# Patient Record
Sex: FEMALE | Race: WHITE | Employment: OTHER | URBAN - METROPOLITAN AREA
[De-identification: names, ages, dates, MRNs, and addresses within clinical notes are randomized per-mention and may not be internally consistent; named-entity substitution may affect disease eponyms.]

---

## 2017-01-23 ENCOUNTER — TELEPHONE (OUTPATIENT)
Dept: FAMILY MEDICINE | Facility: CLINIC | Age: 82
End: 2017-01-23

## 2017-01-23 NOTE — TELEPHONE ENCOUNTER
The travel clinic will make recommendations and administer the appropriate immunizations/meds.    Tameka Romo PA-C

## 2017-01-23 NOTE — TELEPHONE ENCOUNTER
Called pt and advised of provider notes below. [facility] travel clinic phone # given.    Nelson Palacio RN

## 2017-01-23 NOTE — TELEPHONE ENCOUNTER
Patient will be going to [place] on March 8th and will be staying there for 5 days.  Patient checked the Internet and was informed that she should receive vaccination for Hepatitis A and Typhoid.  Patient spoke to travel clinic, but didn't schedule an appointment due to not covered by insurance.    Routing to provider. Please advise.  Sharon Doty RN

## 2017-01-23 NOTE — TELEPHONE ENCOUNTER
Pt is leaving the country to   [place]  They recommend her to get some shots  She has questions for you on what to get and where to get them    She looked online and is confused by it all  Call to advise and ok to leave message    Diann John (Self) [phone] (H)

## 2017-01-25 ENCOUNTER — TELEPHONE (OUTPATIENT)
Dept: FAMILY MEDICINE | Facility: CLINIC | Age: 82
End: 2017-01-25

## 2017-01-25 NOTE — TELEPHONE ENCOUNTER
Reason for Call:  Other call back    Detailed comments: Pt would like to talk further about a Hepataitis A shot for she has  questions she would like to address.    Phone Number Patient can be reached at: Home number on file [phone] (home)    Best Time: Anytime    Can we leave a detailed message on this number? YES    Call taken on 1/25/2017 at 1:59 PM by Barrera Contreras

## 2017-02-16 ENCOUNTER — OFFICE VISIT (OUTPATIENT)
Dept: URGENT CARE | Facility: URGENT CARE | Age: 82
End: 2017-02-16
Payer: COMMERCIAL

## 2017-02-16 ENCOUNTER — TELEPHONE (OUTPATIENT)
Dept: FAMILY MEDICINE | Facility: CLINIC | Age: 82
End: 2017-02-16

## 2017-02-16 VITALS
TEMPERATURE: 98.4 F | SYSTOLIC BLOOD PRESSURE: 144 MMHG | WEIGHT: 115 LBS | DIASTOLIC BLOOD PRESSURE: 72 MMHG | OXYGEN SATURATION: 96 % | BODY MASS INDEX: 21.38 KG/M2 | HEART RATE: 58 BPM

## 2017-02-16 DIAGNOSIS — N30.90 BLADDER INFECTION: ICD-10-CM

## 2017-02-16 DIAGNOSIS — R30.0 DYSURIA: Primary | ICD-10-CM

## 2017-02-16 DIAGNOSIS — R82.90 NONSPECIFIC FINDING ON EXAMINATION OF URINE: ICD-10-CM

## 2017-02-16 LAB
ALBUMIN UR-MCNC: ABNORMAL MG/DL
APPEARANCE UR: ABNORMAL
BACTERIA #/AREA URNS HPF: ABNORMAL /HPF
BILIRUB UR QL STRIP: NEGATIVE
COLOR UR AUTO: YELLOW
GLUCOSE UR STRIP-MCNC: NEGATIVE MG/DL
HGB UR QL STRIP: ABNORMAL
KETONES UR STRIP-MCNC: NEGATIVE MG/DL
LEUKOCYTE ESTERASE UR QL STRIP: ABNORMAL
NITRATE UR QL: NEGATIVE
PH UR STRIP: 5.5 PH (ref 5–7)
RBC #/AREA URNS AUTO: >100 /HPF (ref 0–2)
SP GR UR STRIP: 1.01 (ref 1–1.03)
URN SPEC COLLECT METH UR: ABNORMAL
UROBILINOGEN UR STRIP-ACNC: 0.2 EU/DL (ref 0.2–1)
WBC #/AREA URNS AUTO: ABNORMAL /HPF (ref 0–2)

## 2017-02-16 PROCEDURE — 81001 URINALYSIS AUTO W/SCOPE: CPT | Performed by: FAMILY MEDICINE

## 2017-02-16 PROCEDURE — 99213 OFFICE O/P EST LOW 20 MIN: CPT | Performed by: FAMILY MEDICINE

## 2017-02-16 RX ORDER — HEPATITIS A VACCINE, INACTIVATED 50 [IU]/ML
50 INJECTION, SUSPENSION INTRAMUSCULAR ONCE
COMMUNITY
Start: 2017-01-25 | End: 2019-07-31

## 2017-02-16 RX ORDER — CIPROFLOXACIN 250 MG/1
250 TABLET, FILM COATED ORAL 2 TIMES DAILY
Qty: 6 TABLET | Refills: 0 | Status: SHIPPED | OUTPATIENT
Start: 2017-02-16 | End: 2017-02-19

## 2017-02-16 NOTE — PROGRESS NOTES
Some of this note was populated by a medical assistant.      SUBJECTIVE:                                                    Diann John is a 85 year old female who presents to clinic today for the following health issues:      URINARY TRACT SYMPTOMS      Duration: 3 days     Description  Frequency, hesitancy, denies dysuria     Intensity:  moderate    Accompanying signs and symptoms:  Fever/chills: YES- chills. No fever.   Flank pain no   Nausea and vomiting: no   Vaginal symptoms: none  Abdominal/Pelvic Pain: YES- pressure in bladder area    History  History of frequent UTI's: no   History of kidney stones: no   Sexually Active: no   Possibility of pregnancy: No    Precipitating or alleviating factors: Pt states that ever since she started her new BP medication that her urine output has changed.     Therapies tried and outcome: increase fluid intake   Outcome: no relief.      Rare UTI hx over her lifetime.     Problem list and histories reviewed & adjusted, as indicated.  Additional history: as documented    Patient Active Problem List   Diagnosis     Macular pucker, right eye     Pseudophakia OU     Blepharitis     Hypertension goal BP (blood pressure) < 140/90     CARDIOVASCULAR SCREENING; LDL GOAL LESS THAN 70     Seasonal allergies     Acne     Steroid responders     Carotid artery stenosis, asymptomatic     PVD (posterior vitreous detachment) OU     Advanced directives, counseling/discussion     Past Surgical History   Procedure Laterality Date     Appendectomy       C rad resec tonsil/pillars       Adeniods     Hand surgery       Nose surgery       Cataract iol, rt/lt Right        Social History   Substance Use Topics     Smoking status: Former Smoker     Quit date: 1/1/1970     Smokeless tobacco: Never Used     Alcohol use No     Family History   Problem Relation Age of Onset     CANCER Father      Glaucoma Father      CANCER Mother      Hypertension Mother      Hypertension Sister      CEREBROVASCULAR  DISEASE Sister      Thyroid Disease No family hx of      Macular Degeneration No family hx of          Current Outpatient Prescriptions   Medication Sig Dispense Refill     fluorometholone (FML LIQUIFILM) 0.1 % ophthalmic suspension Place 1 drop into both eyes 3 times daily 1 Bottle 3     ascorbic acid (VITAMIN C) 1000 MG TABS Take 1,000 mg by mouth daily       amLODIPine (NORVASC) 5 MG tablet Take 1 tablet (5 mg) by mouth daily 90 tablet 1     atorvastatin (LIPITOR) 20 MG tablet Take 1 tablet (20 mg) by mouth daily 90 tablet 1     fluticasone (FLONASE) 50 MCG/ACT nasal spray Spray 1-2 sprays into both nostrils daily 16 g 11     erythromycin (ROMYCIN) ophthalmic ointment Place 1 Application into both eyes At Bedtime Apply small (1/4 inch) strip to affected eye(s) 1 Tube 6     loteprednol (ALREX) 0.2 % SUSP Place 1 drop into both eyes 2 times daily as needed 1 Bottle 1     aspirin 81 MG tablet Take 1 tablet (81 mg) by mouth daily 30 tablet      ipratropium (ATROVENT) 0.06 % nasal spray Spray 2 sprays into both nostrils 4 times daily as needed for rhinitis 1 Box 11     omega-3 fatty acids (FISH OIL) 1200 MG capsule Take 1 capsule by mouth daily       Multiple Vitamin (MULTI-VITAMIN PO) Take 1 tablet by mouth daily.       Calcium-Vitamin D (CALCIUM + D PO) Take 1 tablet by mouth daily.       VAQTA 50 UNIT/ML injection        Allergies   Allergen Reactions     Codeine Other (See Comments)     Heaaches     Lisinopril Cough       ROS:  Constitutional, HEENT, cardiovascular, pulmonary, gi and gu systems are negative, except as otherwise noted.    OBJECTIVE:                                                    /72 (BP Location: Left arm, Patient Position: Chair, Cuff Size: Adult Regular)  Pulse 58  Temp 98.4  F (36.9  C) (Oral)  Wt 115 lb (52.2 kg)  SpO2 96%  Breastfeeding? No  BMI 21.38 kg/m2  Body mass index is 21.38 kg/(m^2).  GENERAL: healthy, alert and no distress  NECK: no adenopathy, no asymmetry, masses, or  scars and thyroid normal to palpation  RESP: lungs clear to auscultation - no rales, rhonchi or wheezes  CV: regular rate and rhythm, normal S1 S2, no S3 or S4, no murmur, click or rub, no peripheral edema and peripheral pulses strong  ABDOMEN: soft, nontender, no hepatosplenomegaly, no masses and bowel sounds normal  MS: no gross musculoskeletal defects noted, no edema    Results for orders placed or performed in visit on 02/16/17   UA with Microscopic reflex to Culture   Result Value Ref Range    Color Urine Yellow     Appearance Urine Slightly Cloudy     Glucose Urine Negative NEG mg/dL    Bilirubin Urine Negative NEG    Ketones Urine Negative NEG mg/dL    Specific Gravity Urine 1.015 1.003 - 1.035    pH Urine 5.5 5.0 - 7.0 pH    Protein Albumin Urine Trace (A) NEG mg/dL    Urobilinogen Urine 0.2 0.2 - 1.0 EU/dL    Nitrite Urine Negative NEG    Blood Urine Large (A) NEG    Leukocyte Esterase Urine Moderate (A) NEG    Source Midstream Urine     WBC Urine >100  Clumps of WBC's seen   (A) 0 - 2 /HPF    RBC Urine >100 (A) 0 - 2 /HPF    Bacteria Urine Moderate (A) NEG /HPF         ASSESSMENT/PLAN:                                                        ICD-10-CM    1. Dysuria R30.0 UA with Microscopic reflex to Culture   2. Nonspecific finding on examination of urine R82.90 Urine Culture Aerobic Bacterial   3. Bladder infection N30.90 ciprofloxacin (CIPRO) 250 MG tablet      PLAN  Patient educational/instructional material provided including reasons for follow-up   The patient indicates understanding of these issues and agrees with the plan.  Víctor Butler MD

## 2017-02-16 NOTE — TELEPHONE ENCOUNTER
Patient has had for past few days a pressure or fullness in lower abdomen. When she urinates it feels better for a little while but she is not sure if she is completely emptying. Patient denies n/v, fever, back / abd pain, burning, or enlarging abdomen. However, patient would like appointment as she feels this is progressing. I offered patient urgent care. Patient is agreeable to coming in to be seen at [facility], today.       Layne Esquivel RN

## 2017-02-16 NOTE — MR AVS SNAPSHOT
After Visit Summary   2/16/2017    Diann John    MRN: [id number]           Patient Information     Date Of Birth          7/31/1931        Visit Information        Provider Department      2/16/2017 4:40 PM Víctor Butler MD [facility]        Today's Diagnoses     Dysuria    -  1    Nonspecific finding on examination of urine        Bladder infection          Care Instructions      Understanding Urinary Tract Infections (UTIs)  Most UTIs are caused by bacteria, although they may also be caused by viruses or fungi. Bacteria from the bowel are the most common source of infection. The infection may begin because of any of the following:    Sexual activity. During sex, germs can travel from the penis, vagina, or rectum into the urethra.     Germs on the skin outside the rectum may travel into the urethra. This is more common in women since the rectum and urethra are closer to each other than in men. Wiping from front to back after using the toilet and keeping the area clean can help prevent germs from getting to the urethra.    Blockage of urine flow through the urinary tract. If urine sits too long, germs may begin to grow out of control.      Parts of the urinary tract  The infection can occur in any part of the urinary tract.    The kidneys collect and store urine.    The ureters carry urine from the kidneys to the bladder.    The bladder holds urine until you are ready to let it out.    The urethra carries urine from the bladder out of the body. It is shorter in women, so bacteria can move through it more easily. The urethra is longer in men, so a UTI is less likely to reach the bladder or kidneys in men.    7156-9361 [employer]. [address] [place] [address], [place] [address]. All rights reserved. This information is not intended as a substitute for professional medical care. Always follow your healthcare professional's instructions.              Follow-ups  "after your visit        Who to contact     If you have questions or need follow up information about today's clinic visit or your schedule please contact [facility] directly at [phone].  Normal or non-critical lab and imaging results will be communicated to you by MyChart, letter or phone within 4 business days after the clinic has received the results. If you do not hear from us within 7 days, please contact the clinic through MyChart or phone. If you have a critical or abnormal lab result, we will notify you by phone as soon as possible.  Submit refill requests through [employer] or call your pharmacy and they will forward the refill request to us. Please allow 3 business days for your refill to be completed.          Additional Information About Your Visit        [facility] Information     [employer] lets you send messages to your doctor, view your test results, renew your prescriptions, schedule appointments and more. To sign up, go to [email or web address] . Click on \"Log in\" on the left side of the screen, which will take you to the Welcome page. Then click on \"Sign up Now\" on the right side of the page.     You will be asked to enter the access code listed below, as well as some personal information. Please follow the directions to create your username and password.     Your access code is: [email or web address]  Expires: 2017  6:12 PM     Your access code will  in 90 days. If you need help or a new code, please call your [place] clinic or [phone].        Care EveryWhere ID     This is your Care EveryWhere ID. This could be used by other organizations to access your [place] medical records  [id number]        Your Vitals Were     Pulse Temperature Pulse Oximetry Breastfeeding? BMI (Body Mass Index)       58 98.4  F (36.9  C) (Oral) 96% No 21.38 kg/m2        Blood Pressure from Last 3 Encounters:   17 144/72   16 115/72   16 126/60    Weight from Last 3 Encounters: "   02/16/17 115 lb (52.2 kg)   09/26/16 113 lb (51.3 kg)   06/06/16 115 lb 4.8 oz (52.3 kg)              We Performed the Following     UA with Microscopic reflex to Culture     Urine Culture Aerobic Bacterial          Today's Medication Changes          These changes are accurate as of: 2/16/17  6:12 PM.  If you have any questions, ask your nurse or doctor.               Start taking these medicines.        Dose/Directions    ciprofloxacin 250 MG tablet   Commonly known as:  CIPRO   Used for:  Bladder infection   Started by:  Víctor Butler MD        Dose:  250 mg   Take 1 tablet (250 mg) by mouth 2 times daily for 3 days   Quantity:  6 tablet   Refills:  0            Where to get your medicines      These medications were sent to [facility] - [place], [place] - [address]  [address] [address]     Phone:  [phone]     ciprofloxacin 250 MG tablet                Primary Care Provider Office Phone # Fax #    Tameka Romo PA-C [phone]       [facility] [address]  [facility] [address]        Thank you!     Thank you for choosing [facility]  for your care. Our goal is always to provide you with excellent care. Hearing back from our patients is one way we can continue to improve our services. Please take a few minutes to complete the written survey that you may receive in the mail after your visit with us. Thank you!             Your Updated Medication List - Protect others around you: Learn how to safely use, store and throw away your medicines at www.disposemymeds.org.          This list is accurate as of: 2/16/17  6:12 PM.  Always use your most recent med list.                   Brand Name Dispense Instructions for use    amLODIPine 5 MG tablet    NORVASC    90 tablet    Take 1 tablet (5 mg) by mouth daily       ascorbic acid 1000 MG Tabs    vitamin C     Take 1,000 mg by mouth daily        aspirin 81 MG tablet     30 tablet    Take 1 tablet (81 mg) by mouth daily       atorvastatin 20 MG tablet    LIPITOR    90 tablet    Take 1 tablet (20 mg) by mouth daily       CALCIUM + D PO      Take 1 tablet by mouth daily.       ciprofloxacin 250 MG tablet    CIPRO    6 tablet    Take 1 tablet (250 mg) by mouth 2 times daily for 3 days       erythromycin ophthalmic ointment    ROMYCIN    1 Tube    Place 1 Application into both eyes At Bedtime Apply small (1/4 inch) strip to affected eye(s)       fluorometholone 0.1 % ophthalmic susp    FML LIQUIFILM    1 Bottle    Place 1 drop into both eyes 3 times daily       fluticasone 50 MCG/ACT spray    FLONASE    16 g    Spray 1-2 sprays into both nostrils daily       ipratropium 0.06 % spray    ATROVENT    1 Box    Spray 2 sprays into both nostrils 4 times daily as needed for rhinitis       loteprednol 0.2 % Susp ophthalmic susp    ALREX    1 Bottle    Place 1 drop into both eyes 2 times daily as needed       MULTI-VITAMIN PO      Take 1 tablet by mouth daily.       omega-3 fatty acids 1200 MG capsule      Take 1 capsule by mouth daily       VAQTA 50 UNIT/ML injection   Generic drug:  hepatitis A vaccine

## 2017-02-16 NOTE — TELEPHONE ENCOUNTER
Reason for call: Other   Patient called regarding (reason for call): questions    Additional comments: call to discuss issues with feelings of urgency   With urination but not being able to go- no other symptoms  Thinking it may be med related    Phone Number Pt can be reached at: Home number on file [phone] (home)  Best Time: any  Can we leave a detailed message on this number? YES

## 2017-02-16 NOTE — NURSING NOTE
"Chief Complaint   Patient presents with     Urinary Problem     Pt c/o urinary problem for the past three days.        Initial /72 (BP Location: Left arm, Patient Position: Chair, Cuff Size: Adult Regular)  Pulse 58  Temp 98.4  F (36.9  C) (Oral)  Wt 115 lb (52.2 kg)  SpO2 96%  Breastfeeding? No  BMI 21.38 kg/m2 Estimated body mass index is 21.38 kg/(m^2) as calculated from the following:    Height as of 9/26/16: 5' 1.5\" (1.562 m).    Weight as of this encounter: 115 lb (52.2 kg).  Medication Reconciliation: complete     Julianne Oconnor CMA (AAMA)      "

## 2017-02-17 NOTE — PATIENT INSTRUCTIONS
Understanding Urinary Tract Infections (UTIs)  Most UTIs are caused by bacteria, although they may also be caused by viruses or fungi. Bacteria from the bowel are the most common source of infection. The infection may begin because of any of the following:    Sexual activity. During sex, germs can travel from the penis, vagina, or rectum into the urethra.     Germs on the skin outside the rectum may travel into the urethra. This is more common in women since the rectum and urethra are closer to each other than in men. Wiping from front to back after using the toilet and keeping the area clean can help prevent germs from getting to the urethra.    Blockage of urine flow through the urinary tract. If urine sits too long, germs may begin to grow out of control.      Parts of the urinary tract  The infection can occur in any part of the urinary tract.    The kidneys collect and store urine.    The ureters carry urine from the kidneys to the bladder.    The bladder holds urine until you are ready to let it out.    The urethra carries urine from the bladder out of the body. It is shorter in women, so bacteria can move through it more easily. The urethra is longer in men, so a UTI is less likely to reach the bladder or kidneys in men.    2796-0005 [employer]. [address] [place] [address], [place] [address]. All rights reserved. This information is not intended as a substitute for professional medical care. Always follow your healthcare professional's instructions.

## 2017-02-24 LAB
BACTERIA SPEC CULT: NORMAL
MICRO REPORT STATUS: NORMAL
SPECIMEN SOURCE: NORMAL

## 2017-02-27 ENCOUNTER — TELEPHONE (OUTPATIENT)
Dept: URGENT CARE | Facility: URGENT CARE | Age: 82
End: 2017-02-27

## 2017-02-27 NOTE — TELEPHONE ENCOUNTER
Patient notified of the message below.  Patient completed medication and symptoms have resolved.  Sharon Doty RN

## 2017-02-27 NOTE — TELEPHONE ENCOUNTER
Notes Recorded by Betsy Lindsay PA-C on 2/25/2017 at 9:15 AM  Your urine culture was lost so it could not be completed.  Finish the Cipro antibiotic and follow up with your Primary Care Provider if no resolution of symptoms.    Please notify patient with above.    Betsy Lindsay PA-C    This writer attempted to contact Diann on 02/27/17.    Was call answered?  No.  Left message on voicemail with information to call me back.    If patient calls back, please Contact Clinic RN team. If no one available, send encounter message    Sharon Doty

## 2017-03-07 ENCOUNTER — TELEPHONE (OUTPATIENT)
Dept: FAMILY MEDICINE | Facility: CLINIC | Age: 82
End: 2017-03-07

## 2017-03-07 NOTE — TELEPHONE ENCOUNTER
Reason for call:  Patient reporting a symptom    Symptom or request: feet - ankle swelling, on HTN medications ever since taking things have not been right, problems with urination also     Duration (how long have symptoms been present): 2 days    Have you been treated for this before? No    Additional comments: please call asap leaving on a trip early pm, Demetrius is out just need advise    Phone Number patient can be reached at:  Home number on file [phone] (home)    Best Time:  any    Can we leave a detailed message on this number:  YES    Call taken on 3/7/2017 at 9:40 AM by Pascale Lee

## 2017-03-07 NOTE — TELEPHONE ENCOUNTER
Patient has been taking Amlodipine for the past 2 years.      Urinary problem is that sometimes she feels like she has to go and in unable.  Patient states that she is currently not having any problems urinating.    Patient has noticed the past couple of days she has had swelling in her left leg.  Swelling seems to worsen during the day and improve at night.  Patient denies any weight gain, shortness of breath, or pain.  Patient is unable to tell if her leg is red due to the fact she puts on tanning cream.    Patient is leaving for trip at 2 pm today an will not return till March 13th.    Routing to provider. Please advise.  Sharon Doty RN

## 2017-03-07 NOTE — TELEPHONE ENCOUNTER
Baby aspirin if tolerated before driving or flying, compression stocking or knee high, move legs during travel and elevate when able. Get up and walk frequently when able.  Esmer Finley MD

## 2017-03-15 ENCOUNTER — OFFICE VISIT (OUTPATIENT)
Dept: FAMILY MEDICINE | Facility: CLINIC | Age: 82
End: 2017-03-15
Payer: COMMERCIAL

## 2017-03-15 VITALS
TEMPERATURE: 96.6 F | DIASTOLIC BLOOD PRESSURE: 68 MMHG | HEART RATE: 71 BPM | WEIGHT: 116 LBS | SYSTOLIC BLOOD PRESSURE: 135 MMHG | BODY MASS INDEX: 21.9 KG/M2 | HEIGHT: 61 IN

## 2017-03-15 DIAGNOSIS — I10 HYPERTENSION GOAL BP (BLOOD PRESSURE) < 140/90: ICD-10-CM

## 2017-03-15 DIAGNOSIS — R30.0 DYSURIA: Primary | ICD-10-CM

## 2017-03-15 LAB
ALBUMIN UR-MCNC: NEGATIVE MG/DL
APPEARANCE UR: CLEAR
BILIRUB UR QL STRIP: NEGATIVE
COLOR UR AUTO: YELLOW
GLUCOSE UR STRIP-MCNC: NEGATIVE MG/DL
HGB UR QL STRIP: NEGATIVE
KETONES UR STRIP-MCNC: NEGATIVE MG/DL
LEUKOCYTE ESTERASE UR QL STRIP: NEGATIVE
NITRATE UR QL: NEGATIVE
PH UR STRIP: 5.5 PH (ref 5–7)
SP GR UR STRIP: 1.01 (ref 1–1.03)
URN SPEC COLLECT METH UR: NORMAL
UROBILINOGEN UR STRIP-ACNC: 0.2 EU/DL (ref 0.2–1)

## 2017-03-15 PROCEDURE — 99213 OFFICE O/P EST LOW 20 MIN: CPT | Performed by: NURSE PRACTITIONER

## 2017-03-15 PROCEDURE — 81003 URINALYSIS AUTO W/O SCOPE: CPT | Performed by: NURSE PRACTITIONER

## 2017-03-15 NOTE — MR AVS SNAPSHOT
After Visit Summary   3/15/2017    Diann John    MRN: [id number]           Patient Information     Date Of Birth          7/31/1931        Visit Information        Provider Department      3/15/2017 9:20 AM Betsy Donohue APRN CNP [facility]        Today's Diagnoses     Dysuria    -  1    Hypertension goal BP (blood pressure) < 140/90          Care Instructions    Based on your medical history and these are the current health maintenance or preventive care services that you are due for (some may have been done at this visit)  Health Maintenance Due   Topic Date Due     BMP Q1 YR (NO INBASKET)  03/07/2017     MICROALBUMIN Q1 YEAR( NO INBASKET)  03/07/2017         At [facility], we strive to deliver an exceptional experience to you, every time we see you.    If you receive a survey in the mail, please send us back your thoughts. We really do value your feedback.    Your care team's suggested websites for health information:  Www.[employer] : Up to date and easily searchable information on multiple topics.  Www.medlineplus.gov : medication info, interactive tutorials, watch real surgeries online  Www.familydoctor.org : good info from the Academy of Family Physicians  Www.cdc.gov : public health info, travel advisories, epidemics (H1N1)  Www.aap.org : children's health info, normal development, vaccinations  Www.health.[facility].[place].us : [place] dept of health, public health issues in [place], N1N1    How to contact your care team:   Team Bev/Spirit [phone]         Pharmacy [phone]    Dr. Rolon, Marilin Pride PA-C, Dr. Jenkins, Lily HAYWOOD CNP, Pretty Molina PA-C, Dr. Layne, and YOBANY Adnerson CNP    Team RNs: Nisreen & Monika      Clinic hours  M-Th 7 am-7 pm   Fri 7 am-5 pm.   Urgent care M-F 11 am-9 pm,   Sat/Sun 9 am-5 pm.  Pharmacy M-Th 8 am-8 pm Fri 8 am-6 pm  Sat/Sun 9 am-5 pm.     All password changes, disabled accounts,  "or ID changes in [employer]/MyHealth will be done by our Access Services Department.    If you need help with your account or password, call: [phone]. Clinic staff no longer has the ability to change passwords.           Follow-ups after your visit        Follow-up notes from your care team     Return in about 6 months (around 9/15/2017), or if symptoms worsen or fail to improve.      Who to contact     If you have questions or need follow up information about today's clinic visit or your schedule please contact [facility] STACIA PATEL directly at [phone].  Normal or non-critical lab and imaging results will be communicated to you by Buyapowahart, letter or phone within 4 business days after the clinic has received the results. If you do not hear from us within 7 days, please contact the clinic through EvalYout or phone. If you have a critical or abnormal lab result, we will notify you by phone as soon as possible.  Submit refill requests through [employer] or call your pharmacy and they will forward the refill request to us. Please allow 3 business days for your refill to be completed.          Additional Information About Your Visit        [employer] Information     [employer] lets you send messages to your doctor, view your test results, renew your prescriptions, schedule appointments and more. To sign up, go to [email or web address] . Click on \"Log in\" on the left side of the screen, which will take you to the Welcome page. Then click on \"Sign up Now\" on the right side of the page.     You will be asked to enter the access code listed below, as well as some personal information. Please follow the directions to create your username and password.     Your access code is: [email or web address]  Expires: 2017  7:12 PM     Your access code will  in 90 days. If you need help or a new code, please call your [facility] or [phone].        Care EveryWhere ID     This is your Care EveryWhere ID. This could " "be used by other organizations to access your [place] medical records  [id number]        Your Vitals Were     Pulse Temperature Height Breastfeeding? BMI (Body Mass Index)       71 96.6  F (35.9  C) (Oral) 5' 1.42\" (1.56 m) No 21.62 kg/m2        Blood Pressure from Last 3 Encounters:   03/15/17 135/68   02/16/17 144/72   09/26/16 115/72    Weight from Last 3 Encounters:   03/15/17 116 lb (52.6 kg)   02/16/17 115 lb (52.2 kg)   09/26/16 113 lb (51.3 kg)              We Performed the Following     UA reflex to Microscopic and Culture        Primary Care Provider Office Phone # Fax #    Tameka Romo PA-C [phone]       [facility] [address] [place]  [facility] [address]        Thank you!     Thank you for choosing [facility]  for your care. Our goal is always to provide you with excellent care. Hearing back from our patients is one way we can continue to improve our services. Please take a few minutes to complete the written survey that you may receive in the mail after your visit with us. Thank you!             Your Updated Medication List - Protect others around you: Learn how to safely use, store and throw away your medicines at www.disposemymeds.org.          This list is accurate as of: 3/15/17 11:59 PM.  Always use your most recent med list.                   Brand Name Dispense Instructions for use    ascorbic acid 1000 MG Tabs    vitamin C     Take 1,000 mg by mouth daily       aspirin 81 MG tablet     30 tablet    Take 1 tablet (81 mg) by mouth daily       atorvastatin 20 MG tablet    LIPITOR    90 tablet    Take 1 tablet (20 mg) by mouth daily       CALCIUM + D PO      Take 1 tablet by mouth daily.       erythromycin ophthalmic ointment    ROMYCIN    1 Tube    Place 1 Application into both eyes At Bedtime Apply small (1/4 inch) strip to affected eye(s)       loteprednol 0.2 % Susp ophthalmic susp    ALREX    1 Bottle    Place 1 drop into both eyes " 2 times daily as needed       MULTI-VITAMIN PO      Take 1 tablet by mouth daily.       omega-3 fatty acids 1200 MG capsule      Take 1 capsule by mouth daily       VAQTA 50 UNIT/ML injection   Generic drug:  hepatitis A vaccine

## 2017-03-15 NOTE — NURSING NOTE
"Chief Complaint   Patient presents with     Musculoskeletal Problem     Both ankles     Hypertension     Follow up     Urinary Problem       Initial /68 (BP Location: Right arm, Patient Position: Chair, Cuff Size: Adult Regular)  Pulse 71  Temp 96.6  F (35.9  C) (Oral)  Ht 5' 1.42\" (1.56 m)  Wt 116 lb (52.6 kg)  Breastfeeding? No  BMI 21.62 kg/m2 Estimated body mass index is 21.62 kg/(m^2) as calculated from the following:    Height as of this encounter: 5' 1.42\" (1.56 m).    Weight as of this encounter: 116 lb (52.6 kg).  Medication Reconciliation: complete   An,CMA      "

## 2017-03-15 NOTE — PROGRESS NOTES
SUBJECTIVE:                                                    Diann John is a 85 year old female who presents to clinic today for the following health issues:      Wonder if the BP is causing retention on the bladder.    Hypertension Follow-up      Outpatient blood pressures are being checked at home.  Results are 134/68.    Low Salt Diet: no added salt       Amount of exercise or physical activity: None    Problems taking medications regularly: No    Medication side effects: wonder if the BP is giving her urinary retention.  Diet: low salt    Joint Pain     Onset: a little more than week ago    Description:   Location: Both Ankle  Character: Sharp and Dull ache    Intensity: moderate    Progression of Symptoms: worse    Accompanying Signs & Symptoms:  Other symptoms: swelling   History:   Previous similar pain: no       Precipitating factors:   Trauma or overuse: no     Alleviating factors:  Improved by: nothing       Therapies Tried and outcome: na    URINARY TRACT SYMPTOMS     Onset: almost a week ago    Description:   Painful urination (Dysuria): no   Blood in urine (Hematuria): no   Delay in urine (Hesitency): YES    Intensity: moderate    Progression of Symptoms:  same and constant    Accompanying Signs & Symptoms:  Fever/chills: no   Flank pain Yes   Nausea and vomiting: no   Any vaginal symptoms: none  Abdominal/Pelvic Pain: no    History:   History of frequent UTI's: YES  History of kidney stones: no   Sexually Active: no   Possibility of pregnancy: No    Precipitating factors:   na         Therapies Tried and outcome: na          Problem list and histories reviewed & adjusted, as indicated.  Additional history: as documented    BP Readings from Last 3 Encounters:   03/15/17 135/68   02/16/17 144/72   09/26/16 115/72    Wt Readings from Last 3 Encounters:   03/15/17 116 lb (52.6 kg)   02/16/17 115 lb (52.2 kg)   09/26/16 113 lb (51.3 kg)                  Labs reviewed in EPIC    Reviewed and  "updated as needed this visit by clinical staff  Tobacco  Meds  Med Hx  Surg Hx  Fam Hx  Soc Hx      Reviewed and updated as needed this visit by Provider         ROS:  Constitutional, HEENT, cardiovascular, pulmonary, gi and gu systems are negative, except as otherwise noted.    OBJECTIVE:                                                    /68 (BP Location: Right arm, Patient Position: Chair, Cuff Size: Adult Regular)  Pulse 71  Temp 96.6  F (35.9  C) (Oral)  Ht 5' 1.42\" (1.56 m)  Wt 116 lb (52.6 kg)  Breastfeeding? No  BMI 21.62 kg/m2  Body mass index is 21.62 kg/(m^2).  GENERAL: healthy, alert and no distress  EYES: Eyes grossly normal to inspection, PERRL and conjunctivae and sclerae normal  HENT: ear canals and TM's normal, nose and mouth without ulcers or lesions  NECK: no adenopathy, no asymmetry, masses, or scars and thyroid normal to palpation  RESP: lungs clear to auscultation - no rales, rhonchi or wheezes  CV: regular rate and rhythm, normal S1 S2, no S3 or S4, no murmur, click or rub, no peripheral edema and peripheral pulses strong  ABDOMEN: soft, nontender, no hepatosplenomegaly, no masses and bowel sounds normal   (female): deferred  MS: no gross musculoskeletal defects noted, no edema  BACK: no CVA tenderness, no paralumbar tenderness  PSYCH: mentation appears normal, affect normal/bright    Diagnostic Test Results:     Component      Latest Ref Rng & Units 3/15/2017 3/16/2017   Color Urine       Yellow    Appearance Urine       Clear    Glucose Urine      NEG mg/dL Negative    Bilirubin Urine      NEG Negative    Ketones Urine      NEG mg/dL Negative    Specific Gravity Urine      1.003 - 1.035 1.015    Blood Urine      NEG Negative    pH Urine      5.0 - 7.0 pH 5.5    Protein Albumin Urine      NEG mg/dL Negative    Urobilinogen Urine      0.2 - 1.0 EU/dL 0.2    Nitrite Urine      NEG Negative    Leukocyte Esterase Urine      NEG Negative    Source       Midstream Urine    Sodium      " "133 - 144 mmol/L  141   Potassium      3.4 - 5.3 mmol/L  4.0   Chloride      94 - 109 mmol/L  105   Carbon Dioxide      20 - 32 mmol/L  26   Anion Gap      3 - 14 mmol/L  10   Glucose      70 - 99 mg/dL  81   Urea Nitrogen      7 - 30 mg/dL  15   Creatinine      0.52 - 1.04 mg/dL  0.80   GFR Estimate      >60 mL/min/1.7m2  68   GFR Estimate If Black      >60 mL/min/1.7m2  82   Calcium      8.5 - 10.1 mg/dL  9.2        ASSESSMENT/PLAN:                                                          BMI:   Estimated body mass index is 21.62 kg/(m^2) as calculated from the following:    Height as of this encounter: 5' 1.42\" (1.56 m).    Weight as of this encounter: 116 lb (52.6 kg).         1. Dysuria  UA is negative, reviewed genital hygiene, frequent fluids, return to clinic if not improved, new, or worsening symptoms.   - UA reflex to Microscopic and Culture    2. Hypertension goal BP (blood pressure) < 140/90  BP well controlled, continue present management.  - Basic metabolic panel  (Ca, Cl, CO2, Creat, Gluc, K, Na, BUN); Future  - Albumin Random Urine Quantitative; Future    See Patient Instructions    YOBANY Harrison [facility]    "

## 2017-03-15 NOTE — PATIENT INSTRUCTIONS
Based on your medical history and these are the current health maintenance or preventive care services that you are due for (some may have been done at this visit)  Health Maintenance Due   Topic Date Due     BMP Q1 YR (NO INBASKET)  03/07/2017     MICROALBUMIN Q1 YEAR( NO INBASKET)  03/07/2017         At [facility], we strive to deliver an exceptional experience to you, every time we see you.    If you receive a survey in the mail, please send us back your thoughts. We really do value your feedback.    Your care team's suggested websites for health information:  Www.[employer].org : Up to date and easily searchable information on multiple topics.  Www.medlineplus.gov : medication info, interactive tutorials, watch real surgeries online  Www.familydoctor.org : good info from the Academy of Family Physicians  Www.cdc.gov : public health info, travel advisories, epidemics (H1N1)  Www.aap.org : children's health info, normal development, vaccinations  Www.health.[facility].mn.us : [place] dept of health, public health issues in [place], N1N1    How to contact your care team:   Team Bev/Spirit [phone]         Pharmacy [phone]    Dr. Rolon, Marilin Pride PA-C, Dr. Jenkins, Lily HAYWOOD CNP, Pretty Molina PA-C, Dr. Layne, and YOBANY Anderson CNP    Team RNs: Nisreen & Monika      Clinic hours  M-Th 7 am-7 pm   Fri 7 am-5 pm.   Urgent care M-F 11 am-9 pm,   Sat/Sun 9 am-5 pm.  Pharmacy M-Th 8 am-8 pm Fri 8 am-6 pm  Sat/Sun 9 am-5 pm.     All password changes, disabled accounts, or ID changes in [employer]/MyHealth will be done by our Access Services Department.    If you need help with your account or password, call: [phone]. Clinic staff no longer has the ability to change passwords.

## 2017-03-16 DIAGNOSIS — I10 HYPERTENSION GOAL BP (BLOOD PRESSURE) < 140/90: ICD-10-CM

## 2017-03-16 LAB
ANION GAP SERPL CALCULATED.3IONS-SCNC: 10 MMOL/L (ref 3–14)
BUN SERPL-MCNC: 15 MG/DL (ref 7–30)
CALCIUM SERPL-MCNC: 9.2 MG/DL (ref 8.5–10.1)
CHLORIDE SERPL-SCNC: 105 MMOL/L (ref 94–109)
CO2 SERPL-SCNC: 26 MMOL/L (ref 20–32)
CREAT SERPL-MCNC: 0.8 MG/DL (ref 0.52–1.04)
GFR SERPL CREATININE-BSD FRML MDRD: 68 ML/MIN/1.7M2
GLUCOSE SERPL-MCNC: 81 MG/DL (ref 70–99)
POTASSIUM SERPL-SCNC: 4 MMOL/L (ref 3.4–5.3)
SODIUM SERPL-SCNC: 141 MMOL/L (ref 133–144)

## 2017-03-16 PROCEDURE — 36415 COLL VENOUS BLD VENIPUNCTURE: CPT | Performed by: NURSE PRACTITIONER

## 2017-03-16 PROCEDURE — 80048 BASIC METABOLIC PNL TOTAL CA: CPT | Performed by: NURSE PRACTITIONER

## 2017-03-16 NOTE — LETTER
[address] [address]  [phone]    March 16, 2017    Diann John  [address] [facility] [address] [facility] [address]    Dear Diann,     Your metabolic panel was normal for you.  Feel free to contact me with any questions or concerns.  Thank you for allowing me to participate in your care.     Betsy Donohue APRN, CNP/smj    Results for orders placed or performed in visit on 03/16/17   Basic metabolic panel  (Ca, Cl, CO2, Creat, Gluc, K, Na, BUN)   Result Value Ref Range    Sodium 141 133 - 144 mmol/L    Potassium 4.0 3.4 - 5.3 mmol/L    Chloride 105 94 - 109 mmol/L    Carbon Dioxide 26 20 - 32 mmol/L    Anion Gap 10 3 - 14 mmol/L    Glucose 81 70 - 99 mg/dL    Urea Nitrogen 15 7 - 30 mg/dL    Creatinine 0.80 0.52 - 1.04 mg/dL    GFR Estimate 68 >60 mL/min/1.7m2    GFR Estimate If Black 82 >60 mL/min/1.7m2    Calcium 9.2 8.5 - 10.1 mg/dL

## 2017-03-22 DIAGNOSIS — I10 ESSENTIAL HYPERTENSION WITH GOAL BLOOD PRESSURE LESS THAN 140/90: ICD-10-CM

## 2017-03-22 NOTE — TELEPHONE ENCOUNTER
amLODIPine (NORVASC) 5 MG tablet      Last Written Prescription Date: 9/26/16  Last Fill Quantity: 90, # refills: 1    Last Office Visit with [facility], [facility] or [facility] prescribing provider:  3/15/17   Future Office Visit:        BP Readings from Last 3 Encounters:   03/15/17 135/68   02/16/17 144/72   09/26/16 115/72           Haresh Faarax  [facility]

## 2017-03-24 RX ORDER — AMLODIPINE BESYLATE 5 MG/1
TABLET ORAL
Qty: 90 TABLET | Refills: 0 | Status: SHIPPED | OUTPATIENT
Start: 2017-03-24 | End: 2017-06-01

## 2017-03-24 NOTE — TELEPHONE ENCOUNTER
Routing refill request to provider for review/approval because:  Sent to last provider that patient seen in clinic.    Monika Prasad RN, [facility]

## 2017-04-04 ENCOUNTER — RADIANT APPOINTMENT (OUTPATIENT)
Dept: GENERAL RADIOLOGY | Facility: CLINIC | Age: 82
End: 2017-04-04
Attending: PHYSICIAN ASSISTANT
Payer: COMMERCIAL

## 2017-04-04 ENCOUNTER — OFFICE VISIT (OUTPATIENT)
Dept: FAMILY MEDICINE | Facility: CLINIC | Age: 82
End: 2017-04-04
Payer: COMMERCIAL

## 2017-04-04 ENCOUNTER — TELEPHONE (OUTPATIENT)
Dept: FAMILY MEDICINE | Facility: CLINIC | Age: 82
End: 2017-04-04

## 2017-04-04 VITALS
DIASTOLIC BLOOD PRESSURE: 70 MMHG | BODY MASS INDEX: 22.09 KG/M2 | TEMPERATURE: 97 F | OXYGEN SATURATION: 98 % | HEIGHT: 61 IN | WEIGHT: 117 LBS | HEART RATE: 73 BPM | SYSTOLIC BLOOD PRESSURE: 137 MMHG

## 2017-04-04 DIAGNOSIS — R07.81 RIB PAIN ON RIGHT SIDE: ICD-10-CM

## 2017-04-04 DIAGNOSIS — J30.2 SEASONAL ALLERGIC RHINITIS, UNSPECIFIED ALLERGIC RHINITIS TRIGGER: ICD-10-CM

## 2017-04-04 DIAGNOSIS — S20.211A CONTUSION OF RIB, RIGHT, INITIAL ENCOUNTER: Primary | ICD-10-CM

## 2017-04-04 PROCEDURE — 99213 OFFICE O/P EST LOW 20 MIN: CPT | Performed by: PHYSICIAN ASSISTANT

## 2017-04-04 PROCEDURE — 71101 X-RAY EXAM UNILAT RIBS/CHEST: CPT | Mod: RT

## 2017-04-04 RX ORDER — FLUTICASONE PROPIONATE 50 MCG
1-2 SPRAY, SUSPENSION (ML) NASAL DAILY
Qty: 1 BOTTLE | Refills: 11 | Status: SHIPPED | OUTPATIENT
Start: 2017-04-04

## 2017-04-04 RX ORDER — NAPROXEN 500 MG/1
500 TABLET ORAL 2 TIMES DAILY WITH MEALS
Qty: 30 TABLET | Refills: 1 | Status: SHIPPED | OUTPATIENT
Start: 2017-04-04 | End: 2017-06-01

## 2017-04-04 NOTE — NURSING NOTE
"Chief Complaint   Patient presents with     Musculoskeletal Problem       Initial /70  Pulse 73  Temp 97  F (36.1  C) (Tympanic)  Ht 5' 1.42\" (1.56 m)  Wt 117 lb (53.1 kg)  SpO2 98%  Breastfeeding? No  BMI 21.81 kg/m2 Estimated body mass index is 21.81 kg/(m^2) as calculated from the following:    Height as of this encounter: 5' 1.42\" (1.56 m).    Weight as of this encounter: 117 lb (53.1 kg).  Medication Reconciliation: complete     Lorrie Levine CMA      "

## 2017-04-04 NOTE — MR AVS SNAPSHOT
After Visit Summary   4/4/2017    Diann John    MRN: [id number]           Patient Information     Date Of Birth          7/31/1931        Visit Information        Provider Department      4/4/2017 11:00 AM Mine Pride PA-C [facility]        Today's Diagnoses     Rib pain on right side    -  1    Contusion of rib, right, initial encounter        Seasonal allergic rhinitis, unspecified allergic rhinitis trigger          Care Instructions    Naproxen 500 mg twice a day with food as needed for pain    Flonase nasal spray 2 puffs daily for nasal allergies  Rib Contusion    A rib contusion is a bruise to one or more rib bones. It may cause pain, tenderness, swelling and a purplish discoloration. There may be a sharp pain while breathing  You will be assessed for other injuries. You will likely be given medication for pain. Rib contusions heal on their own, without further treatment. However, pain may take weeks to months to go away.   Note that a small crack (fracture) in the rib may cause the same symptoms as a rib contusion. The small crack may not be seen on a chest x-ray. However, the two conditions are managed in the same way.  Home care    Rest. Avoid heavy lifting, strenuous exertion, or any activity that causes pain.    Ice the area to reduce pain and swelling. Put ice cubes in a plastic bag or use a cold pack. (Wrap the cold source in a thin towel. Do not place it directly on your skin.) Ice the injured area for 20 minutes every 1-2 hours the first day. Continue with ice packs 3-4 times a day for the next two days, then as needed for the relief of pain and swelling.    Take any prescribed pain medication. If none was prescribed, take acetaminophen, ibuprofen, or naproxen to control pain.  Follow-up care  Follow up with your healthcare provider during the next week or as directed.  When to seek medical advice  Call your healthcare provider for any of the  following:    Shortness of breath or trouble breathing    Increasing chest pain with breathing    Coughing    Dizziness, weakness, or fainting    New or worsening pain    Fever of 100.4 F (38 C) or higher, or as directed by your healthcare provider    7013-8552 [employer]. [address] [place] [address]. All rights reserved. This information is not intended as a substitute for professional medical care. Always follow your healthcare professional's instructions.        Allergic Rhinitis  Allergic rhinitis is an allergic reaction that affects the nose, and often the eyes. It s often known as nasal allergies. Nasal allergies are often due to things in the environment that are breathed in. Depending what you are sensitive to, nasal allergies may occur only during certain seasons. Or they may occur year round. Common indoor allergens include house dust mites, mold, cockroaches, and pet dander. Outdoor allergens include pollen from trees, grasses, and weeds.   Symptoms include a drippy, stuffy, and itchy nose. They also include sneezing and red and itchy eyes. You may feel tired more often. Severe allergies may also affect your breathing and trigger a condition called asthma.   Tests can be done to see what allergens are affecting you. You may be referred to an allergy specialist for testing and further evaluation.  Home care  The healthcare provider may prescribe medicines to help relieve allergy symptoms.   Ask the provider for advice on how to avoid substances that you are allergic to. Below are a few tips for each type of allergen.  Pet dander:    Do not have pets with fur and feathers.    If you cannot avoid having a pet, keep it out of your bedroom and off upholstered furniture.  Pollen:    When pollen counts are high, keep windows of your car and home closed. If possible, use an air conditioner instead.    Wear a filter mask when mowing or doing yard work.  House dust mites:    Wash  bedding every week in warm water and detergent and dry on a hot setting.    Cover the mattress, box spring, and pillows with allergy covers.     If possible, sleep in a room with no carpet, curtains, or upholstered furniture.  Cockroaches:    Store food in sealed containers.    Remove garbage from the home promptly.    Fix water leaks  Mold:    Keep humidity low by using a dehumidifier or air conditioner. Keep the dehumidifier and air conditioner clean and free of mold.    Clean moldy areas with bleach and water.  In general:    Vacuum once or twice a week. If possible, use a vacuum with a high-efficiency particulate air (HEPA) filter.    Do not smoke. Avoid cigarette smoke. Cigarette smoke is an irritant that can make symptoms worse.  Follow-up care  Follow up as advised by the health care provider or our staff. If you were referred to an allergy specialist, make this appointment promptly.  When to seek medical advice  Call your healthcare provider right away if the following occur:    Coughing or wheezing    Fever greater than 100.4 F (38 C)    Continuing symptoms, new symptoms, or worsening symptoms  Call 911 right away if you have:    Trouble breathing    Hives (raised red bumps)    Severe swelling of the face or severe itching of the eyes or mouth    7805-3088 [employer]. [address] [place], [place] [address]. All rights reserved. This information is not intended as a substitute for professional medical care. Always follow your healthcare professional's instructions.              Follow-ups after your visit        Who to contact     If you have questions or need follow up information about today's clinic visit or your schedule please contact [facility] directly at [phone].  Normal or non-critical lab and imaging results will be communicated to you by MyChart, letter or phone within 4 business days after the clinic has received the results. If you do not hear from us  "within 7 days, please contact the clinic through [employer] or phone. If you have a critical or abnormal lab result, we will notify you by phone as soon as possible.  Submit refill requests through [employer] or call your pharmacy and they will forward the refill request to us. Please allow 3 business days for your refill to be completed.          Additional Information About Your Visit        [employer] Information     [employer] lets you send messages to your doctor, view your test results, renew your prescriptions, schedule appointments and more. To sign up, go to [email or web address] . Click on \"Log in\" on the left side of the screen, which will take you to the Welcome page. Then click on \"Sign up Now\" on the right side of the page.     You will be asked to enter the access code listed below, as well as some personal information. Please follow the directions to create your username and password.     Your access code is: [email or web address]  Expires: 2017  7:12 PM     Your access code will  in 90 days. If you need help or a new code, please call your [place] clinic or [phone].        Care EveryWhere ID     This is your Care EveryWhere ID. This could be used by other organizations to access your [place] medical records  [id number]        Your Vitals Were     Pulse Temperature Height Pulse Oximetry Breastfeeding? BMI (Body Mass Index)    73 97  F (36.1  C) (Tympanic) 5' 1.42\" (1.56 m) 98% No 21.81 kg/m2       Blood Pressure from Last 3 Encounters:   17 137/70   03/15/17 135/68   17 144/72    Weight from Last 3 Encounters:   17 117 lb (53.1 kg)   03/15/17 116 lb (52.6 kg)   17 115 lb (52.2 kg)                 Today's Medication Changes          These changes are accurate as of: 17 12:00 PM.  If you have any questions, ask your nurse or doctor.               Start taking these medicines.        Dose/Directions    fluticasone 50 MCG/ACT spray   Commonly known as:  FLONASE   Used for:  " Seasonal allergic rhinitis, unspecified allergic rhinitis trigger   Started by:  Mine Prdie PA-C        Dose:  1-2 spray   Spray 1-2 sprays into both nostrils daily   Quantity:  1 Bottle   Refills:  11       naproxen 500 MG tablet   Commonly known as:  NAPROSYN   Used for:  Contusion of rib, right, initial encounter   Started by:  Mine Pride PA-C        Dose:  500 mg   Take 1 tablet (500 mg) by mouth 2 times daily (with meals)   Quantity:  30 tablet   Refills:  1            Where to get your medicines      These [place] [facility] [place] - [address]  [address] [facility] [address]     Phone:  [phone]     fluticasone 50 MCG/ACT spray    naproxen 500 MG tablet                Primary Care Provider Office Phone # Fax #    Tameka Rmoo PA-C [phone]       [facility] [address]  [facility] [address]        Thank you!     Thank you for choosing [facility]  for your care. Our goal is always to provide you with excellent care. Hearing back from our patients is one way we can continue to improve our services. Please take a few minutes to complete the written survey that you may receive in the mail after your visit with us. Thank you!             Your Updated Medication List - Protect others around you: Learn how to safely use, store and throw away your medicines at www.disposemymeds.org.          This list is accurate as of: 4/4/17 12:00 PM.  Always use your most recent med list.                   Brand Name Dispense Instructions for use    amLODIPine 5 MG tablet    NORVASC    90 tablet    TAKE ONE TABLET BY MOUTH ONCE DAILY       ascorbic acid 1000 MG Tabs    vitamin C     Take 1,000 mg by mouth daily       aspirin 81 MG tablet     30 tablet    Take 1 tablet (81 mg) by mouth daily       atorvastatin 20 MG tablet    LIPITOR    90 tablet    Take 1  tablet (20 mg) by mouth daily       CALCIUM + D PO      Take 1 tablet by mouth daily.       erythromycin ophthalmic ointment    ROMYCIN    1 Tube    Place 1 Application into both eyes At Bedtime Apply small (1/4 inch) strip to affected eye(s)       fluticasone 50 MCG/ACT spray    FLONASE    1 Bottle    Spray 1-2 sprays into both nostrils daily       loteprednol 0.2 % Susp ophthalmic susp    ALREX    1 Bottle    Place 1 drop into both eyes 2 times daily as needed       MULTI-VITAMIN PO      Take 1 tablet by mouth daily.       naproxen 500 MG tablet    NAPROSYN    30 tablet    Take 1 tablet (500 mg) by mouth 2 times daily (with meals)       omega-3 fatty acids 1200 MG capsule      Take 1 capsule by mouth daily       VAQTA 50 UNIT/ML injection   Generic drug:  hepatitis A vaccine      Reported on 4/4/2017

## 2017-04-04 NOTE — PATIENT INSTRUCTIONS
Naproxen 500 mg twice a day with food as needed for pain    Flonase nasal spray 2 puffs daily for nasal allergies  Rib Contusion     A rib contusion is a bruise to one or more rib bones. It may cause pain, tenderness, swelling and a purplish discoloration. There may be a sharp pain while breathing  You will be assessed for other injuries. You will likely be given medication for pain. Rib contusions heal on their own, without further treatment. However, pain may take weeks to months to go away.   Note that a small crack (fracture) in the rib may cause the same symptoms as a rib contusion. The small crack may not be seen on a chest x-ray. However, the two conditions are managed in the same way.  Home care    Rest. Avoid heavy lifting, strenuous exertion, or any activity that causes pain.    Ice the area to reduce pain and swelling. Put ice cubes in a plastic bag or use a cold pack. (Wrap the cold source in a thin towel. Do not place it directly on your skin.) Ice the injured area for 20 minutes every 1-2 hours the first day. Continue with ice packs 3-4 times a day for the next two days, then as needed for the relief of pain and swelling.    Take any prescribed pain medication. If none was prescribed, take acetaminophen, ibuprofen, or naproxen to control pain.  Follow-up care  Follow up with your healthcare provider during the next week or as directed.  When to seek medical advice  Call your healthcare provider for any of the following:    Shortness of breath or trouble breathing    Increasing chest pain with breathing    Coughing    Dizziness, weakness, or fainting    New or worsening pain    Fever of 100.4 F (38 C) or higher, or as directed by your healthcare provider    9303-3971 [employer]. [address] [place] [address], [place] [address]. All rights reserved. This information is not intended as a substitute for professional medical care. Always follow your healthcare professional's  instructions.        Allergic Rhinitis  Allergic rhinitis is an allergic reaction that affects the nose, and often the eyes. It s often known as nasal allergies. Nasal allergies are often due to things in the environment that are breathed in. Depending what you are sensitive to, nasal allergies may occur only during certain seasons. Or they may occur year round. Common indoor allergens include house dust mites, mold, cockroaches, and pet dander. Outdoor allergens include pollen from trees, grasses, and weeds.   Symptoms include a drippy, stuffy, and itchy nose. They also include sneezing and red and itchy eyes. You may feel tired more often. Severe allergies may also affect your breathing and trigger a condition called asthma.   Tests can be done to see what allergens are affecting you. You may be referred to an allergy specialist for testing and further evaluation.  Home care  The healthcare provider may prescribe medicines to help relieve allergy symptoms.   Ask the provider for advice on how to avoid substances that you are allergic to. Below are a few tips for each type of allergen.  Pet dander:    Do not have pets with fur and feathers.    If you cannot avoid having a pet, keep it out of your bedroom and off upholstered furniture.  Pollen:    When pollen counts are high, keep windows of your car and home closed. If possible, use an air conditioner instead.    Wear a filter mask when mowing or doing yard work.  House dust mites:    Wash bedding every week in warm water and detergent and dry on a hot setting.    Cover the mattress, box spring, and pillows with allergy covers.     If possible, sleep in a room with no carpet, curtains, or upholstered furniture.  Cockroaches:    Store food in sealed containers.    Remove garbage from the home promptly.    Fix water leaks  Mold:    Keep humidity low by using a dehumidifier or air conditioner. Keep the dehumidifier and air conditioner clean and free of mold.    Clean  moldy areas with bleach and water.  In general:    Vacuum once or twice a week. If possible, use a vacuum with a high-efficiency particulate air (HEPA) filter.    Do not smoke. Avoid cigarette smoke. Cigarette smoke is an irritant that can make symptoms worse.  Follow-up care  Follow up as advised by the health care provider or our staff. If you were referred to an allergy specialist, make this appointment promptly.  When to seek medical advice  Call your healthcare provider right away if the following occur:    Coughing or wheezing    Fever greater than 100.4 F (38 C)    Continuing symptoms, new symptoms, or worsening symptoms  Call 911 right away if you have:    Trouble breathing    Hives (raised red bumps)    Severe swelling of the face or severe itching of the eyes or mouth    7938-8605 [employer]. [address] [place] [address], [place] [address]. All rights reserved. This information is not intended as a substitute for professional medical care. Always follow your healthcare professional's instructions.

## 2017-04-04 NOTE — TELEPHONE ENCOUNTER
Patient is calling stating that she tripped and feel this weekend. She did not have any pain at the time. She is now having pain in her ribs. No swelling is noted. She is having trouble moving.    Patient is scheduled in clinic for assessment at 11 am today.    Monika Prasad RN, [facility] Triage

## 2017-04-04 NOTE — PROGRESS NOTES
SUBJECTIVE:                                                    Diann John is a 85 year old female who presents to clinic today for the following health issues:       Right side of Ribs Pain     Onset: 4 days ago     Description:   Location: right flank -ribs  Character: Sharp    Intensity: moderate    Progression of Symptoms: worse    Accompanying Signs & Symptoms:  Other symptoms: when breathing deep it hurts, hard to get up when lying down    History:   Previous similar pain: no       Precipitating factors:   Trauma or overuse: YES- walked into the room and tripped on  A step stool and landed on right side     Alleviating factors:  Improved by: nothing       Therapies Tried and outcome: ice, heating pad , tylenol           Problem list and histories reviewed & adjusted, as indicated.  Additional history: as documented    Patient Active Problem List   Diagnosis     Macular pucker, right eye     Pseudophakia OU     Blepharitis     Hypertension goal BP (blood pressure) < 140/90     CARDIOVASCULAR SCREENING; LDL GOAL LESS THAN 70     Seasonal allergies     Acne     Steroid responders     Carotid artery stenosis, asymptomatic     PVD (posterior vitreous detachment) OU     Advanced directives, counseling/discussion     Past Surgical History:   Procedure Laterality Date     APPENDECTOMY       C RAD RESEC TONSIL/PILLARS      Adeniods     CATARACT IOL, RT/LT Right      HAND SURGERY       NOSE SURGERY         Social History   Substance Use Topics     Smoking status: Former Smoker     Quit date: 1/1/1970     Smokeless tobacco: Never Used     Alcohol use No     Family History   Problem Relation Age of Onset     CANCER Father      Glaucoma Father      CANCER Mother      Hypertension Mother      Hypertension Sister      CEREBROVASCULAR DISEASE Sister      Thyroid Disease No family hx of      Macular Degeneration No family hx of          Current Outpatient Prescriptions   Medication Sig Dispense Refill     naproxen  "(NAPROSYN) 500 MG tablet Take 1 tablet (500 mg) by mouth 2 times daily (with meals) 30 tablet 1     fluticasone (FLONASE) 50 MCG/ACT spray Spray 1-2 sprays into both nostrils daily 1 Bottle 11     amLODIPine (NORVASC) 5 MG tablet TAKE ONE TABLET BY MOUTH ONCE DAILY 90 tablet 0     ascorbic acid (VITAMIN C) 1000 MG TABS Take 1,000 mg by mouth daily       atorvastatin (LIPITOR) 20 MG tablet Take 1 tablet (20 mg) by mouth daily 90 tablet 1     erythromycin (ROMYCIN) ophthalmic ointment Place 1 Application into both eyes At Bedtime Apply small (1/4 inch) strip to affected eye(s) 1 Tube 6     loteprednol (ALREX) 0.2 % SUSP Place 1 drop into both eyes 2 times daily as needed 1 Bottle 1     aspirin 81 MG tablet Take 1 tablet (81 mg) by mouth daily 30 tablet      omega-3 fatty acids (FISH OIL) 1200 MG capsule Take 1 capsule by mouth daily       Multiple Vitamin (MULTI-VITAMIN PO) Take 1 tablet by mouth daily.       Calcium-Vitamin D (CALCIUM + D PO) Take 1 tablet by mouth daily.       VAQTA 50 UNIT/ML injection Reported on 4/4/2017       Allergies   Allergen Reactions     Codeine Other (See Comments)     Heaaches     Lisinopril Cough       Reviewed and updated as needed this visit by clinical staff  Tobacco  Allergies  Meds  Med Hx  Surg Hx  Fam Hx  Soc Hx      Reviewed and updated as needed this visit by Provider         ROS:  Constitutional, HEENT, cardiovascular, pulmonary, gi and gu systems are negative, except as otherwise noted.    OBJECTIVE:                                                    /70  Pulse 73  Temp 97  F (36.1  C) (Tympanic)  Ht 5' 1.42\" (1.56 m)  Wt 117 lb (53.1 kg)  SpO2 98%  Breastfeeding? No  BMI 21.81 kg/m2  Body mass index is 21.81 kg/(m^2).  GENERAL: healthy, alert and no distress  NOSE: pale nasal mucosa, clear discharge bilaterally.  NECK: no adenopathy, no asymmetry, masses, or scars and thyroid normal to palpation  RESP: lungs clear to auscultation - no rales, rhonchi or " wheezes  CV: regular rate and rhythm, normal S1 S2, no S3 or S4, no murmur, click or rub, no peripheral edema and peripheral pulses strong  ABDOMEN: soft, nontender, no hepatosplenomegaly, no masses and bowel sounds normal  MS: severe tenderness to palpation on the lateral right aspect at 5th and 6th ribs levels. No bruising, no swelling, mild erythema of the area is present. No rashes.     Diagnostic Test Results:  Xray - no acute fractures of the ribs is noted. Lungs are clear, no pneumothorax, infiltrate or effusion, heart size is normal.      ASSESSMENT/PLAN:                                                        ICD-10-CM    1. Contusion of rib, right, initial encounter S20.211A XR Ribs & Chest Right G/E 3 Views     naproxen (NAPROSYN) 500 MG tablet   2. Seasonal allergic rhinitis, unspecified allergic rhinitis trigger J30.2 fluticasone (FLONASE) 50 MCG/ACT spray      1.Naproxen 500 mg twice a day with food as needed for pain    2.Flonase nasal spray 2 puffs daily       Mine Pride PA-C  [facility]

## 2017-06-01 ENCOUNTER — TELEPHONE (OUTPATIENT)
Dept: OTHER | Facility: CLINIC | Age: 82
End: 2017-06-01

## 2017-06-01 ENCOUNTER — OFFICE VISIT (OUTPATIENT)
Dept: FAMILY MEDICINE | Facility: CLINIC | Age: 82
End: 2017-06-01
Payer: COMMERCIAL

## 2017-06-01 VITALS
SYSTOLIC BLOOD PRESSURE: 126 MMHG | DIASTOLIC BLOOD PRESSURE: 72 MMHG | BODY MASS INDEX: 21.9 KG/M2 | HEART RATE: 66 BPM | WEIGHT: 116 LBS | OXYGEN SATURATION: 95 % | HEIGHT: 61 IN | TEMPERATURE: 97.5 F

## 2017-06-01 DIAGNOSIS — Z13.6 CARDIOVASCULAR SCREENING; LDL GOAL LESS THAN 70: ICD-10-CM

## 2017-06-01 DIAGNOSIS — I65.29 CAROTID ARTERY STENOSIS: Primary | ICD-10-CM

## 2017-06-01 DIAGNOSIS — Z23 NEED FOR PROPHYLACTIC VACCINATION AGAINST STREPTOCOCCUS PNEUMONIAE (PNEUMOCOCCUS): ICD-10-CM

## 2017-06-01 DIAGNOSIS — I10 ESSENTIAL HYPERTENSION WITH GOAL BLOOD PRESSURE LESS THAN 140/90: Primary | ICD-10-CM

## 2017-06-01 DIAGNOSIS — I65.22 CAROTID ARTERY STENOSIS, ASYMPTOMATIC, LEFT: ICD-10-CM

## 2017-06-01 DIAGNOSIS — I65.22 LEFT CAROTID ARTERY STENOSIS: ICD-10-CM

## 2017-06-01 DIAGNOSIS — I70.90 ASVD (ARTERIOSCLEROTIC VASCULAR DISEASE): ICD-10-CM

## 2017-06-01 DIAGNOSIS — J30.2 SEASONAL ALLERGIC RHINITIS, UNSPECIFIED ALLERGIC RHINITIS TRIGGER: ICD-10-CM

## 2017-06-01 DIAGNOSIS — J31.0 CHRONIC RHINITIS: ICD-10-CM

## 2017-06-01 PROBLEM — E78.5 HYPERLIPIDEMIA LDL GOAL <70: Status: ACTIVE | Noted: 2017-06-01

## 2017-06-01 LAB
CREAT UR-MCNC: 82 MG/DL
MICROALBUMIN UR-MCNC: 7 MG/L
MICROALBUMIN/CREAT UR: 8.56 MG/G CR (ref 0–25)

## 2017-06-01 PROCEDURE — 99214 OFFICE O/P EST MOD 30 MIN: CPT | Performed by: FAMILY MEDICINE

## 2017-06-01 PROCEDURE — 82043 UR ALBUMIN QUANTITATIVE: CPT | Performed by: FAMILY MEDICINE

## 2017-06-01 RX ORDER — AMLODIPINE BESYLATE 2.5 MG/1
5 TABLET ORAL DAILY
Qty: 90 TABLET | Refills: 3 | Status: SHIPPED | OUTPATIENT
Start: 2017-06-01 | End: 2017-08-28

## 2017-06-01 RX ORDER — ATORVASTATIN CALCIUM 20 MG/1
20 TABLET, FILM COATED ORAL DAILY
Qty: 90 TABLET | Refills: 1 | Status: SHIPPED | OUTPATIENT
Start: 2017-06-01 | End: 2017-08-28

## 2017-06-01 ASSESSMENT — PAIN SCALES - GENERAL: PAINLEVEL: NO PAIN (0)

## 2017-06-01 NOTE — PROGRESS NOTES
SUBJECTIVE:                                                    Diann John is a 85 year old female who presents to clinic today for the following health issues:    Establish Care    Hypertension Follow-up      Outpatient blood pressures are being checked at home sometimes.  Results are 136/68.    Low Salt Diet: low salt       Amount of exercise or physical activity: 6-7 days/week for an average of 30-45 minutes    Problems taking medications regularly: No    Medication side effects: none    Diet: low salt      Hyperlipidemia Follow-Up      Rate your low fat/cholesterol diet?: good    Taking statin?  Yes, no muscle aches from statin    Other lipid medications/supplements?:  none     Vascular Disease Follow-up:  Peripheral Vascular Disease (PVD)      Chest pain or pressure, left side neck or arm pain: No    Shortness of breath/increased sweats/nausea with exertion: No    Pain in calves walking 1-2 blocks: No    Worsened or new symptoms since last visit: No    Nitroglycerin use: no    Daily aspirin use: Yes       Problem list and histories reviewed & adjusted, as indicated.  Additional history: as documented    Patient Active Problem List   Diagnosis     Macular pucker, right eye     Pseudophakia OU     Blepharitis     Hypertension goal BP (blood pressure) < 140/90     Seasonal allergies     Steroid responders     Carotid artery stenosis, asymptomatic     PVD (posterior vitreous detachment) OU     Advanced directives, counseling/discussion     ASVD (arteriosclerotic vascular disease)     Hyperlipidemia LDL goal <70     Chronic rhinitis     Past Surgical History:   Procedure Laterality Date     APPENDECTOMY       C RAD RESEC TONSIL/PILLARS      Adeniods     CATARACT IOL, RT/LT Right      HAND SURGERY       NOSE SURGERY         Social History   Substance Use Topics     Smoking status: Former Smoker     Quit date: 1/1/1970     Smokeless tobacco: Never Used     Alcohol use No     Family History   Problem Relation Age  of Onset     CANCER Father      Glaucoma Father      CANCER Mother      Hypertension Mother      Hypertension Sister      CEREBROVASCULAR DISEASE Sister      Thyroid Disease No family hx of      Macular Degeneration No family hx of          Current Outpatient Prescriptions   Medication Sig Dispense Refill     amLODIPine (NORVASC) 2.5 MG tablet Take 2 tablets (5 mg) by mouth daily 90 tablet 3     atorvastatin (LIPITOR) 20 MG tablet Take 1 tablet (20 mg) by mouth daily 90 tablet 1     fluticasone (FLONASE) 50 MCG/ACT spray Spray 1-2 sprays into both nostrils daily 1 Bottle 11     VAQTA 50 UNIT/ML injection Reported on 4/4/2017       ascorbic acid (VITAMIN C) 1000 MG TABS Take 1,000 mg by mouth daily       erythromycin (ROMYCIN) ophthalmic ointment Place 1 Application into both eyes At Bedtime Apply small (1/4 inch) strip to affected eye(s) 1 Tube 6     loteprednol (ALREX) 0.2 % SUSP Place 1 drop into both eyes 2 times daily as needed 1 Bottle 1     aspirin 81 MG tablet Take 1 tablet (81 mg) by mouth daily 30 tablet      omega-3 fatty acids (FISH OIL) 1200 MG capsule Take 1 capsule by mouth daily       Multiple Vitamin (MULTI-VITAMIN PO) Take 1 tablet by mouth daily.       Calcium-Vitamin D (CALCIUM + D PO) Take 1 tablet by mouth daily.       [DISCONTINUED] amLODIPine (NORVASC) 5 MG tablet TAKE ONE TABLET BY MOUTH ONCE DAILY 90 tablet 0     [DISCONTINUED] atorvastatin (LIPITOR) 20 MG tablet Take 1 tablet (20 mg) by mouth daily 90 tablet 1     Allergies   Allergen Reactions     Codeine Other (See Comments)     Heaaches     Lisinopril Cough     Recent Labs   Lab Test  03/16/17   0813  09/26/16   1206  03/07/16   1555  03/16/15   1007  06/09/14   0834   08/15/13   0859 10/19/12   LDL   --   74   --   106  116   --   123   --    HDL   --   57   --   72  53   --   59   --    TRIG   --   60   --   77  88   --   99   --    ALT   --    --   33   --    --    --   32   --    CR  0.80   --   0.83  0.73   --    < >  0.76   --   "  GFRESTIMATED  68   --   65  77   --    < >  73   --    GFRESTBLACK  82   --   79  >90   GFR Calc     --    < >  88   --    POTASSIUM  4.0   --   3.7  3.5   --    < >  3.8   --    TSH   --    --    --    --    --    --   3.53  2.18    < > = values in this interval not displayed.      BP Readings from Last 3 Encounters:   06/01/17 126/72   04/04/17 137/70   03/15/17 135/68    Wt Readings from Last 3 Encounters:   06/01/17 116 lb (52.6 kg)   04/04/17 117 lb (53.1 kg)   03/15/17 116 lb (52.6 kg)                  Labs reviewed in EPIC    Reviewed and updated as needed this visit by clinical staff       Reviewed and updated as needed this visit by Provider         ROS:  Constitutional, HEENT, cardiovascular, pulmonary, gi and gu systems are negative, except as otherwise noted.    OBJECTIVE:                                                    /72 (BP Location: Right arm, Patient Position: Chair, Cuff Size: Adult Regular)  Pulse 66  Temp 97.5  F (36.4  C) (Oral)  Ht 5' 1.4\" (1.56 m)  Wt 116 lb (52.6 kg)  SpO2 95%  Breastfeeding? No  BMI 21.63 kg/m2  Body mass index is 21.63 kg/(m^2).  GENERAL: elderly, alert, well nourished, well hydrated, no distress  HENT: ear canals- normal; TMs- normal; Nose- normal; Mouth- no ulcers, no lesions, missing dentition  NECK: no tenderness, no adenopathy, no asymmetry, no masses, no stiffness; thyroid- normal to palpation  RESP: lungs clear to auscultation - no rales, no rhonchi, no wheezes  CV: regular rates and rhythm, normal S1 S2, no S3 or S4 and no murmur, no click or rub, normal pulses  ABDOMEN: soft, no tenderness, no  hepatosplenomegaly, no masses, normal bowel sounds  MS: extremities- no gross deformities noted, no edema  SKIN: no suspicious lesions, no rashes, age related skin changes with seborrheic keratosis and no actinic keratosis.    NEURO: strength and tone- decreased, sensory exam- grossly normal, reflexes- symmetric  BACK: no CVA tenderness, no " paralumbar tenderness  MENTAL STATUS EXAM:  Appearance/Behavior: no apparent distress, neatly groomed, dressed appropriately for weather, appears stated age and is frail-appearing  Speech: normal  Mood/Affect: normal affect  Insight: Good     Diagnostic Test Results:  Results for orders placed or performed in visit on 06/01/17 (from the past 24 hour(s))   Albumin Random Urine Quantitative   Result Value Ref Range    Creatinine Urine 82 mg/dL    Albumin Urine mg/L 7 mg/L    Albumin Urine mg/g Cr 8.56 0 - 25 mg/g Cr        ASSESSMENT/PLAN:                                                        Tobacco Cessation:   reports that she quit smoking about 47 years ago. She has never used smokeless tobacco.          ICD-10-CM    1. Essential hypertension with goal blood pressure less than 140/90- swelling and side effects with amlodipine. Will decrease dose slightly to see if this helps. May need to change blood pressure medication  I10 Albumin Random Urine Quantitative     amLODIPine (NORVASC) 2.5 MG tablet   2. Carotid artery stenosis, asymptomatic, left I65.22 atorvastatin (LIPITOR) 20 MG tablet     VASCULAR SURGERY REFERRAL   3. CARDIOVASCULAR SCREENING; LDL GOAL LESS THAN 70 Z13.6 At goal   4. Left carotid artery stenosis I65.22 Stable and no symptoms    5. Seasonal allergic rhinitis, unspecified allergic rhinitis trigger J30.2 Mostly symptoms with eating, dairy or yogurt is worse   6. Need for prophylactic vaccination against Streptococcus pneumoniae (pneumococcus) Z23      ADMIN VACCINE, ADDL [30098]     Pneumococcal vaccine 13 valent PCV13 IM (Prevnar) [30411]   7. ASVD (arteriosclerotic vascular disease) I70.90 Taking aspirin once a day.   8. Chronic rhinitis J31.0 Vasomotor more than reactive but also has symptoms in windy days       CONSULTATION/REFERRAL to vascular for monitoring of left carotid stenosis  FUTURE LABS:       - Schedule fasting labs in a year, then see provider  FUTURE APPOINTMENTS:       -  Follow-up visit in 3 months or sooner if any questions or concerns. Recheck blood pressure   Regular exercise  See Patient Instructions    Esmer Finley MD  [facility]

## 2017-06-01 NOTE — PATIENT INSTRUCTIONS
How to contact your care team: [phone] Pharmacy [phone]   PANDA RASMUSSEN MD KATYA GEORGIEV, PA-C CHRIS JONES, PA-C NAM HO, MD JONATHAN BATES, MD ARVIN VOCAL, MD    Clinic hours M-Th 7am-7pm Fri 7am-5pm.   Urgent care M-F 11am-9pm  Sat/Sun 9am-5pm.   Pharmacy   Mon-Th:  8:00am-8pm   Fri:  8:00am-6:00pm  Sat/Sun  8:00am-5:00 pm       Established High Blood Pressure    High blood pressure (hypertension) is a chronic disease. Often health care providers don t know what causes it. But it can be caused by certain health conditions and medicines.  If you have high blood pressure, you may not have any symptoms. If you do have symptoms, they may include headache, dizziness, changes in your vision, chest pain, and shortness of breath. But even without symptoms, high blood pressure that s not treated raises your risk for heart attack and stroke. High blood pressure is a serious health risk and shouldn t be ignored.  A blood pressure reading is made up of two numbers: a higher number over a lower number. The top number is the systolic pressure. The bottom number is the diastolic pressure. A normal blood pressure is less than 120 over less than 80.  High blood pressure is when either the top number is 140 or higher, or the bottom number is 90 or higher. This must be the result when taking your blood pressure a number of times. The blood pressures between normal and high are called prehypertension.  Home care  If you have high blood pressure, you should do what is listed below to lower your blood pressure. If you are taking medicines for high blood pressure, these methods may reduce or end your need for medicines in the future.    Begin a weight-loss program if you are overweight.    Cut back on how much salt you get in your diet. Here s how to do this:    Don t eat foods that have a lot of salt. These include olives, pickles, smoked meats, and salted potato chips.    Don t add salt to  your food at the table.    Use only small amounts of salt when cooking.    Begin an exercise program. Talk with your health care provider about the type of exercise program that would be best for you. It doesn't have to be hard. Even brisk walking for 20 minutes 3 times a week is a good form of exercise.    Don t take medicines that have heart stimulants. This includes many cold and sinus decongestant pills and sprays, as well as diet pills. Check the warnings about hypertension on the label. Stimulants such as amphetamine or cocaine could be lethal for someone with high blood pressure. Never take these.    Limit how much caffeine you get in your diet. Switch to caffeine-free products.    Stop smoking. If you are a long-time smoker, this can be hard. Enroll in a stop-smoking program to make it more likely that you will quit for good.    Learn how to handle stress. This is an important part of any program to lower blood pressure. Learn about relaxation methods like meditation, yoga, or biofeedback.    If your provider prescribed medicines, take them exactly as directed. Missing doses may cause your blood pressure get out of control.    Consider buying an automatic blood pressure machine. You can get one of these at most pharmacies. Use this to watch your blood pressure at home. Give the results to your provider.  Follow-up care  You will need to make regular visits to your health care provider. This is to check your blood pressure and to make changes to your medicines. Make a follow-up appointment as directed.  When to seek medical advice  Call your health care provider right away if any of these occur:    Chest pain or shortness of breath    Severe headache    Throbbing or rushing sound in the ears    Nosebleed    Sudden severe pain in your belly (abdomen)    Extreme drowsiness, confusion, or fainting    Dizziness or dizziness with a spinning sensation (vertigo)    Weakness of an arm or leg or one side of the  face    You have problems speaking or seeing     [id number] [employer]. [address] [place] [address], [place] [address]. All rights reserved. This information is not intended as a substitute for professional medical care. Always follow your healthcare professional's instructions.

## 2017-06-01 NOTE — NURSING NOTE
"Chief Complaint   Patient presents with     Hypertension     Check     Establish Care       Initial /72 (BP Location: Right arm, Patient Position: Chair, Cuff Size: Adult Regular)  Pulse 66  Temp 97.5  F (36.4  C) (Oral)  Ht 5' 1.4\" (1.56 m)  Wt 116 lb (52.6 kg)  SpO2 95%  Breastfeeding? No  BMI 21.63 kg/m2 Estimated body mass index is 21.63 kg/(m^2) as calculated from the following:    Height as of this encounter: 5' 1.4\" (1.56 m).    Weight as of this encounter: 116 lb (52.6 kg).  Medication Reconciliation: complete     Yash Villalobos CMA    "

## 2017-06-01 NOTE — TELEPHONE ENCOUNTER
Pt referred to [facility] by  for left carotid artery stenosis.    Last carotid US was in September 2016.    Pt needs to be scheduled for carotid artery US and consult with Vascular Surgery.  Will route to scheduling to coordinate an appointment next available.    Monica Miner RN BSN

## 2017-06-01 NOTE — MR AVS SNAPSHOT
After Visit Summary   6/1/2017    Diann John    MRN: [id number]           Patient Information     Date Of Birth          7/31/1931        Visit Information        Provider Department      6/1/2017 9:20 AM Esmer Finley MD [facility]        Today's Diagnoses     Essential hypertension with goal blood pressure less than 140/90    -  1    Carotid artery stenosis, asymptomatic, left        CARDIOVASCULAR SCREENING; LDL GOAL LESS THAN 70        Left carotid artery stenosis        Seasonal allergic rhinitis, unspecified allergic rhinitis trigger        Need for prophylactic vaccination against Streptococcus pneumoniae (pneumococcus)          Care Instructions    How to contact your care team: [phone] Pharmacy [phone]   PANDA RASMUSSEN MD KATYA GEORGIEV, PA-C CHRIS JONES, PA-C NAM HO, MD JONATHAN BATES, MD ARVIN VOCAL, MD    Clinic hours M-Th 7am-7pm Fri 7am-5pm.   Urgent care M-F 11am-9pm  Sat/Sun 9am-5pm.   Pharmacy   Mon-Th:  8:00am-8pm   Fri:  8:00am-6:00pm  Sat/Sun  8:00am-5:00 pm       Established High Blood Pressure    High blood pressure (hypertension) is a chronic disease. Often health care providers don t know what causes it. But it can be caused by certain health conditions and medicines.  If you have high blood pressure, you may not have any symptoms. If you do have symptoms, they may include headache, dizziness, changes in your vision, chest pain, and shortness of breath. But even without symptoms, high blood pressure that s not treated raises your risk for heart attack and stroke. High blood pressure is a serious health risk and shouldn t be ignored.  A blood pressure reading is made up of two numbers: a higher number over a lower number. The top number is the systolic pressure. The bottom number is the diastolic pressure. A normal blood pressure is less than 120 over less than 80.  High blood pressure is when either the top  number is 140 or higher, or the bottom number is 90 or higher. This must be the result when taking your blood pressure a number of times. The blood pressures between normal and high are called prehypertension.  Home care  If you have high blood pressure, you should do what is listed below to lower your blood pressure. If you are taking medicines for high blood pressure, these methods may reduce or end your need for medicines in the future.    Begin a weight-loss program if you are overweight.    Cut back on how much salt you get in your diet. Here s how to do this:    Don t eat foods that have a lot of salt. These include olives, pickles, smoked meats, and salted potato chips.    Don t add salt to your food at the table.    Use only small amounts of salt when cooking.    Begin an exercise program. Talk with your health care provider about the type of exercise program that would be best for you. It doesn't have to be hard. Even brisk walking for 20 minutes 3 times a week is a good form of exercise.    Don t take medicines that have heart stimulants. This includes many cold and sinus decongestant pills and sprays, as well as diet pills. Check the warnings about hypertension on the label. Stimulants such as amphetamine or cocaine could be lethal for someone with high blood pressure. Never take these.    Limit how much caffeine you get in your diet. Switch to caffeine-free products.    Stop smoking. If you are a long-time smoker, this can be hard. Enroll in a stop-smoking program to make it more likely that you will quit for good.    Learn how to handle stress. This is an important part of any program to lower blood pressure. Learn about relaxation methods like meditation, yoga, or biofeedback.    If your provider prescribed medicines, take them exactly as directed. Missing doses may cause your blood pressure get out of control.    Consider buying an automatic blood pressure machine. You can get one of these at most  pharmacies. Use this to watch your blood pressure at home. Give the results to your provider.  Follow-up care  You will need to make regular visits to your health care provider. This is to check your blood pressure and to make changes to your medicines. Make a follow-up appointment as directed.  When to seek medical advice  Call your health care provider right away if any of these occur:    Chest pain or shortness of breath    Severe headache    Throbbing or rushing sound in the ears    Nosebleed    Sudden severe pain in your belly (abdomen)    Extreme drowsiness, confusion, or fainting    Dizziness or dizziness with a spinning sensation (vertigo)    Weakness of an arm or leg or one side of the face    You have problems speaking or seeing     4447-6616 [employer]. [address] [place] [address], [place] [address]. All rights reserved. This information is not intended as a substitute for professional medical care. Always follow your healthcare professional's instructions.                Follow-ups after your visit        Additional Services     VASCULAR SURGERY REFERRAL       Your provider has referred you to: **[facility] [phone] - Carotid Artery Disease - Asymptomatic & Ultrasound   [email or web address]    Please be aware that coverage of these services is subject to the terms and limitations of your health insurance plan.  Call member services at your health plan with any benefit or coverage questions.      Please bring the following with you to your appointment:    (1) Any X-Rays, CTs or MRIs which have been performed.  Contact the facility where they were done to arrange for  prior to your scheduled appointment.    (2) List of current medications   (3) This referral request   (4) Any documents/labs given to you for this referral                  Follow-up notes from your care team     Return in about 3 months (around 9/1/2017) for Physical Exam,  "BP Recheck, medication follow up.      Who to contact     If you have questions or need follow up information about today's clinic visit or your schedule please contact [facility] [place] directly at [phone].  Normal or non-critical lab and imaging results will be communicated to you by MyChart, letter or phone within 4 business days after the clinic has received the results. If you do not hear from us within 7 days, please contact the clinic through MyChart or phone. If you have a critical or abnormal lab result, we will notify you by phone as soon as possible.  Submit refill requests through [employer] or call your pharmacy and they will forward the refill request to us. Please allow 3 business days for your refill to be completed.          Additional Information About Your Visit        [facility] Information     [employer] lets you send messages to your doctor, view your test results, renew your prescriptions, schedule appointments and more. To sign up, go to [email or web address] . Click on \"Log in\" on the left side of the screen, which will take you to the Welcome page. Then click on \"Sign up Now\" on the right side of the page.     You will be asked to enter the access code listed below, as well as some personal information. Please follow the directions to create your username and password.     Your access code is: [email or web address]  Expires: 2017 10:00 AM     Your access code will  in 90 days. If you need help or a new code, please call your [place] clinic or [phone].        Care EveryWhere ID     This is your Care EveryWhere ID. This could be used by other organizations to access your [place] medical records  [id number]        Your Vitals Were     Pulse Temperature Height Pulse Oximetry Breastfeeding? BMI (Body Mass Index)    66 97.5  F (36.4  C) (Oral) 5' 1.4\" (1.56 m) 95% No 21.63 kg/m2       Blood Pressure from Last 3 Encounters:   17 126/72   17 137/70   03/15/17 " 135/68    Weight from Last 3 Encounters:   06/01/17 116 lb (52.6 kg)   04/04/17 117 lb (53.1 kg)   03/15/17 116 lb (52.6 kg)              We Performed the Following          ADMIN VACCINE, ADDL [20282]     Albumin Random Urine Quantitative     Pneumococcal vaccine 13 valent PCV13 IM (Prevnar) [00669]     VASCULAR SURGERY REFERRAL          Today's Medication Changes          These changes are accurate as of: 6/1/17 10:00 AM.  If you have any questions, ask your nurse or doctor.               These medicines have changed or have updated prescriptions.        Dose/Directions    amLODIPine 2.5 MG tablet   Commonly known as:  NORVASC   This may have changed:  See the new instructions.   Used for:  Essential hypertension with goal blood pressure less than 140/90   Changed by:  Esmer Finley MD        Dose:  5 mg   Take 2 tablets (5 mg) by mouth daily   Quantity:  90 tablet   Refills:  3            Where to get your medicines      These medications were sent to [facility] [address] [place] [address] [address] [facility] [address] [facility]  [address] [facility] [address]     Phone:  [phone]     amLODIPine 2.5 MG tablet    atorvastatin 20 MG tablet                Primary Care Provider Office Phone # Fax #    Tameka Romo PA-C [phone]       [facility] [address] [facility] [address]        Thank you!     Thank you for choosing [facility]  for your care. Our goal is always to provide you with excellent care. Hearing back from our patients is one way we can continue to improve our services. Please take a few minutes to complete the written survey that you may receive in the mail after your visit with us. Thank you!             Your Updated Medication List - Protect others around you: Learn how to safely use, store and throw away your medicines at www.disposemymeds.org.          This list is accurate as of: 6/1/17 10:00 AM.   Always use your most recent med list.                   Brand Name Dispense Instructions for use    amLODIPine 2.5 MG tablet    NORVASC    90 tablet    Take 2 tablets (5 mg) by mouth daily       ascorbic acid 1000 MG Tabs    vitamin C     Take 1,000 mg by mouth daily       aspirin 81 MG tablet     30 tablet    Take 1 tablet (81 mg) by mouth daily       atorvastatin 20 MG tablet    LIPITOR    90 tablet    Take 1 tablet (20 mg) by mouth daily       CALCIUM + D PO      Take 1 tablet by mouth daily.       erythromycin ophthalmic ointment    ROMYCIN    1 Tube    Place 1 Application into both eyes At Bedtime Apply small (1/4 inch) strip to affected eye(s)       fluticasone 50 MCG/ACT spray    FLONASE    1 Bottle    Spray 1-2 sprays into both nostrils daily       loteprednol 0.2 % Susp ophthalmic susp    ALREX    1 Bottle    Place 1 drop into both eyes 2 times daily as needed       MULTI-VITAMIN PO      Take 1 tablet by mouth daily.       omega-3 fatty acids 1200 MG capsule      Take 1 capsule by mouth daily       VAQTA 50 UNIT/ML injection   Generic drug:  hepatitis A vaccine      Reported on 4/4/2017

## 2017-06-01 NOTE — LETTER
June 2, 2017      Diann John  [address]  [place] [address]        Dear Diann,    Your test results are attached. I am happy to let you know that they are stable and your medications can stay the same.    You do not have protein in the urine. We can recheck labs in 1 year.     Please call me if you have any questions about these test results or about your care.    Sincerely,      Esmer Finley MD/annie    Results for orders placed or performed in visit on 06/01/17   Albumin Random Urine Quantitative   Result Value Ref Range    Creatinine Urine 82 mg/dL    Albumin Urine mg/L 7 mg/L    Albumin Urine mg/g Cr 8.56 0 - 25 mg/g Cr

## 2017-06-02 NOTE — TELEPHONE ENCOUNTER
Patient has been scheduled for US & consult on 8/28/17 with Dr. Leon in [place]. Patient requested appointment in August/September time.

## 2017-06-02 NOTE — PROGRESS NOTES
Dear Diann John,    Your test results are attached. I am happy to let you know that they are stable and your medications can stay the same.    You do not have protein in the urine. We can recheck labs in 1 year.     Please call me if you have any questions about these test results or about your care.    Sincerely,    Esmer Finley MD

## 2017-07-20 ENCOUNTER — TELEPHONE (OUTPATIENT)
Dept: OTHER | Facility: CLINIC | Age: 82
End: 2017-07-20

## 2017-08-14 ENCOUNTER — RADIANT APPOINTMENT (OUTPATIENT)
Dept: ULTRASOUND IMAGING | Facility: CLINIC | Age: 82
End: 2017-08-14
Attending: SURGERY
Payer: COMMERCIAL

## 2017-08-14 ENCOUNTER — OFFICE VISIT (OUTPATIENT)
Dept: SURGERY | Facility: CLINIC | Age: 82
End: 2017-08-14
Payer: COMMERCIAL

## 2017-08-14 VITALS
WEIGHT: 115 LBS | HEIGHT: 61 IN | DIASTOLIC BLOOD PRESSURE: 72 MMHG | BODY MASS INDEX: 21.71 KG/M2 | OXYGEN SATURATION: 98 % | HEART RATE: 73 BPM | SYSTOLIC BLOOD PRESSURE: 149 MMHG | RESPIRATION RATE: 12 BRPM

## 2017-08-14 DIAGNOSIS — I65.29 CAROTID ARTERY STENOSIS: ICD-10-CM

## 2017-08-14 DIAGNOSIS — I65.22 CAROTID STENOSIS, LEFT: Primary | ICD-10-CM

## 2017-08-14 PROCEDURE — 99204 OFFICE O/P NEW MOD 45 MIN: CPT | Performed by: SURGERY

## 2017-08-14 PROCEDURE — 93880 EXTRACRANIAL BILAT STUDY: CPT

## 2017-08-14 RX ORDER — ATORVASTATIN CALCIUM 10 MG/1
40 TABLET, FILM COATED ORAL DAILY
Qty: 90 TABLET | Refills: 3 | Status: SHIPPED | OUTPATIENT
Start: 2017-08-14 | End: 2017-08-28

## 2017-08-14 NOTE — LETTER
2017       CONSULTATION       Re:  Diann John,  1931       PRESENTING COMPLAINT:  Left carotid stenosis.       HISTORY OF PRESENTING ILLNESS:  Mrs. John is a right hand dominant 86-year-old female who was found to have carotid artery stenosis a few years ago and she has been under surveillance since that time.  The patient has never had a cerebrovascular accident or transient ischemic attack.  On previous imaging, she had a high grade stenosis and she was kept under surveillance for that.  Recent imaging suggests progressive severe stenosis.       PAST MEDICAL HISTORY:   1.  Hypertension.   2.  History of shingles and postherpetic neuralgia.       PAST SURGICAL HISTORY:   1.  Nose surgery.   2.  Hand surgery.   3.  Cataract surgery.   4.  Tonsillectomy.   5.  Appendectomy.       SOCIAL HISTORY:  She lives by herself but she is close to 2 of her daughters.  She does not smoke or drink.  She quit smoking 35 years ago.       FAMILY HISTORY:  Noncontributory to the current illness.       REVIEW OF SYSTEMS:  As noted in History of Presenting Illness, otherwise negative.       PHYSICAL EXAMINATION:   GENERAL:  She appears comfortable and she is in no acute distress.   VITAL SIGNS:  Reviewed.   HEENT:  Head is atraumatic and normocephalic.  Mucosa are pink.   EYES:  Extraocular motions are intact.  Sclerae are anicteric.   MENTAL:  Alert, oriented.  Judgment and insight are good.   LYMPHATIC:  No supraclavicular or cervical adenopathy is noted.   CARDIOVASCULAR:  Regular rate and rhythm, S1 plus S2+0.   RESPIRATORY:  Good air entry bilaterally.  Good respiratory effort, no accessory muscles are being used.   ABDOMEN:  Soft, nontender, no hepatosplenomegaly noted.   EXTREMITIES:  No clubbing, cyanosis or edema noted.     VASCULAR:  Sharp left carotid bruit.  No carotid bruit on the right.  3+ bilateral radial and femoral pulses.       IMAGING DATA:  I reviewed the ultrasonography that was  performed and compared it to the previous studies.  There is a comparison to ultrasound in 9/2016.  This showed progressively increasing velocities in the left.  Peak systolic velocity in the left internal carotid artery is 550 cm per second and diastolic velocity is 119 cm per second with an internal carotid to common carotid ratio of 8.7.  This is consistent with progressive stenosis and greater than 80% stenosis.       DIAGNOSIS:  Severe, progressive asymptomatic left carotid artery stenosis.       PLAN:  I explained the findings of the ultrasound to Mrs. John and explained that the degree of stenosis is worsening with time.  We will obtain a CT angiogram of the head and neck.  If indeed her stenosis is 80% or higher, then my recommendation would be to proceed with carotid endarterectomy to prevent a future disabling stroke.  This is all the more important for her because she is right hand dominant, which means she is left hemispheric dominant for her motor function.  This was explained to her and she has verbalized understanding.  I am also increasing her Lipitor to 40 mg daily, and a prescription was sent directly to her pharmacy.  We will be contacting her with regard to her CT angiogram in the next couple of days.               MD MIRZA Borjas/ashkan       Dictation #[id number]  D: 08/14/2017  T: 08/15/2017

## 2017-08-14 NOTE — MR AVS SNAPSHOT
"              After Visit Summary   8/14/2017    Diann John    MRN: [id number]           Patient Information     Date Of Birth          7/31/1931        Visit Information        Provider Department      8/14/2017 1:15 PM Manan Leon MD [facility]        Today's Diagnoses     Carotid stenosis, left    -  1       Follow-ups after your visit        Your next 10 appointments already scheduled     Sep 08, 2017  9:00 AM CDT   New Visit with Lisa Page MD   [facility] ([place] [id number] [facility] [id number] [phone]              Who to contact     If you have questions or need follow up information about today's clinic visit or your schedule please contact [facility] directly at [phone].  Normal or non-critical lab and imaging results will be communicated to you by MyChart, letter or phone within 4 business days after the clinic has received the results. If you do not hear from us within 7 days, please contact the clinic through MyChart or phone. If you have a critical or abnormal lab result, we will notify you by phone as soon as possible.  Submit refill requests through [employer] or call your pharmacy and they will forward the refill request to us. Please allow 3 business days for your refill to be completed.          Additional Information About Your Visit        MyChart Information     [employer] lets you send messages to your doctor, view your test results, renew your prescriptions, schedule appointments and more. To sign up, go to [email or web address] . Click on \"Log in\" on the left side of the screen, which will take you to the Welcome page. Then click on \"Sign up Now\" on the right side of the page.     You will be asked to enter the access code listed below, as well as some personal information. Please follow the directions to create your username and password.     Your access code is: [email or web address]  Expires: " "2017 10:00 AM     Your access code will  in 90 days. If you need help or a new code, please call your [place] clinic or [phone].        Care EveryWhere ID     This is your Care EveryWhere ID. This could be used by other organizations to access your [place] medical records  [id number]        Your Vitals Were     Pulse Respirations Height Pulse Oximetry BMI (Body Mass Index)       73 12 5' 1\" (1.549 m) 98% 21.73 kg/m2        Blood Pressure from Last 3 Encounters:   17 149/72   17 126/72   17 137/70    Weight from Last 3 Encounters:   17 115 lb (52.2 kg)   17 116 lb (52.6 kg)   17 117 lb (53.1 kg)              Today, you had the following     No orders found for display         Today's Medication Changes          These changes are accurate as of: 17  2:04 PM.  If you have any questions, ask your nurse or doctor.               These medicines have changed or have updated prescriptions.        Dose/Directions    * atorvastatin 20 MG tablet   Commonly known as:  LIPITOR   This may have changed:  Another medication with the same name was added. Make sure you understand how and when to take each.   Used for:  Carotid artery stenosis, asymptomatic, left   Changed by:  Esmer Finley MD        Dose:  20 mg   Take 1 tablet (20 mg) by mouth daily   Quantity:  90 tablet   Refills:  1       * atorvastatin 10 MG tablet   Commonly known as:  LIPITOR   This may have changed:  You were already taking a medication with the same name, and this prescription was added. Make sure you understand how and when to take each.   Used for:  Carotid stenosis, left   Changed by:  Manan Leon MD        Dose:  40 mg   Take 4 tablets (40 mg) by mouth daily   Quantity:  90 tablet   Refills:  3       * Notice:  This list has 2 medication(s) that are the same as other medications prescribed for you. Read the directions carefully, and ask your doctor or other care provider to " review them with you.         Where to get your medicines      These medications were sent to [id number] - [facility], [place] - [address] [place]  [address] [facility] [address]     Phone:  [phone]     atorvastatin 10 MG tablet                Primary Care Provider Office Phone # Fax #    Tameka Romo PA-C [phone]       [address]  [facility] [address]        Equal Access to Services     JOLLY VIRGEN : Hadii aad ku hadasho Soomaali, waaxda luqadaha, qaybta kaalmada adeegyada, waxay idiin hayaan adeeg kharash lavioleta . So [facility] [phone].    ATENCIÓN: Si habla español, tiene a moreno disposición servicios gratuitos de asistencia lingüística. [facility] [phone].    We comply with applicable federal civil rights laws and [place] laws. We do not discriminate on the basis of race, color, national origin, age, disability sex, sexual orientation or gender identity.            Thank you!     Thank you for choosing [facility]  for your care. Our goal is always to provide you with excellent care. Hearing back from our patients is one way we can continue to improve our services. Please take a few minutes to complete the written survey that you may receive in the mail after your visit with us. Thank you!             Your Updated Medication List - Protect others around you: Learn how to safely use, store and throw away your medicines at www.disposemymeds.org.          This list is accurate as of: 8/14/17  2:04 PM.  Always use your most recent med list.                   Brand Name Dispense Instructions for use Diagnosis    amLODIPine 2.5 MG tablet    NORVASC    90 tablet    Take 2 tablets (5 mg) by mouth daily    Essential hypertension with goal blood pressure less than 140/90       ascorbic acid 1000 MG Tabs    vitamin C     Take 1,000 mg by mouth daily        aspirin 81 MG tablet     30 tablet    Take 1 tablet (81 mg) by mouth  daily        * atorvastatin 20 MG tablet    LIPITOR    90 tablet    Take 1 tablet (20 mg) by mouth daily    Carotid artery stenosis, asymptomatic, left       * atorvastatin 10 MG tablet    LIPITOR    90 tablet    Take 4 tablets (40 mg) by mouth daily    Carotid stenosis, left       CALCIUM + D PO      Take 1 tablet by mouth daily.        erythromycin ophthalmic ointment    ROMYCIN    1 Tube    Place 1 Application into both eyes At Bedtime Apply small (1/4 inch) strip to affected eye(s)    Blepharitis, unspecified laterality       fluticasone 50 MCG/ACT spray    FLONASE    1 Bottle    Spray 1-2 sprays into both nostrils daily    Seasonal allergic rhinitis, unspecified allergic rhinitis trigger       loteprednol 0.2 % Susp ophthalmic susp    ALREX    1 Bottle    Place 1 drop into both eyes 2 times daily as needed    Blepharitis, unspecified laterality       MULTI-VITAMIN PO      Take 1 tablet by mouth daily.        omega-3 fatty acids 1200 MG capsule      Take 1 capsule by mouth daily        VAQTA 50 UNIT/ML injection   Generic drug:  hepatitis A vaccine      Reported on 4/4/2017        * Notice:  This list has 2 medication(s) that are the same as other medications prescribed for you. Read the directions carefully, and ask your doctor or other care provider to review them with you.

## 2017-08-14 NOTE — LETTER
[facility] [address]  [address]  [place] [address]  Phone: [phone]  Fax: [phone]        2017       CONSULTATION       Re:  Diann John,  1931       PRESENTING COMPLAINT:  Left carotid stenosis.       HISTORY OF PRESENTING ILLNESS:  Mrs. John is a right hand dominant 86-year-old female who was found to have carotid artery stenosis a few years ago and she has been under surveillance since that time.  The patient has never had a cerebrovascular accident or transient ischemic attack.  On previous imaging, she had a high grade stenosis and she was kept under surveillance for that.  Recent imaging suggests progressive severe stenosis.       PAST MEDICAL HISTORY:   1.  Hypertension.   2.  History of shingles and postherpetic neuralgia.       PAST SURGICAL HISTORY:   1.  Nose surgery.   2.  Hand surgery.   3.  Cataract surgery.   4.  Tonsillectomy.   5.  Appendectomy.       SOCIAL HISTORY:  She lives by herself but she is close to 2 of her daughters.  She does not smoke or drink.  She quit smoking 35 years ago.       FAMILY HISTORY:  Noncontributory to the current illness.       REVIEW OF SYSTEMS:  As noted in History of Presenting Illness, otherwise negative.       PHYSICAL EXAMINATION:   GENERAL:  She appears comfortable and she is in no acute distress.   VITAL SIGNS:  Reviewed.   HEENT:  Head is atraumatic and normocephalic.  Mucosa are pink.   EYES:  Extraocular motions are intact.  Sclerae are anicteric.   MENTAL:  Alert, oriented.  Judgment and insight are good.   LYMPHATIC:  No supraclavicular or cervical adenopathy is noted.   CARDIOVASCULAR:  Regular rate and rhythm, S1 plus S2+0.   RESPIRATORY:  Good air entry bilaterally.  Good respiratory effort, no accessory muscles are being used.   ABDOMEN:  Soft, nontender, no hepatosplenomegaly noted.   EXTREMITIES:  No clubbing, cyanosis or edema noted.     VASCULAR:  Sharp left carotid bruit.  No  carotid bruit on the right.  3+ bilateral radial and femoral pulses.       IMAGING DATA:  I reviewed the ultrasonography that was performed and compared it to the previous studies.  There is a comparison to ultrasound in 9/2016.  This showed progressively increasing velocities in the left.  Peak systolic velocity in the left internal carotid artery is 550 cm per second and diastolic velocity is 119 cm per second with an internal carotid to common carotid ratio of 8.7.  This is consistent with progressive stenosis and greater than 80% stenosis.       DIAGNOSIS:  Severe, progressive asymptomatic left carotid artery stenosis.       PLAN:  I explained the findings of the ultrasound to Mrs. John and explained that the degree of stenosis is worsening with time.  We will obtain a CT angiogram of the head and neck.  If indeed her stenosis is 80% or higher, then my recommendation would be to proceed with carotid endarterectomy to prevent a future disabling stroke.  This is all the more important for her because she is right hand dominant, which means she is left hemispheric dominant for her motor function.  This was explained to her and she has verbalized understanding.  I am also increasing her Lipitor to 40 mg daily, and a prescription was sent directly to her pharmacy.  We will be contacting her with regard to her CT angiogram in the next couple of days.               MD MIRZA Borjas/ashkan       Dictation #[id number]  D: 08/14/2017  T: 08/15/2017

## 2017-08-14 NOTE — NURSING NOTE
"Chief Complaint   Patient presents with     Consult     consult for carotid artery       Initial /72  Pulse 73  Resp 12  Ht 1.549 m (5' 1\")  Wt 52.2 kg (115 lb)  SpO2 98%  BMI 21.73 kg/m2 Estimated body mass index is 21.73 kg/(m^2) as calculated from the following:    Height as of this encounter: 1.549 m (5' 1\").    Weight as of this encounter: 52.2 kg (115 lb).  Medication Reconciliation: complete     Shola Sam CMA      "

## 2017-08-15 NOTE — PROGRESS NOTES
2017      CONSULTATION      Re:  Diann John,  1931      PRESENTING COMPLAINT:  Left carotid stenosis.      HISTORY OF PRESENTING ILLNESS:  Mrs. John is a right hand dominant 86-year-old female who was found to have carotid artery stenosis a few years ago and she has been under surveillance since that time.  The patient has never had a cerebrovascular accident or transient ischemic attack.  On previous imaging, she had a high grade stenosis and she was kept under surveillance for that.  Recent imaging suggests progressive severe stenosis.      PAST MEDICAL HISTORY:   1.  Hypertension.   2.  History of shingles and postherpetic neuralgia.      PAST SURGICAL HISTORY:   1.  Nose surgery.   2.  Hand surgery.   3.  Cataract surgery.   4.  Tonsillectomy.   5.  Appendectomy.      SOCIAL HISTORY:  She lives by herself but she is close to 2 of her daughters.  She does not smoke or drink.  She quit smoking 35 years ago.      FAMILY HISTORY:  Noncontributory to the current illness.      REVIEW OF SYSTEMS:  As noted in History of Presenting Illness, otherwise negative.      PHYSICAL EXAMINATION:   GENERAL:  She appears comfortable and she is in no acute distress.   VITAL SIGNS:  Reviewed.   HEENT:  Head is atraumatic and normocephalic.  Mucosa are pink.   EYES:  Extraocular motions are intact.  Sclerae are anicteric.   MENTAL:  Alert, oriented.  Judgment and insight are good.   LYMPHATIC:  No supraclavicular or cervical adenopathy is noted.   CARDIOVASCULAR:  Regular rate and rhythm, S1 plus S2+0.   RESPIRATORY:  Good air entry bilaterally.  Good respiratory effort, no accessory muscles are being used.   ABDOMEN:  Soft, nontender, no hepatosplenomegaly noted.   EXTREMITIES:  No clubbing, cyanosis or edema noted.     VASCULAR:  Sharp left carotid bruit.  No carotid bruit on the right.  3+ bilateral radial and femoral pulses.      IMAGING DATA:  I reviewed the ultrasonography that was performed and  compared it to the previous studies.  There is a comparison to ultrasound in 2016.  This showed progressively increasing velocities in the left.  Peak systolic velocity in the left internal carotid artery is 550 cm per second and diastolic velocity is 119 cm per second with an internal carotid to common carotid ratio of 8.7.  This is consistent with progressive stenosis and greater than 80% stenosis.      DIAGNOSIS:  Severe, progressive asymptomatic left carotid artery stenosis.      PLAN:  I explained the findings of the ultrasound to Mrs. Shore and explained that the degree of stenosis is worsening with time.  We will obtain a CT angiogram of the head and neck.  If indeed her stenosis is 80% or higher, then my recommendation would be to proceed with carotid endarterectomy to prevent a future disabling stroke.  This is all the more important for her because she is right hand dominant, which means she is left hemispheric dominant for her motor function.  This was explained to her and she has verbalized understanding.  I am also increasing her Lipitor to 40 mg daily, and a prescription was sent directly to her pharmacy.  We will be contacting her with regard to her CT angiogram in the next couple of days.            MD MIRZA Borjas/ashkan      Dictation #[id number]  D: 2017  T: 08/15/2017         SERGEY HERNANDEZ MD             D: 2017 13:50   T: 08/15/2017 18:58   MT: ashkan      Name:     ALICIA SHORE   MRN:      [id number]        Account:      [id number]   :      1931           Service Date: 2017      Document: [id number]

## 2017-08-18 ENCOUNTER — NURSE TRIAGE (OUTPATIENT)
Dept: NURSING | Facility: CLINIC | Age: 82
End: 2017-08-18

## 2017-08-18 ENCOUNTER — TELEPHONE (OUTPATIENT)
Dept: NURSING | Facility: CLINIC | Age: 82
End: 2017-08-18

## 2017-08-18 NOTE — TELEPHONE ENCOUNTER
Clinic Action Needed:  Yes, callback  [facility] Triage Call  Presenting Problem:    Diann is calling and states that she was told she was suppose to get a TC Angiogram.  Diann was told  That someone will call her.  No one called Diann.   Diann also said there   was no order placed.  Diann is requesting to speak with MD Leon.  Please phone Diann at [phone].    Routed to:  RN Pool  Please be sure to close this encounter once this patient's issue/question has been addressed.    Tamy Molina RN/[facility]

## 2017-08-18 NOTE — TELEPHONE ENCOUNTER
Diann is calling and states that she was told she was suppose to get a TC Angiogram.  Diann was told  That someone will call her.  No one called Diann.   Diann also said there   was no order placed.  Diann is requesting to speak with MD Leon.  Please phone Diann at [phone].

## 2017-08-21 DIAGNOSIS — I65.29 CAROTID STENOSIS: Primary | ICD-10-CM

## 2017-08-23 ENCOUNTER — TELEPHONE (OUTPATIENT)
Dept: OTHER | Facility: CLINIC | Age: 82
End: 2017-08-23

## 2017-08-23 ENCOUNTER — RADIANT APPOINTMENT (OUTPATIENT)
Dept: CT IMAGING | Facility: CLINIC | Age: 82
End: 2017-08-23
Attending: SURGERY
Payer: COMMERCIAL

## 2017-08-23 DIAGNOSIS — I65.29 CAROTID STENOSIS: ICD-10-CM

## 2017-08-23 LAB
CREAT BLD-MCNC: 0.9 MG/DL (ref 0.5–1.2)
GFR SERPL CREATININE-BSD FRML MDRD: 59 ML/MIN/{1.73_M2}
GFRB: 58
RADIOLOGIST FLAGS: ABNORMAL

## 2017-08-23 PROCEDURE — 70496 CT ANGIOGRAPHY HEAD: CPT | Mod: TC

## 2017-08-23 PROCEDURE — 70498 CT ANGIOGRAPHY NECK: CPT | Mod: TC

## 2017-08-23 PROCEDURE — 82565 ASSAY OF CREATININE: CPT

## 2017-08-23 PROCEDURE — 36415 COLL VENOUS BLD VENIPUNCTURE: CPT

## 2017-08-23 RX ORDER — IOPAMIDOL 755 MG/ML
70 INJECTION, SOLUTION INTRAVASCULAR ONCE
Status: COMPLETED | OUTPATIENT
Start: 2017-08-23 | End: 2017-08-23

## 2017-08-23 RX ADMIN — IOPAMIDOL 70 ML: 755 INJECTION, SOLUTION INTRAVASCULAR at 10:42

## 2017-08-23 NOTE — TELEPHONE ENCOUNTER
I called Mrs. John and explained findings of the CTA head and neck. Left ICA is about 60% stenosis. Right is minimal, no surgery is advised. We did find a suspicious right upper lobe lung nodule. We will get a CT chest to look at the remainder of the lungs and refer to thoracic surgery.

## 2017-08-23 NOTE — TELEPHONE ENCOUNTER
Radiology called to report that pt has a suspicious lung nodule.     CT ANGIOGRAM OF THE HEAD AND NECK WITH CONTRAST  8/23/2017 1:14 PM   IMPRESSION:   1. Atherosclerotic disease at the left carotid bifurcation resulting  in approximately 61% stenosis.  2. Mild calcified atherosclerotic disease at the right carotid  bifurcation without stenosis.  3. Unremarkable CTA of the head without evidence of vascular cutoff,  significant stenosis, or aneurysm.  4. Atherosclerotic disease at the origin of the left subclavian and  right subclavian artery resulting in mild stenosis.  5. Suspicious-appearing 0.8 cm nodule in the right upper lobe.  Recommend further evaluation with chest CT to evaluate the remainder  of the lungs.    Will notify Dr. Leon.    Fouzia Geiger, RN, BSN.

## 2017-08-28 ENCOUNTER — OFFICE VISIT (OUTPATIENT)
Dept: FAMILY MEDICINE | Facility: CLINIC | Age: 82
End: 2017-08-28
Payer: COMMERCIAL

## 2017-08-28 VITALS
WEIGHT: 114 LBS | TEMPERATURE: 98.8 F | OXYGEN SATURATION: 99 % | SYSTOLIC BLOOD PRESSURE: 124 MMHG | HEART RATE: 79 BPM | DIASTOLIC BLOOD PRESSURE: 68 MMHG | HEIGHT: 61 IN | BODY MASS INDEX: 21.52 KG/M2

## 2017-08-28 DIAGNOSIS — I10 HYPERTENSION GOAL BP (BLOOD PRESSURE) < 140/90: ICD-10-CM

## 2017-08-28 DIAGNOSIS — E78.5 HYPERLIPIDEMIA LDL GOAL <70: ICD-10-CM

## 2017-08-28 DIAGNOSIS — I65.22 CAROTID ARTERY STENOSIS, ASYMPTOMATIC, LEFT: Primary | ICD-10-CM

## 2017-08-28 DIAGNOSIS — R91.8 PULMONARY NODULES: ICD-10-CM

## 2017-08-28 PROCEDURE — G0009 ADMIN PNEUMOCOCCAL VACCINE: HCPCS | Performed by: FAMILY MEDICINE

## 2017-08-28 PROCEDURE — 90670 PCV13 VACCINE IM: CPT | Performed by: FAMILY MEDICINE

## 2017-08-28 PROCEDURE — 99214 OFFICE O/P EST MOD 30 MIN: CPT | Mod: 25 | Performed by: FAMILY MEDICINE

## 2017-08-28 RX ORDER — ATORVASTATIN CALCIUM 20 MG/1
20 TABLET, FILM COATED ORAL DAILY
Qty: 90 TABLET | Refills: 3 | Status: SHIPPED | OUTPATIENT
Start: 2017-08-28 | End: 2018-08-16

## 2017-08-28 RX ORDER — METOPROLOL SUCCINATE 25 MG/1
25 TABLET, EXTENDED RELEASE ORAL DAILY
Qty: 90 TABLET | Refills: 3 | Status: SHIPPED | OUTPATIENT
Start: 2017-08-28 | End: 2017-08-31

## 2017-08-28 ASSESSMENT — PAIN SCALES - GENERAL: PAINLEVEL: NO PAIN (0)

## 2017-08-28 NOTE — PROGRESS NOTES
SUBJECTIVE:   Diann John is a 86 year old female who presents to clinic today for the following health issues:    Hypertension Follow-up      Outpatient blood pressures are not being checked.    Low Salt Diet: low salt, no added salt      Concern - Carotid Stenosis, left  Onset:     Description:   Patient complains of the test being 70, now it is 80 by ultrasound. Patient saw surgeon and completed a CTA Angiogram Head and neck. Results 61, so does not need surgery, but new spot seen on lung. Patient thought she was recommended to see another specialist but has not received a phone call yet and is concerned about the difference in ultrasound results and most recent imaging study    Intensity: severe anxiety over results    Progression of Symptoms:  stable    Accompanying Signs & Symptoms:           none    Previous history of similar problem:   none    Precipitating factors:   Worsened by: None    Alleviating factors:  Improved by: None    Therapies Tried and outcome: None        Hyperlipidemia Follow-Up      Rate your low fat/cholesterol diet?: good    Taking statin?  Yes, possible muscle aches from statin    Other lipid medications/supplements?:  none    Hypertension Follow-up      Outpatient blood pressures are not being checked.    Low Salt Diet: no added salt    Vascular Disease Follow-up:  Peripheral Vascular Disease (PVD)      Chest pain or pressure, left side neck or arm pain: No    Shortness of breath/increased sweats/nausea with exertion: No    Pain in calves walking 1-2 blocks: No    Worsened or new symptoms since last visit: No    Nitroglycerin use: no    Daily aspirin use: Yes              Problem list and histories reviewed & adjusted, as indicated.  Additional history: as documented    Patient Active Problem List   Diagnosis     Macular pucker, right eye     Pseudophakia OU     Blepharitis     Hypertension goal BP (blood pressure) < 140/90     Seasonal allergies     Carotid artery stenosis,  asymptomatic     PVD (posterior vitreous detachment) OU     Advanced directives, counseling/discussion     ASVD (arteriosclerotic vascular disease)     Hyperlipidemia LDL goal <70     Chronic rhinitis     Pulmonary nodules     Past Surgical History:   Procedure Laterality Date     APPENDECTOMY       C RAD RESEC TONSIL/PILLARS      Adeniods     CATARACT IOL, RT/LT Right      HAND SURGERY       NOSE SURGERY         Social History   Substance Use Topics     Smoking status: Former Smoker     Quit date: 1/1/1970     Smokeless tobacco: Never Used     Alcohol use No     Family History   Problem Relation Age of Onset     CANCER Father      Glaucoma Father      CANCER Mother      Hypertension Mother      Hypertension Sister      CEREBROVASCULAR DISEASE Sister      Thyroid Disease No family hx of      Macular Degeneration No family hx of          Current Outpatient Prescriptions   Medication Sig Dispense Refill     atorvastatin (LIPITOR) 20 MG tablet Take 1 tablet (20 mg) by mouth daily 90 tablet 3     metoprolol (TOPROL-XL) 25 MG 24 hr tablet Take 1 tablet (25 mg) by mouth daily 90 tablet 3     fluticasone (FLONASE) 50 MCG/ACT spray Spray 1-2 sprays into both nostrils daily 1 Bottle 11     VAQTA 50 UNIT/ML injection Reported on 4/4/2017       ascorbic acid (VITAMIN C) 1000 MG TABS Take 1,000 mg by mouth daily       erythromycin (ROMYCIN) ophthalmic ointment Place 1 Application into both eyes At Bedtime Apply small (1/4 inch) strip to affected eye(s) 1 Tube 6     loteprednol (ALREX) 0.2 % SUSP Place 1 drop into both eyes 2 times daily as needed 1 Bottle 1     aspirin 81 MG tablet Take 1 tablet (81 mg) by mouth daily 30 tablet      omega-3 fatty acids (FISH OIL) 1200 MG capsule Take 1 capsule by mouth daily       Multiple Vitamin (MULTI-VITAMIN PO) Take 1 tablet by mouth daily.       Calcium-Vitamin D (CALCIUM + D PO) Take 1 tablet by mouth daily.       [DISCONTINUED] atorvastatin (LIPITOR) 10 MG tablet Take 4 tablets (40 mg)  "by mouth daily 90 tablet 3     [DISCONTINUED] atorvastatin (LIPITOR) 20 MG tablet Take 1 tablet (20 mg) by mouth daily 90 tablet 1     Allergies   Allergen Reactions     Codeine Other (See Comments)     Sierramario     Lisinopril Cough     Recent Labs   Lab Test  03/16/17   0813  09/26/16   1206  03/07/16   1555  03/16/15   1007  06/09/14   0834   08/15/13   0859 10/19/12   LDL   --   74   --   106  116   --   123   --    HDL   --   57   --   72  53   --   59   --    TRIG   --   60   --   77  88   --   99   --    ALT   --    --   33   --    --    --   32   --    CR  0.80   --   0.83  0.73   --    < >  0.76   --    GFRESTIMATED  68   --   65  77   --    < >  73   --    GFRESTBLACK  82   --   79  >90   GFR Calc     --    < >  88   --    POTASSIUM  4.0   --   3.7  3.5   --    < >  3.8   --    TSH   --    --    --    --    --    --   3.53  2.18    < > = values in this interval not displayed.      BP Readings from Last 3 Encounters:   08/28/17 124/68   08/14/17 149/72   06/01/17 126/72    Wt Readings from Last 3 Encounters:   08/28/17 114 lb (51.7 kg)   08/14/17 115 lb (52.2 kg)   06/01/17 116 lb (52.6 kg)                  Labs reviewed in EPIC          Reviewed and updated as needed this visit by clinical staffTobacco  Allergies       Reviewed and updated as needed this visit by Provider         ROS:  Constitutional, HEENT, cardiovascular, pulmonary, gi and gu systems are negative, except as otherwise noted.      OBJECTIVE:   /68 (BP Location: Left arm, Patient Position: Chair, Cuff Size: Adult Regular)  Pulse 79  Temp 98.8  F (37.1  C) (Oral)  Ht 5' 1\" (1.549 m)  Wt 114 lb (51.7 kg)  SpO2 99%  BMI 21.54 kg/m2  Body mass index is 21.54 kg/(m^2).  GENERAL: elderly, alert, well nourished, well hydrated, no distress  HENT: ear canals- normal; TMs- normal; Nose- normal; Mouth- no ulcers, no lesions, missing dentition  NECK: no tenderness, no adenopathy, no asymmetry, no masses, no stiffness; " thyroid- normal to palpation  RESP: lungs clear to auscultation - no rales, no rhonchi, no wheezes  CV: regular rates and rhythm, normal S1 S2, no S3 or S4 and no murmur, no click or rub, normal pulses  ABDOMEN: soft, no tenderness, no  hepatosplenomegaly, no masses, normal bowel sounds  MS: extremities- no gross deformities noted, no edema  SKIN: no suspicious lesions, no rashes, age related skin changes with seborrheic keratosis and no actinic keratosis.    NEURO: strength and tone- normal, sensory exam- grossly normal, reflexes- symmetric  BACK: no CVA tenderness, no paralumbar tenderness  MENTAL STATUS EXAM:  Appearance/Behavior: no apparent distress, neatly groomed, dressed appropriately for weather, appears stated age and is frail-appearing  Speech: normal  Mood/Affect: normal affect  Insight: Good     Diagnostic Test Results:  Results for orders placed or performed in visit on 08/23/17   CTA Angiogram Head Neck   Result Value Ref Range    Radiologist flags Suspicious lung nodule (Urgent)     Narrative    CT ANGIOGRAM OF THE HEAD AND NECK WITH CONTRAST  8/23/2017 1:14 PM     HISTORY: History of carotid artery stenosis.    TECHNIQUE:  Precontrast localizing scans were followed by CT  angiography with an injection of 70 mL Isovue-370 IV with scans  through the head and neck.  Images were transferred to a separate 3-D  workstation where multiplanar reformations and 3-D images were  created.  Estimates of carotid stenoses are made relative to the  distal internal carotid artery diameters except as noted. Radiation  dose for this scan was reduced using automated exposure control,  adjustment of the mA and/or kV according to patient size, or iterative  reconstruction technique.    COMPARISON: Carotid ultrasound 8/14/2017.    CT HEAD FINDINGS:  No contrast enhancing lesions.   Cerebral blood  flow is grossly normal.    CT ANGIOGRAM HEAD FINDINGS:  The major intracranial arteries including  the proximal branches of  the anterior cerebral, middle cerebral, and  posterior cerebral arteries appear patent without vascular cutoff. No  aneurysm identified. Minimal calcified plaque of the cavernous and  supraclinoid internal carotid arteries without stenosis. The P1  segment of the right posterior cerebral artery is not visualized and  is likely hypoplastic or congenitally absent. The right posterior  cerebral artery is supplied by the patent right posterior  communicating artery. The left A1 segment is small in size with a  patent anterior communicating artery. Venous circulation is  unremarkable.     CT ANGIOGRAM NECK FINDINGS:   Atherosclerotic calcification at the origin of the left subclavian  artery results in mild stenosis. Atherosclerotic calcification at the  origin of the right subclavian artery from the brachiocephalic artery  results in mild stenosis.    Right carotid artery: The right common and internal carotid arteries  are patent.  Mild calcified atherosclerotic disease at the carotid  bifurcation without stenosis.      Left carotid artery: The left common and internal carotid arteries are  patent.  Focal atherosclerotic soft and calcified plaque at the  carotid bifurcation results in approximately 61% stenosis (minimum  diameter of the proximal internal carotid artery 1.8 mm compared to  normal distal internal carotid artery of 4.6 mm).      Vertebral arteries:  Vertebral arteries are patent without evidence of  dissection.  No significant stenosis.      Other findings: Suspicious-appearing 0.5 x 0.8 cm nodule in the  visualized right upper lobe with mild surrounding groundglass opacity  (series 6 image 6). Few additional subtle groundglass opacities within  the visualized lung apices.    Degenerative changes in the spine.      Impression    IMPRESSION:   1. Atherosclerotic disease at the left carotid bifurcation resulting  in approximately 61% stenosis.  2. Mild calcified atherosclerotic disease at the right  "carotid  bifurcation without stenosis.  3. Unremarkable CTA of the head without evidence of vascular cutoff,  significant stenosis, or aneurysm.  4. Atherosclerotic disease at the origin of the left subclavian and  right subclavian artery resulting in mild stenosis.  5. Suspicious-appearing 0.8 cm nodule in the right upper lobe.  Recommend further evaluation with chest CT to evaluate the remainder  of the lungs.    [Access Center: Suspicious lung nodule]    This report will be copied to the [facility] to ensure a  provider acknowledges the finding. Access Center is available Monday  through Friday 8am-3:30 pm.     JO ANN ZAPATA MD   Creatinine POCT   Result Value Ref Range    Creatinine 0.9 0.5 - 1.2 mg/dl    GFR 59 >60    GFRB 58 >60       ASSESSMENT/PLAN:         Tobacco Cessation:   reports that she quit smoking about 47 years ago. She has never used smokeless tobacco.      BMI:   Estimated body mass index is 21.54 kg/(m^2) as calculated from the following:    Height as of this encounter: 5' 1\" (1.549 m).    Weight as of this encounter: 114 lb (51.7 kg).   Weight management plan noted, stable and monitoring          1. Carotid artery stenosis, asymptomatic, left  Evaluated by vascular surgery and CT scan shows stenosis is at 61 %. Risk of surgery outweighs risk of medical management at this time. Vascular recommended higher dose of atorvastatin but this causes leg cramps for patient and she did not fill the 40 mg dose. Will continue 20 mg and recheck in 1 month.  - atorvastatin (LIPITOR) 20 MG tablet; Take 1 tablet (20 mg) by mouth daily  Dispense: 90 tablet; Refill: 3    2. Hyperlipidemia LDL goal <70  LDL Cholesterol Calculated   Date Value Ref Range Status   09/26/2016 74 <100 mg/dL Final     Comment:     Desirable:       <100 mg/dl   ] Almost at goal on 20 mg. Recheck in 1 month  - Lipid panel reflex to direct LDL; Future    3. Hypertension goal BP (blood pressure) < 140/90  Well controlled on " medications. Wants to change from amlodipine to metoprolol.   - CBC with platelets; Future  - Renal panel; Future  - metoprolol (TOPROL-XL) 25 MG 24 hr tablet; Take 1 tablet (25 mg) by mouth daily  Dispense: 90 tablet; Refill: 3    4. Pulmonary nodules  Single incidental pulmonary nodule by CT scan of neck. Remote history of smoking. Will order a chest CT and follow up as recommended. Does not need to see pulmonary or thoracic surgery at this time. Will follow pulmonary nodule protocols for follow up.   - CT Chest w/o Contrast; Future 2017, patient's   of lung cancer, but we discussed why her case is different and her risk is much lower.     FOLLOW UP Labs in 1 month    Esmer Finley MD  [facility]

## 2017-08-28 NOTE — NURSING NOTE
"Chief Complaint   Patient presents with     Imm/Inj     Pneumonia injection     Hypertension     Blood pressure medication check and questions     Carotid Artery     Follow up       Initial /68 (BP Location: Left arm, Patient Position: Chair, Cuff Size: Adult Regular)  Pulse 79  Temp 98.8  F (37.1  C) (Oral)  Ht 5' 1\" (1.549 m)  Wt 114 lb (51.7 kg)  SpO2 99%  BMI 21.54 kg/m2 Estimated body mass index is 21.54 kg/(m^2) as calculated from the following:    Height as of this encounter: 5' 1\" (1.549 m).    Weight as of this encounter: 114 lb (51.7 kg).  Medication Reconciliation: complete     Yash Villalobos CMA    "

## 2017-08-28 NOTE — MR AVS SNAPSHOT
After Visit Summary   8/28/2017    Diann John    MRN: [id number]           Patient Information     Date Of Birth          7/31/1931        Visit Information        Provider Department      8/28/2017 1:20 PM Esmer Finley MD [facility]        Today's Diagnoses     Need for prophylactic vaccination against Streptococcus pneumoniae (pneumococcus)    -  1    Carotid artery stenosis, asymptomatic, left        Hyperlipidemia LDL goal <70        Hypertension goal BP (blood pressure) < 140/90        Pulmonary nodules          Care Instructions      Please call to schedule your MRI scan or CT scan at [facility] by calling [phone].   Pulmonary Nodule  A pulmonary nodule is small area of abnormal tissue in the lung. It is usually found on an X-ray taken for other reasons. It is a single spot (lesion) up to about an inch in size, surrounded by normal lung tissue.  Most nodules are not cancerous (benign). However, a nodule could be an early stage of lung cancer. Or it may be a sign of cancer that has spread from another part of the body. When a nodule is found on a chest X-ray, further testing is needed to determine if it is benign or cancerous (malignant). To give your healthcare provider more information about the nodule, you may have one or more of these tests:    Comparison of a new X-ray to earlier X-rays    Chest CT scan    Bronchoscopy (a procedure that allows the healthcare provider to see the air passages inside the lung)    Needle biopsy  Test results    If your nodule is benign, continued follow-up over the next 5 years is usually advised.    If tests do not determine whether your nodule is benign or malignant, surgery may be advised.    If tests show that the nodule is definitely malignant, surgery will probably be advised.  The best survival rates from lung cancer occur when the original tumor is small (less than 1 inch). Follow your  healthcare provider's advice on the timing of further testing. Prompt treatment gives the best chance of curing lung cancer.  Prevention  Smoking remains one of the biggest risk factors for lung cancer. If you smoke, it is essential that you quit to lower your risk of lung cancer. Talk to your healthcare provider about things that can help you quit, including medicines and support groups. See the following websites for more information:    www.smokefree.gov    www.quitnet.com  Home care  Most people with a pulmonary nodule have no symptoms. So no special home care is required. You may return to your usual activities and diet.  Follow-up care  Follow up with your healthcare provider, or as advised.  More information about lung cancer is available from these resources:    American Lung Association: 484.374.6438, www.lung.org    National Cancer [place]: 226.455.3755, www.cancer.gov  When to seek medical advice  Call your healthcare provider right away if any of these occur:    Fever of 100.4 F (38 C) or higher    Unintended weight change  Call 911  Contact emergency services right away if any of these occur:    Coughing up blood    Chest pain or shortness of breath  Date Last Reviewed: 9/13/2015 2000-2017 [employer]. [address] [place] [address]. All rights reserved. This information is not intended as a substitute for professional medical care. Always follow your healthcare professional's instructions.                Follow-ups after your visit        Your next 10 appointments already scheduled     Sep 08, 2017  9:00 AM CDT   New Visit with Lisa Page MD   [facility]    [address] [facility] [address]  [place] [address] [phone]              Future tests that were ordered for you today     Open Future Orders        Priority Expected Expires Ordered    CT Chest w/o Contrast Routine  8/28/2018 8/28/2017    Lipid panel reflex to direct LDL  "Routine 2017    CBC with platelets Routine 2017    Renal panel Routine 2017            Who to contact     If you have questions or need follow up information about today's clinic visit or your schedule please contact [facility] STACIA PATEL directly at [phone].  Normal or non-critical lab and imaging results will be communicated to you by Estorianhart, letter or phone within 4 business days after the clinic has received the results. If you do not hear from us within 7 days, please contact the clinic through [employer] or phone. If you have a critical or abnormal lab result, we will notify you by phone as soon as possible.  Submit refill requests through [employer] or call your pharmacy and they will forward the refill request to us. Please allow 3 business days for your refill to be completed.          Additional Information About Your Visit        [employer] Information     [employer] lets you send messages to your doctor, view your test results, renew your prescriptions, schedule appointments and more. To sign up, go to [email or web address] . Click on \"Log in\" on the left side of the screen, which will take you to the Welcome page. Then click on \"Sign up Now\" on the right side of the page.     You will be asked to enter the access code listed below, as well as some personal information. Please follow the directions to create your username and password.     Your access code is: [email or web address]  Expires: 2017 10:00 AM     Your access code will  in 90 days. If you need help or a new code, please call your [place] clinic or [phone].        Care EveryWhere ID     This is your Care EveryWhere ID. This could be used by other organizations to access your [place] medical records  [id number]        Your Vitals Were     Pulse Temperature Height Pulse Oximetry BMI (Body Mass Index)       79 98.8  F (37.1  C) (Oral) 5' 1\" (1.549 m) 99% " 21.54 kg/m2        Blood Pressure from Last 3 Encounters:   08/28/17 124/68   08/14/17 149/72   06/01/17 126/72    Weight from Last 3 Encounters:   08/28/17 114 lb (51.7 kg)   08/14/17 115 lb (52.2 kg)   06/01/17 116 lb (52.6 kg)              We Performed the Following          ADMIN VACCINE, ADDL [08161]     Pneumococcal vaccine 13 valent PCV13 IM (Prevnar) [77804]          Today's Medication Changes          These changes are accurate as of: 8/28/17  2:29 PM.  If you have any questions, ask your nurse or doctor.               Start taking these medicines.        Dose/Directions    metoprolol 25 MG 24 hr tablet   Commonly known as:  TOPROL-XL   Used for:  Hypertension goal BP (blood pressure) < 140/90   Started by:  Esmer Finley MD        Dose:  25 mg   Take 1 tablet (25 mg) by mouth daily   Quantity:  90 tablet   Refills:  3         These medicines have changed or have updated prescriptions.        Dose/Directions    atorvastatin 20 MG tablet   Commonly known as:  LIPITOR   This may have changed:  Another medication with the same name was removed. Continue taking this medication, and follow the directions you see here.   Used for:  Carotid artery stenosis, asymptomatic, left   Changed by:  Esmer Finley MD        Dose:  20 mg   Take 1 tablet (20 mg) by mouth daily   Quantity:  90 tablet   Refills:  3         Stop taking these medicines if you haven't already. Please contact your care team if you have questions.     amLODIPine 2.5 MG tablet   Commonly known as:  NORVASC   Stopped by:  Esmer Finley MD                Where to get your medicines      These medications were sent to [facility] [address] [facility] [facility], [place] - [address]  [address] [address]     Phone:  [phone]     atorvastatin 20 MG tablet    metoprolol 25 MG 24 hr tablet                Primary Care Provider Office Phone # Fax #    Tameka Romo PA-C [phone]  [phone]       [address]  [facility] [address]        Equal Access to Services     JOLLY VIRGEN : Hadii aad ku hadlaneyo Soiainali, waaxda luqadaha, qaybta kaalmada adeegjohnda, fabian reinain hayaateresa plummerroxie marichuycamryn melissa dubois. So [facility] [phone].    ATENCIÓN: Si habla español, tiene a moreno disposición servicios gratuitos de asistencia lingüística. Llame al [phone].    We comply with applicable federal civil rights laws and [place] laws. We do not discriminate on the basis of race, color, national origin, age, disability sex, sexual orientation or gender identity.            Thank you!     Thank you for choosing [facility]  for your care. Our goal is always to provide you with excellent care. Hearing back from our patients is one way we can continue to improve our services. Please take a few minutes to complete the written survey that you may receive in the mail after your visit with us. Thank you!             Your Updated Medication List - Protect others around you: Learn how to safely use, store and throw away your medicines at www.disposemymeds.org.          This list is accurate as of: 8/28/17  2:29 PM.  Always use your most recent med list.                   Brand Name Dispense Instructions for use Diagnosis    ascorbic acid 1000 MG Tabs    vitamin C     Take 1,000 mg by mouth daily        aspirin 81 MG tablet     30 tablet    Take 1 tablet (81 mg) by mouth daily        atorvastatin 20 MG tablet    LIPITOR    90 tablet    Take 1 tablet (20 mg) by mouth daily    Carotid artery stenosis, asymptomatic, left       CALCIUM + D PO      Take 1 tablet by mouth daily.        erythromycin ophthalmic ointment    ROMYCIN    1 Tube    Place 1 Application into both eyes At Bedtime Apply small (1/4 inch) strip to affected eye(s)    Blepharitis, unspecified laterality       fluticasone 50 MCG/ACT spray    FLONASE    1 Bottle    Spray 1-2 sprays into both nostrils daily    Seasonal allergic rhinitis,  unspecified allergic rhinitis trigger       loteprednol 0.2 % Susp ophthalmic susp    ALREX    1 Bottle    Place 1 drop into both eyes 2 times daily as needed    Blepharitis, unspecified laterality       metoprolol 25 MG 24 hr tablet    TOPROL-XL    90 tablet    Take 1 tablet (25 mg) by mouth daily    Hypertension goal BP (blood pressure) < 140/90       MULTI-VITAMIN PO      Take 1 tablet by mouth daily.        omega-3 fatty acids 1200 MG capsule      Take 1 capsule by mouth daily        VAQTA 50 UNIT/ML injection   Generic drug:  hepatitis A vaccine      Reported on 4/4/2017

## 2017-08-28 NOTE — PATIENT INSTRUCTIONS
Please call to schedule your MRI scan or CT scan at [facility] by calling [phone].   Pulmonary Nodule  A pulmonary nodule is small area of abnormal tissue in the lung. It is usually found on an X-ray taken for other reasons. It is a single spot (lesion) up to about an inch in size, surrounded by normal lung tissue.  Most nodules are not cancerous (benign). However, a nodule could be an early stage of lung cancer. Or it may be a sign of cancer that has spread from another part of the body. When a nodule is found on a chest X-ray, further testing is needed to determine if it is benign or cancerous (malignant). To give your healthcare provider more information about the nodule, you may have one or more of these tests:    Comparison of a new X-ray to earlier X-rays    Chest CT scan    Bronchoscopy (a procedure that allows the healthcare provider to see the air passages inside the lung)    Needle biopsy  Test results    If your nodule is benign, continued follow-up over the next 5 years is usually advised.    If tests do not determine whether your nodule is benign or malignant, surgery may be advised.    If tests show that the nodule is definitely malignant, surgery will probably be advised.  The best survival rates from lung cancer occur when the original tumor is small (less than 1 inch). Follow your healthcare provider's advice on the timing of further testing. Prompt treatment gives the best chance of curing lung cancer.  Prevention  Smoking remains one of the biggest risk factors for lung cancer. If you smoke, it is essential that you quit to lower your risk of lung cancer. Talk to your healthcare provider about things that can help you quit, including medicines and support groups. See the following websites for more information:    www.smokefree.gov    www.quitnet.com  Home care  Most people with a pulmonary nodule have no symptoms. So no special home care is required. You may return  to your usual activities and diet.  Follow-up care  Follow up with your healthcare provider, or as advised.  More information about lung cancer is available from these resources:    American Lung Association: 860.622.1322, www.lung.org    National Cancer [place]: 379.958.2301, www.cancer.gov  When to seek medical advice  Call your healthcare provider right away if any of these occur:    Fever of 100.4 F (38 C) or higher    Unintended weight change  Call 911  Contact emergency services right away if any of these occur:    Coughing up blood    Chest pain or shortness of breath  Date Last Reviewed: 9/13/2015 2000-2017 [employer]. [address] [place] [address], [place] [address]. All rights reserved. This information is not intended as a substitute for professional medical care. Always follow your healthcare professional's instructions.

## 2017-08-28 NOTE — Clinical Note
I scheduled the chest CT scan for Diann John at [facility] and will manage the pulmonary nodule. She is not tolerating the lower dose of atorvastatin 20 mg, so did not want to go up to 40 mg. I will recheck her fasting lipids in 1 month to see if she is under 70 on her LDL. She understands better now why the ultrasound and CT results were different. Thank you for seeing her. Esmer Finley MD

## 2017-08-30 ENCOUNTER — TELEPHONE (OUTPATIENT)
Dept: FAMILY MEDICINE | Facility: CLINIC | Age: 82
End: 2017-08-30

## 2017-08-30 DIAGNOSIS — I10 ESSENTIAL HYPERTENSION WITH GOAL BLOOD PRESSURE LESS THAN 140/90: ICD-10-CM

## 2017-08-31 ENCOUNTER — RADIANT APPOINTMENT (OUTPATIENT)
Dept: CT IMAGING | Facility: CLINIC | Age: 82
End: 2017-08-31
Attending: FAMILY MEDICINE
Payer: COMMERCIAL

## 2017-08-31 DIAGNOSIS — R91.8 PULMONARY NODULES: ICD-10-CM

## 2017-08-31 PROCEDURE — 71250 CT THORAX DX C-: CPT | Performed by: RADIOLOGY

## 2017-08-31 RX ORDER — AMLODIPINE BESYLATE 2.5 MG/1
2.5 TABLET ORAL DAILY
Qty: 90 TABLET | Refills: 3 | Status: SHIPPED | OUTPATIENT
Start: 2017-08-31 | End: 2018-08-16

## 2017-08-31 NOTE — TELEPHONE ENCOUNTER
8/28/17 office visit notes reviewed, appears change in medication was patient requested.    Routing patient message to provider to review and advise.    Nelson Palacio RN

## 2017-08-31 NOTE — TELEPHONE ENCOUNTER
Called and left msg for pt regarding Dr. Finley's note below.  Diogenes Mckeon,  For Teams [employer] [facility]

## 2017-09-03 NOTE — PROGRESS NOTES
Please call patient with results (If unable to reach patient, this may be sent as a letter instead):    Dear Diann John,     The Chest CT shows 4 nodules in the lungs, the largest is 8 mm or less than 1 cm. There is also a cyst in the liver that can't be seen completely. They recommend follow up CT in 3 months to see if there are any changes in the largest nodule. This should also be done with an abdominal CT to look better at the cyst in the liver. If this is agreeable to you I will put in the referral to have both done in 3 months.       Esmer Finley MD

## 2017-09-05 ENCOUNTER — TELEPHONE (OUTPATIENT)
Dept: FAMILY MEDICINE | Facility: CLINIC | Age: 82
End: 2017-09-05

## 2017-09-05 DIAGNOSIS — K76.89 HEPATIC CYST: ICD-10-CM

## 2017-09-05 DIAGNOSIS — R91.8 PULMONARY NODULES: Primary | ICD-10-CM

## 2017-09-05 NOTE — TELEPHONE ENCOUNTER
Notes Recorded by Esmer Finley MD on 9/3/2017 at 5:55 PM  Please call patient with results (If unable to reach patient, this may be sent as a letter instead):    Dear Diann John,     The Chest CT shows 4 nodules in the lungs, the largest is 8 mm or less than 1 cm. There is also a cyst in the liver that can't be seen completely. They recommend follow up CT in 3 months to see if there are any changes in the largest nodule. This should also be done with an abdominal CT to look better at the cyst in the liver. If this is agreeable to you I will put in the referral to have both done in 3 months.       Esmer Finley MD    Pt updated on the above, verbalized understanding.  She would like to proceed with the plan. She was asking what she should do about lung nodules.  Marixa Soto RN

## 2017-09-05 NOTE — TELEPHONE ENCOUNTER
Please call patient with results (If unable to reach patient, this may be sent as a letter instead):     Dear Diann John,     The Chest CT shows 4 nodules in the lungs, the largest is 8 mm or less than 1 cm. There is also a cyst in the liver that can't be seen completely. They recommend follow up CT in 3 months to see if there are any changes in the largest nodule. This should also be done with an abdominal CT to look better at the cyst in the liver. If this is agreeable to you I will put in the referral to have both done in 3 months.       Esmer Finley MD     This writer attempted to contact Diann on 09/05/17    Reason for call abnormal results and left message that clinic will return call.    If patient calls back:   Patient contacted by clinic RN team. Inform patient that someone from the team will contact them, document that pt called and route to care team. .    Monika Prasad RN

## 2017-09-05 NOTE — TELEPHONE ENCOUNTER
Reason for Call:  Other Results    Detailed comments: Pt returning phone call and would like a phone call back regarding results    Phone Number Patient can be reached at: Home number on file [phone] (home)    Best Time: anytime    Can we leave a detailed message on this number? YES    Call taken on 9/5/2017 at 2:47 PM by Barrera Contreras

## 2017-09-06 NOTE — TELEPHONE ENCOUNTER
The lung nodule will need to be biopsied if it shows any signs of change, otherwise there is no treatment recommendation. These may be benign lung nodules and they do not need to be treated.   I put in referrals for CT scan of chest and abdomen in 3 months.     Please call to schedule your MRI scan or CT scan at [facility] by calling [phone].     Esmer Finley MD

## 2017-09-06 NOTE — TELEPHONE ENCOUNTER
Returned call to patient -     Patient is worried about recent findings below.      Writer when over the test results and provider notes below, explaining thoroughly with patient . Pt states she would prefer to have the CT abd and pelvis done now for piece of mind. Advised patient Dr. Finley does not think it needs to be done now but if patient would like to do this now, this should be fine. Explained need to wait for 3 month surveillance of lung nodules to repeat CT.    Pt verbalized understanding and was transferred to [facility] radiology scheduling - pt will set up CT abd/pelvis for now and CT chest for 3 months from now.    FYI to Dr. Tushar Palacio, RN

## 2017-09-06 NOTE — TELEPHONE ENCOUNTER
Patient returned call    Best number to reach them: Home number on file [phone] (home)    Is it ok to leave a detailed message: will be by phone   Patient seems confused about testing, states a  called her but did not think it was [facility] [place]. Has several questions;   And may have missed information due to hearing issue;   Thank you

## 2017-09-08 ENCOUNTER — OFFICE VISIT (OUTPATIENT)
Dept: OPHTHALMOLOGY | Facility: CLINIC | Age: 82
End: 2017-09-08
Payer: COMMERCIAL

## 2017-09-08 DIAGNOSIS — H35.371 MACULAR PUCKER, RIGHT EYE: ICD-10-CM

## 2017-09-08 DIAGNOSIS — H43.813 PVD (POSTERIOR VITREOUS DETACHMENT), BILATERAL: ICD-10-CM

## 2017-09-08 DIAGNOSIS — Z96.1 PSEUDOPHAKIA: Primary | ICD-10-CM

## 2017-09-08 DIAGNOSIS — H02.889 MGD (MEIBOMIAN GLAND DISEASE), UNSPECIFIED LATERALITY: ICD-10-CM

## 2017-09-08 DIAGNOSIS — H01.029 SQUAMOUS BLEPHARITIS, UNSPECIFIED LATERALITY: ICD-10-CM

## 2017-09-08 DIAGNOSIS — H52.4 PRESBYOPIA: ICD-10-CM

## 2017-09-08 PROCEDURE — 92014 COMPRE OPH EXAM EST PT 1/>: CPT | Performed by: STUDENT IN AN ORGANIZED HEALTH CARE EDUCATION/TRAINING PROGRAM

## 2017-09-08 PROCEDURE — 92015 DETERMINE REFRACTIVE STATE: CPT | Performed by: STUDENT IN AN ORGANIZED HEALTH CARE EDUCATION/TRAINING PROGRAM

## 2017-09-08 RX ORDER — ERYTHROMYCIN 5 MG/G
1 OINTMENT OPHTHALMIC AT BEDTIME
Qty: 1 TUBE | Refills: 11 | Status: SHIPPED | OUTPATIENT
Start: 2017-09-08 | End: 2018-09-11

## 2017-09-08 ASSESSMENT — VISUAL ACUITY
CORRECTION_TYPE: GLASSES
OS_CC: J1
OD_CC: J1
METHOD: SNELLEN - LINEAR
OD_CC: 20/20-2
OS_CC: 20/25+3

## 2017-09-08 ASSESSMENT — SLIT LAMP EXAM - LIDS
COMMENTS: TR BLEPHARITIS, 2+ MEIBOMIAN GLAND DYSFUNCTION
COMMENTS: TR BLEPHARITIS, 2+ MEIBOMIAN GLAND DYSFUNCTION

## 2017-09-08 ASSESSMENT — TONOMETRY
OS_IOP_MMHG: 15
IOP_METHOD: APPLANATION
OD_IOP_MMHG: 20

## 2017-09-08 ASSESSMENT — REFRACTION_MANIFEST
OS_CYLINDER: +2.25
OS_AXIS: 170
OD_SPHERE: -1.25
OD_CYLINDER: +3.75
OD_AXIS: 004
OS_SPHERE: -1.50
OS_ADD: +3.00
OD_ADD: +3.00

## 2017-09-08 ASSESSMENT — REFRACTION_WEARINGRX
OD_AXIS: 180
OS_ADD: +3.00
OD_SPHERE: -1.25
OS_CYLINDER: +2.25
OD_CYLINDER: +4.00
OS_SPHERE: -1.75
OD_ADD: +3.00
OS_AXIS: 170

## 2017-09-08 ASSESSMENT — CUP TO DISC RATIO
OD_RATIO: 0.7
OS_RATIO: 0.7

## 2017-09-08 ASSESSMENT — EXTERNAL EXAM - RIGHT EYE: OD_EXAM: NORMAL

## 2017-09-08 ASSESSMENT — EXTERNAL EXAM - LEFT EYE: OS_EXAM: NORMAL

## 2017-09-08 ASSESSMENT — CONF VISUAL FIELD
OD_NORMAL: 1
OS_NORMAL: 1

## 2017-09-08 NOTE — MR AVS SNAPSHOT
After Visit Summary   9/8/2017    Diann John    MRN: [id number]           Patient Information     Date Of Birth          7/31/1931        Visit Information        Provider Department      9/8/2017 9:00 AM Lisa Page MD [facility]        Today's Diagnoses     Pseudophakia OU    -  1    Macular pucker, right eye        PVD (posterior vitreous detachment), bilateral        Squamous blepharitis, unspecified laterality        MGD (meibomian gland disease), unspecified laterality        Presbyopia          Care Instructions    Continue ointment at bedtime both eyes   Warm compress as needed   Glasses prescription given - optional     Lisa Page MD  [phone]          Follow-ups after your visit        Follow-up notes from your care team     Return in about 1 year (around 9/8/2018) for Complete Exam.      Your next 10 appointments already scheduled     Sep 11, 2017  3:30 PM CDT   CT ABDOMEN PELVIS W/O & W CONTRAST with MGCT1   [facility]    [address] [place] [address] [phone]           Please bring any scans or X-rays taken at other hospitals, if similar tests were done. Also bring a list of your medicines, including vitamins, minerals and over-the-counter drugs. It is safest to leave personal items at home.  Be sure to tell your doctor:   If you have any allergies.   If there s any chance you are pregnant.   If you are breastfeeding.   If you have any special needs.  You may have contrast for this exam. To prepare:   Do not eat or drink for 2 hours before your exam. If you need to take medicine, you may take it with small sips of water. (We may ask you to take liquid medicine as well.)   The day before your exam, drink extra fluids at least six 8-ounce glasses (unless your doctor tells you to restrict your fluids).  Patients over 70 or patients with diabetes or kidney problems:   If you haven t  had a blood test (creatinine test) within the last 30 days, go to your clinic or Diagnostic Imaging Department for this test.  If you have diabetes:   If your kidney function is normal, continue taking your metformin (Avandamet, Glucophage, Glucovance, Metaglip) on the day of your exam.   If your kidney function is abnormal, wait 48 hours before restarting this medicine.  You will have oral contrast for this exam:   You will drink the contrast at home. Get this from your clinic or Diagnostic Imaging Department. Please follow the directions given.  Please wear loose clothing, such as a sweat suit or jogging clothes. Avoid snaps, zippers and other metal. We may ask you to undress and put on a hospital gown.  If you have any questions, please call the Imaging Department where you will have your exam.            Oct 03, 2017  7:30 AM CDT   LAB with BK LAB   [facility]    [address] [facility] [address] [phone]           Patient must bring picture ID. Patient should be prepared to give a urine specimen  Please do not eat 10-12 hours before your appointment if you are coming in fasting for labs on lipids, cholesterol, or glucose (sugar). Pregnant women should follow their Care Team instructions. Water with medications is okay. Do not drink coffee or other fluids. If you have concerns about taking  your medications, please ask at office or if scheduling via [employer], send a message by clicking on Secure Messaging, Message Your Care Team.            Dec 06, 2017  1:00 PM CST   CT CHEST W/O CONTRAST with MGCT1   [facility]    [id number] [place] [address] [phone]           Please bring any scans or X-rays taken at other hospitals, if similar tests were done. Also bring a list of your medicines, including vitamins, minerals and over-the-counter drugs. It is safest to leave  "personal items at home.  Be sure to tell your doctor:   If you have any allergies.   If there s any chance you are pregnant.   If you are breastfeeding.   If you have any special needs.  You do not need to do anything special to prepare.  Please wear loose clothing, such as a sweat suit or jogging clothes. Avoid snaps, zippers and other metal. We may ask you to undress and put on a hospital gown.              Who to contact     If you have questions or need follow up information about today's clinic visit or your schedule please contact [facility] directly at [phone].  Normal or non-critical lab and imaging results will be communicated to you by [employer], letter or phone within 4 business days after the clinic has received the results. If you do not hear from us within 7 days, please contact the clinic through [facility] or phone. If you have a critical or abnormal lab result, we will notify you by phone as soon as possible.  Submit refill requests through [facility] or call your pharmacy and they will forward the refill request to us. Please allow 3 business days for your refill to be completed.          Additional Information About Your Visit        [facility] Information     [facility] lets you send messages to your doctor, view your test results, renew your prescriptions, schedule appointments and more. To sign up, go to [email or web address] . Click on \"Log in\" on the left side of the screen, which will take you to the Welcome page. Then click on \"Sign up Now\" on the right side of the page.     You will be asked to enter the access code listed below, as well as some personal information. Please follow the directions to create your username and password.     Your access code is: [email or web address]  Expires: 2017 10:56 AM     Your access code will  in 90 days. If you need help or a new code, please call your [place] clinic or [phone].        Care EveryWhere ID     This is your Care EveryWhere " ID. This could be used by other organizations to access your [facility] medical records  [id number]         Blood Pressure from Last 3 Encounters:   08/28/17 124/68   08/14/17 149/72   06/01/17 126/72    Weight from Last 3 Encounters:   08/28/17 51.7 kg (114 lb)   08/14/17 52.2 kg (115 lb)   06/01/17 52.6 kg (116 lb)              We Performed the Following     EYE EXAM (SIMPLE-NONBILLABLE)     REFRACTIVE STATUS          Where to get your medicines      These medications were sent to [facility] [address] [facility], [place] - [address]  [address] [facility] [address]     Phone:  [phone]     erythromycin ophthalmic ointment          Primary Care Provider Office Phone # Fax #    Esmer Krysten Finley [phone]       [address]  [facility] [address]        Equal Access to Services     KARINE VIRGEN : Hadii aad ku hadasho Soomaali, waaxda luqadaha, qaybta kaalmada adeegyada, waxay idiin haychakan carlos torres . So [facility] [phone].    ATENCIÓN: Si habla español, tiene a moreno disposición servicios gratuitos de asistencia lingüística. Llame al [phone].    We comply with applicable federal civil rights laws and [place] laws. We do not discriminate on the basis of race, color, national origin, age, disability sex, sexual orientation or gender identity.            Thank you!     Thank you for choosing [facility]  for your care. Our goal is always to provide you with excellent care. Hearing back from our patients is one way we can continue to improve our services. Please take a few minutes to complete the written survey that you may receive in the mail after your visit with us. Thank you!             Your Updated Medication List - Protect others around you: Learn how to safely use, store and throw away your medicines at www.disposemymeds.org.          This list is accurate as of: 9/8/17 10:59 AM.  Always use your most recent med  list.                   Brand Name Dispense Instructions for use Diagnosis    amLODIPine 2.5 MG tablet    NORVASC    90 tablet    Take 1 tablet (2.5 mg) by mouth daily    Essential hypertension with goal blood pressure less than 140/90       ascorbic acid 1000 MG Tabs    vitamin C     Take 1,000 mg by mouth daily        aspirin 81 MG tablet     30 tablet    Take 1 tablet (81 mg) by mouth daily        atorvastatin 20 MG tablet    LIPITOR    90 tablet    Take 1 tablet (20 mg) by mouth daily    Carotid artery stenosis, asymptomatic, left       CALCIUM + D PO      Take 1 tablet by mouth daily.        erythromycin ophthalmic ointment    ROMYCIN    1 Tube    Place 1 Application into both eyes At Bedtime Apply small (1/4 inch) strip to affected eye(s)    Squamous blepharitis, unspecified laterality       fluticasone 50 MCG/ACT spray    FLONASE    1 Bottle    Spray 1-2 sprays into both nostrils daily    Seasonal allergic rhinitis, unspecified allergic rhinitis trigger       loteprednol 0.2 % Susp ophthalmic susp    ALREX    1 Bottle    Place 1 drop into both eyes 2 times daily as needed    Blepharitis, unspecified laterality       MULTI-VITAMIN PO      Take 1 tablet by mouth daily.        omega-3 fatty acids 1200 MG capsule      Take 1 capsule by mouth daily        VAQTA 50 UNIT/ML injection   Generic drug:  hepatitis A vaccine      Reported on 4/4/2017

## 2017-09-08 NOTE — PROGRESS NOTES
Current Eye Medications: e oint nightly, alrex occasionally.     Subjective: Comprehensive eye exam.   Pt reports tat see good , however her  excessive tearing will make her vision blurry, pt finds e oint helps her eyes alrex does not   Objective:  See Ophthalmology Exam.       Assessment:      Plan:   See Patient Instructions.     Current Eye Medications:  Eyrtromycin oint. Nightly ou and Alrex occasionally.     Subjective: Comprehensive eye exam.  Pt reports that her eyes continue to water and causes her vision to fluctuate from good to poor. Pt states her e-oint helps a lot, however her Alrex does not seem to do much or even can make her eyes feel worse.     Objective:  See Ophthalmology Exam.      Assessment:  Diann John is a 86 year old female who presents with:   Encounter Diagnoses   Name Primary?     Pseudophakia OU      Macular pucker, right eye      PVD (posterior vitreous detachment), bilateral      Squamous blepharitis, unspecified laterality      MGD (meibomian gland disease), unspecified laterality        Plan:  Continue erythromycin ointment at bedtime both eyes   Warm compress as needed   Glasses prescription given - optional     Lisa Page MD  [phone]

## 2017-09-08 NOTE — PATIENT INSTRUCTIONS
Continue ointment at bedtime both eyes   Warm compress as needed   Glasses prescription given - optional     Lisa Page MD  [phone]

## 2017-09-11 ENCOUNTER — RADIANT APPOINTMENT (OUTPATIENT)
Dept: CT IMAGING | Facility: CLINIC | Age: 82
End: 2017-09-11
Attending: FAMILY MEDICINE
Payer: COMMERCIAL

## 2017-09-11 DIAGNOSIS — K76.89 HEPATIC CYST: ICD-10-CM

## 2017-09-11 LAB
CREAT BLD-MCNC: 0.8 MG/DL (ref 0.52–1.04)
GFR SERPL CREATININE-BSD FRML MDRD: 68 ML/MIN/1.7M2

## 2017-09-11 PROCEDURE — 82565 ASSAY OF CREATININE: CPT | Performed by: FAMILY MEDICINE

## 2017-09-11 PROCEDURE — 74177 CT ABD & PELVIS W/CONTRAST: CPT | Performed by: RADIOLOGY

## 2017-09-11 PROCEDURE — 36415 COLL VENOUS BLD VENIPUNCTURE: CPT | Performed by: FAMILY MEDICINE

## 2017-09-11 RX ORDER — IOPAMIDOL 755 MG/ML
70 INJECTION, SOLUTION INTRAVASCULAR ONCE
Status: COMPLETED | OUTPATIENT
Start: 2017-09-11 | End: 2017-09-11

## 2017-09-11 RX ADMIN — IOPAMIDOL 70 ML: 755 INJECTION, SOLUTION INTRAVASCULAR at 15:28

## 2017-09-11 NOTE — LETTER
[facility]  [address] [facility] [address]  Phone [phone]                 Diann John  [address] [facility] [address] [address]        September 14, 2017        Dear Diann John,    Your test results are attached. I am happy to let you know that they are stable and your medications can stay the same.    The CT of your liver was normal.      Results for orders placed or performed in visit on 09/11/17   CT Abdomen Pelvis w Contrast    Narrative    EXAMINATION: CT ABDOMEN PELVIS W CONTRAST, 9/11/2017 3:30 PM    TECHNIQUE:  Helical CT images from the lung bases through the  symphysis pubis were obtained  with IV contrast. Contrast dose: 70 ml  isovue 370    COMPARISON: None    HISTORY: hepatic cyst seen incidentally on a work up of an incidental  pulmonary nodule., Other specified diseases of liver    FINDINGS:  Liver: Multiple simple fluid attenuation hepatic cysts, including a  1.6 cm nonenhancing lesion which appears to be connected to a dilated  bile duct in segment 8 of the liver series 7 image 33. Additional 6 mm  area with similar characteristics in segment 5, series 7 image 41.  Several other tiny hypodense lesions are noted mainly along the  margins i.e. series 7 image 23 series 7 image 57 series 7 image 42.  Bile ducts: Mild diffuse intrahepatic bile duct dilation. Common bile  duct measuring 8 mm without evidence of definite obstruction. Punctate  focus of gas near the ampulla (series 7, image 58) may be within a  stone.  Gallbladder: Gallbladder is decompressed. No pericholecystic fluid.    Pancreas: Mild dilatation of the main pancreatic duct measuring 3-4  mm. Punctate parenchymal calcification at the pancreatic tail is best  seen on series 7 image 39 and series 9 image 34.  Spleen: Spleen appears unremarkable and normal size.   Adrenals: Question 1 cm left adrenal nodule versus adrenal puffiness.  Right adrenal is unremarkable.    Kidneys: No hydronephrosis. No suspicious lesions in the kidneys. No  radiopaque stone.   Bowel: Normal caliber. Diverticulosis without evidence of  diverticulitis.   Mesenteric lymph nodes: No enlarged mesenteric lymph nodes.   Peritoneum: No ascites or free air; no fluid collection.   Retroperitoneum: Unremarkable.  Vessels: Atherosclerotic changes of the aorta. The patent main  abdominal vessels.  Lung Bases: Erythematous changes in the lung bases with areas of  scarring. Prominent right lower lobe cyst. Findings lung bases are  unchanged from prior CT.    PELVIS:    Reproductive organs: No pelvic masses.   Bladder: Bladder is partially distended and appears grossly  unremarkable.     BONES: Spinal degenerative changes. No suspicious osseous lesion.  Bones appear to be osteopenic.      Impression    IMPRESSION:   1. There is a dominant hepatic cystic lesion in segment 8 which  appears to communicate with the intrahepatic biliary tree. No  worrisome features. This is nonspecific and could represent a simple  cyst, an intrahepatic choledochal cyst, a biloma or a cystic biliary  neoplasm. Six-month follow-up would be beneficial, potentially with  MRCP. Additional small hepatic cysts.  2. Mild intrahepatic and extrahepatic biliary ductal dilatation with  associated mild pancreatic ductal dilatation. There is the suggestion  of a periampullary stone. Correlation for symptoms of biliary colic be  beneficial. Ultrasound of potentially MRCP could be considered.  4. Small and stable indeterminate left adrenal nodule. Given small  size, follow-up CT in 12 months would be beneficial.    I have personally reviewed the examination and initial interpretation  and I agree with the findings.    ANDRES YE MD   Creatinine POCT   Result Value Ref Range    Creatinine 0.8 0.52 - 1.04 mg/dL    GFR Estimate 68 >60 mL/min/1.7m2    GFR Estimate If Black 82 >60 mL/min/1.7m2       Please call me if you have any questions about these  test results or about your care.    Sincerely,      Esmer Finley MD/v

## 2017-09-13 ENCOUNTER — TELEPHONE (OUTPATIENT)
Dept: FAMILY MEDICINE | Facility: CLINIC | Age: 82
End: 2017-09-13

## 2017-09-13 NOTE — TELEPHONE ENCOUNTER
Triage to call patient during business hours tomorrow morning to discuss result notes below.    Result Notes   Notes Recorded by Esmer Finley MD on 9/13/2017 at 5:08 PM  Dear Diann John,    Your test results are attached. I am happy to let you know that they are stable and your medications can stay the same.    The CT of your liver was normal.      Please call me if you have any questions about these test results or about your care.    Sincerely,    Esmer Finley MD

## 2017-09-13 NOTE — PROGRESS NOTES
Dear Diann John,    Your test results are attached. I am happy to let you know that they are stable and your medications can stay the same.    The CT of your liver was normal.      Please call me if you have any questions about these test results or about your care.    Sincerely,    Esmer Finley MD

## 2017-09-13 NOTE — LETTER
September 14, 2017      Diann John  [address] [address]        Dear ,    We are writing to inform you of your test results.    Your test results are attached. I am happy to let you know that they are stable and your medications can stay the same.    The CT of your liver was normal.      Please call me if you have any questions about these test results or about your care.     Results for orders placed or performed in visit on 09/11/17   CT Abdomen Pelvis w Contrast    Narrative    EXAMINATION: CT ABDOMEN PELVIS W CONTRAST, 9/11/2017 3:30 PM    TECHNIQUE:  Helical CT images from the lung bases through the  symphysis pubis were obtained  with IV contrast. Contrast dose: 70 ml  isovue 370    COMPARISON: None    HISTORY: hepatic cyst seen incidentally on a work up of an incidental  pulmonary nodule., Other specified diseases of liver    FINDINGS:  Liver: Multiple simple fluid attenuation hepatic cysts, including a  1.6 cm nonenhancing lesion which appears to be connected to a dilated  bile duct in segment 8 of the liver series 7 image 33. Additional 6 mm  area with similar characteristics in segment 5, series 7 image 41.  Several other tiny hypodense lesions are noted mainly along the  margins i.e. series 7 image 23 series 7 image 57 series 7 image 42.  Bile ducts: Mild diffuse intrahepatic bile duct dilation. Common bile  duct measuring 8 mm without evidence of definite obstruction. Punctate  focus of gas near the ampulla (series 7, image 58) may be within a  stone.  Gallbladder: Gallbladder is decompressed. No pericholecystic fluid.    Pancreas: Mild dilatation of the main pancreatic duct measuring 3-4  mm. Punctate parenchymal calcification at the pancreatic tail is best  seen on series 7 image 39 and series 9 image 34.  Spleen: Spleen appears unremarkable and normal size.   Adrenals: Question 1 cm left adrenal nodule versus adrenal puffiness.  Right adrenal is unremarkable.    Kidneys: No hydronephrosis. No suspicious lesions in the kidneys. No  radiopaque stone.   Bowel: Normal caliber. Diverticulosis without evidence of  diverticulitis.   Mesenteric lymph nodes: No enlarged mesenteric lymph nodes.   Peritoneum: No ascites or free air; no fluid collection.   Retroperitoneum: Unremarkable.  Vessels: Atherosclerotic changes of the aorta. The patent main  abdominal vessels.  Lung Bases: Erythematous changes in the lung bases with areas of  scarring. Prominent right lower lobe cyst. Findings lung bases are  unchanged from prior CT.    PELVIS:    Reproductive organs: No pelvic masses.   Bladder: Bladder is partially distended and appears grossly  unremarkable.     BONES: Spinal degenerative changes. No suspicious osseous lesion.  Bones appear to be osteopenic.      Impression    IMPRESSION:   1. There is a dominant hepatic cystic lesion in segment 8 which  appears to communicate with the intrahepatic biliary tree. No  worrisome features. This is nonspecific and could represent a simple  cyst, an intrahepatic choledochal cyst, a biloma or a cystic biliary  neoplasm. Six-month follow-up would be beneficial, potentially with  MRCP. Additional small hepatic cysts.  2. Mild intrahepatic and extrahepatic biliary ductal dilatation with  associated mild pancreatic ductal dilatation. There is the suggestion  of a periampullary stone. Correlation for symptoms of biliary colic be  beneficial. Ultrasound of potentially MRCP could be considered.  4. Small and stable indeterminate left adrenal nodule. Given small  size, follow-up CT in 12 months would be beneficial.    I have personally reviewed the examination and initial interpretation  and I agree with the findings.    ANDRES YE MD   Creatinine POCT   Result Value Ref Range    Creatinine 0.8 0.52 - 1.04 mg/dL    GFR Estimate 68 >60 mL/min/1.7m2    GFR Estimate If Black 82 >60 mL/min/1.7m2       If you have any questions or concerns, please call  the clinic at the number listed above.       Sincerely,        Esmer Finley MD

## 2017-09-13 NOTE — TELEPHONE ENCOUNTER
Reason for Call:  Other call back    Detailed comments: Dinan had a CT scan done on 09/11 and is calling for her results. Please call her as soon as you serge discuss those results. Thank you    Phone Number Patient can be reached at: Home number on file [phone] (home)    Best Time: Any    Can we leave a detailed message on this number? NO    Call taken on 9/13/2017 at 6:28 PM by Cheo Martin

## 2017-10-02 ENCOUNTER — TELEPHONE (OUTPATIENT)
Dept: FAMILY MEDICINE | Facility: CLINIC | Age: 82
End: 2017-10-02

## 2017-10-02 NOTE — TELEPHONE ENCOUNTER
Called and informed the patient of ok to cancel test.    Monika Prasad RN, [facility]

## 2017-10-02 NOTE — TELEPHONE ENCOUNTER
Reason for Call:  Other     Detailed comments: Patient has had 3 CT's done and patient is having a mammogram tomorrow at 8:45am and worried if all these test have radiation and if so, is it too much to have if she has her mammogram tomorrow? Patient asking if she should cancel appt or not?    Phone Number Patient can be reached at: Home number on file [phone] (home)    Best Time: any    Can we leave a detailed message on this number? YES    Call taken on 10/2/2017 at 1:36 PM by Eufemia Man

## 2017-10-03 DIAGNOSIS — E78.5 HYPERLIPIDEMIA LDL GOAL <70: ICD-10-CM

## 2017-10-03 DIAGNOSIS — I10 HYPERTENSION GOAL BP (BLOOD PRESSURE) < 140/90: ICD-10-CM

## 2017-10-03 LAB
ALBUMIN SERPL-MCNC: 3.7 G/DL (ref 3.4–5)
ANION GAP SERPL CALCULATED.3IONS-SCNC: 4 MMOL/L (ref 3–14)
BUN SERPL-MCNC: 18 MG/DL (ref 7–30)
CALCIUM SERPL-MCNC: 8.9 MG/DL (ref 8.5–10.1)
CHLORIDE SERPL-SCNC: 102 MMOL/L (ref 94–109)
CHOLEST SERPL-MCNC: 123 MG/DL
CO2 SERPL-SCNC: 31 MMOL/L (ref 20–32)
CREAT SERPL-MCNC: 0.78 MG/DL (ref 0.52–1.04)
ERYTHROCYTE [DISTWIDTH] IN BLOOD BY AUTOMATED COUNT: 12.9 % (ref 10–15)
GFR SERPL CREATININE-BSD FRML MDRD: 70 ML/MIN/1.7M2
GLUCOSE SERPL-MCNC: 80 MG/DL (ref 70–99)
HCT VFR BLD AUTO: 40.4 % (ref 35–47)
HDLC SERPL-MCNC: 68 MG/DL
HGB BLD-MCNC: 13.5 G/DL (ref 11.7–15.7)
LDLC SERPL CALC-MCNC: 43 MG/DL
MCH RBC QN AUTO: 31.1 PG (ref 26.5–33)
MCHC RBC AUTO-ENTMCNC: 33.4 G/DL (ref 31.5–36.5)
MCV RBC AUTO: 93 FL (ref 78–100)
NONHDLC SERPL-MCNC: 55 MG/DL
PHOSPHATE SERPL-MCNC: 3.5 MG/DL (ref 2.5–4.5)
PLATELET # BLD AUTO: 181 10E9/L (ref 150–450)
POTASSIUM SERPL-SCNC: 4 MMOL/L (ref 3.4–5.3)
RBC # BLD AUTO: 4.34 10E12/L (ref 3.8–5.2)
SODIUM SERPL-SCNC: 137 MMOL/L (ref 133–144)
TRIGL SERPL-MCNC: 58 MG/DL
WBC # BLD AUTO: 3.5 10E9/L (ref 4–11)

## 2017-10-03 PROCEDURE — 36415 COLL VENOUS BLD VENIPUNCTURE: CPT | Performed by: FAMILY MEDICINE

## 2017-10-03 PROCEDURE — 80061 LIPID PANEL: CPT | Performed by: FAMILY MEDICINE

## 2017-10-03 PROCEDURE — 85027 COMPLETE CBC AUTOMATED: CPT | Performed by: FAMILY MEDICINE

## 2017-10-03 PROCEDURE — 80069 RENAL FUNCTION PANEL: CPT | Performed by: FAMILY MEDICINE

## 2017-10-03 NOTE — LETTER
October 3, 2017      Diann John  [address] [address]        Dear Diann,    Your test results are attached. I am happy to let you know that they are stable and your medications can stay the same.    The blood sugar is normal and you do not have diabetes. The kidneys are healthy. The cholesterol looks great. We can recheck labs in 1 year.     Resulted Orders   Lipid panel reflex to direct LDL   Result Value Ref Range    Cholesterol 123 <200 mg/dL    Triglycerides 58 <150 mg/dL      Comment:      Fasting specimen    HDL Cholesterol 68 >49 mg/dL    LDL Cholesterol Calculated 43 <100 mg/dL      Comment:      Desirable:       <100 mg/dl    Non HDL Cholesterol 55 <130 mg/dL   CBC with platelets   Result Value Ref Range    WBC 3.5 (L) 4.0 - 11.0 10e9/L    RBC Count 4.34 3.8 - 5.2 10e12/L    Hemoglobin 13.5 11.7 - 15.7 g/dL    Hematocrit 40.4 35.0 - 47.0 %    MCV 93 78 - 100 fl    MCH 31.1 26.5 - 33.0 pg    MCHC 33.4 31.5 - 36.5 g/dL    RDW 12.9 10.0 - 15.0 %    Platelet Count 181 150 - 450 10e9/L   Renal panel   Result Value Ref Range    Sodium 137 133 - 144 mmol/L    Potassium 4.0 3.4 - 5.3 mmol/L    Chloride 102 94 - 109 mmol/L    Carbon Dioxide 31 20 - 32 mmol/L    Anion Gap 4 3 - 14 mmol/L    Glucose 80 70 - 99 mg/dL      Comment:      Fasting specimen    Urea Nitrogen 18 7 - 30 mg/dL    Creatinine 0.78 0.52 - 1.04 mg/dL    GFR Estimate 70 >60 mL/min/1.7m2      Comment:      Non  GFR Calc    GFR Estimate If Black 85 >60 mL/min/1.7m2      Comment:       GFR Calc    Calcium 8.9 8.5 - 10.1 mg/dL    Phosphorus 3.5 2.5 - 4.5 mg/dL    Albumin 3.7 3.4 - 5.0 g/dL     Please call me if you have any questions about these test results or about your care.    Sincerely,      Esmer Finley MD/koki

## 2017-10-03 NOTE — PROGRESS NOTES
Dear Diann John,    Your test results are attached. I am happy to let you know that they are stable and your medications can stay the same.    The blood sugar is normal and you do not have diabetes. The kidneys are healthy. The cholesterol looks great. We can recheck labs in 1 year.     Please call me if you have any questions about these test results or about your care.    Sincerely,    Esmer Finley MD

## 2017-10-15 ENCOUNTER — TELEPHONE (OUTPATIENT)
Dept: FAMILY MEDICINE | Facility: CLINIC | Age: 82
End: 2017-10-15

## 2017-10-15 DIAGNOSIS — R91.8 PULMONARY NODULES: Primary | ICD-10-CM

## 2017-10-16 NOTE — TELEPHONE ENCOUNTER
Call patient with vascular surgery recommendation for follow up with thoracic surgeon instead of waiting.   Esmer Finley MD

## 2017-10-17 ENCOUNTER — ALLIED HEALTH/NURSE VISIT (OUTPATIENT)
Dept: NURSING | Facility: CLINIC | Age: 82
End: 2017-10-17
Payer: COMMERCIAL

## 2017-10-17 DIAGNOSIS — Z23 NEED FOR PROPHYLACTIC VACCINATION AND INOCULATION AGAINST INFLUENZA: Primary | ICD-10-CM

## 2017-10-17 PROCEDURE — 90662 IIV NO PRSV INCREASED AG IM: CPT

## 2017-10-17 PROCEDURE — G0008 ADMIN INFLUENZA VIRUS VAC: HCPCS

## 2017-10-17 PROCEDURE — 99207 ZZC NO CHARGE NURSE ONLY: CPT

## 2017-10-17 NOTE — MR AVS SNAPSHOT
After Visit Summary   10/17/2017    Diann John    MRN: [id number]           Patient Information     Date Of Birth          7/31/1931        Visit Information        Provider Department      10/17/2017 9:00 AM BK ANCILLARY [facility]        Today's Diagnoses     Need for prophylactic vaccination and inoculation against influenza    -  1       Follow-ups after your visit        Your next 10 appointments already scheduled     Dec 06, 2017  1:00 PM CST   CT CHEST W/O CONTRAST with MGCT1   [facility]    [address] [place] [id number] [phone]           Please bring any scans or X-rays taken at other hospitals, if similar tests were done. Also bring a list of your medicines, including vitamins, minerals and over-the-counter drugs. It is safest to leave personal items at home.  Be sure to tell your doctor:   If you have any allergies.   If there s any chance you are pregnant.   If you are breastfeeding.   If you have any special needs.  You do not need to do anything special to prepare.  Please wear loose clothing, such as a sweat suit or jogging clothes. Avoid snaps, zippers and other metal. We may ask you to undress and put on a hospital gown.              Who to contact     If you have questions or need follow up information about today's clinic visit or your schedule please contact [facility] directly at [phone].  Normal or non-critical lab and imaging results will be communicated to you by MyChart, letter or phone within 4 business days after the clinic has received the results. If you do not hear from us within 7 days, please contact the clinic through MyChart or phone. If you have a critical or abnormal lab result, we will notify you by phone as soon as possible.  Submit refill requests through O[employer] or call your pharmacy and they will forward the refill request to us. Please allow  "3 business days for your refill to be completed.          Additional Information About Your Visit        MyChart Information     Followap lets you send messages to your doctor, view your test results, renew your prescriptions, schedule appointments and more. To sign up, go to [email or web address] . Click on \"Log in\" on the left side of the screen, which will take you to the Welcome page. Then click on \"Sign up Now\" on the right side of the page.     You will be asked to enter the access code listed below, as well as some personal information. Please follow the directions to create your username and password.     Your access code is: [email or web address]  Expires: 2017 10:56 AM     Your access code will  in 90 days. If you need help or a new code, please call your [place] clinic or [phone].        Care EveryWhere ID     This is your Care EveryWhere ID. This could be used by other organizations to access your [place] medical records  [id number]         Blood Pressure from Last 3 Encounters:   17 124/68   17 149/72   17 126/72    Weight from Last 3 Encounters:   17 114 lb (51.7 kg)   17 115 lb (52.2 kg)   17 116 lb (52.6 kg)              We Performed the Following     FLU VACCINE, INCREASED ANTIGEN, PRESV FREE, AGE 65+ [05650]     Vaccine Administration, Initial [62491]        Primary Care Provider Office Phone # Fax #    Esmer Krysten Finley [phone]       [address]  [facility] [address]        Equal Access to Services     KARINE VIRGEN : Hadii reji iCd, joni siu, qafabian velasquez. So [facility] [phone].    ATENCIÓN: Si habla español, tiene a moreno disposición servicios gratuitos de asistencia lingüística. Llame al [phone].    We comply with applicable federal civil rights laws and [place] laws. We do not discriminate on the basis of race, color, national origin, " age, disability, sex, sexual orientation, or gender identity.            Thank you!     Thank you for choosing [facility]  for your care. Our goal is always to provide you with excellent care. Hearing back from our patients is one way we can continue to improve our services. Please take a few minutes to complete the written survey that you may receive in the mail after your visit with us. Thank you!             Your Updated Medication List - Protect others around you: Learn how to safely use, store and throw away your medicines at www.disposemymeds.org.          This list is accurate as of: 10/17/17 12:51 PM.  Always use your most recent med list.                   Brand Name Dispense Instructions for use Diagnosis    amLODIPine 2.5 MG tablet    NORVASC    90 tablet    Take 1 tablet (2.5 mg) by mouth daily    Essential hypertension with goal blood pressure less than 140/90       ascorbic acid 1000 MG Tabs    vitamin C     Take 1,000 mg by mouth daily        aspirin 81 MG tablet     30 tablet    Take 1 tablet (81 mg) by mouth daily        atorvastatin 20 MG tablet    LIPITOR    90 tablet    Take 1 tablet (20 mg) by mouth daily    Carotid artery stenosis, asymptomatic, left       CALCIUM + D PO      Take 1 tablet by mouth daily.        erythromycin ophthalmic ointment    ROMYCIN    1 Tube    Place 1 Application into both eyes At Bedtime Apply small (1/4 inch) strip to affected eye(s)    Squamous blepharitis, unspecified laterality       fluticasone 50 MCG/ACT spray    FLONASE    1 Bottle    Spray 1-2 sprays into both nostrils daily    Seasonal allergic rhinitis, unspecified allergic rhinitis trigger       loteprednol 0.2 % Susp ophthalmic susp    ALREX    1 Bottle    Place 1 drop into both eyes 2 times daily as needed    Blepharitis, unspecified laterality       MULTI-VITAMIN PO      Take 1 tablet by mouth daily.        omega-3 fatty acids 1200 MG capsule      Take 1 capsule by mouth daily         VAQTA 50 UNIT/ML injection   Generic drug:  hepatitis A vaccine      Reported on 4/4/2017

## 2017-10-17 NOTE — PROGRESS NOTES
Injectable Influenza Immunization Documentation    1.  Is the person to be vaccinated sick today?   No    2. Does the person to be vaccinated have an allergy to a component   of the vaccine?   No    3. Has the person to be vaccinated ever had a serious reaction   to influenza vaccine in the past?   No    4. Has the person to be vaccinated ever had Guillain-Barré syndrome?   No    Form completed by ROMÁN Pizarro MA

## 2017-10-27 NOTE — TELEPHONE ENCOUNTER
Follow up CT scan recommended for next month. I also put in a referral for evaluation by the nodule clinic so that they can review the CT scan results and recommend further evaluation or treatment per vascular surgery recommendations.  Esmer Finley MD

## 2017-10-30 NOTE — TELEPHONE ENCOUNTER
Patient has an appointment in [place] for Dec 6 at 1 PM at nodule clinic- is this ok? Patient would prefer to have it sooner. Also it sounds like another CT scan needs to be done I do not see a new referral in place.  Routing to provider  to review and advise.  Sharon Landeros RN.

## 2017-10-30 NOTE — TELEPHONE ENCOUNTER
Called and advised patient that follow up CT is in and can call to schedule.  Sharon Landeros RN.

## 2017-10-30 NOTE — TELEPHONE ENCOUNTER
There is an order in the order set for a follow up CT scan instead of under referrals. Call [facility] to schedule.  Esmer Finley MD

## 2017-11-01 ENCOUNTER — RADIANT APPOINTMENT (OUTPATIENT)
Dept: CT IMAGING | Facility: CLINIC | Age: 82
End: 2017-11-01
Attending: FAMILY MEDICINE
Payer: COMMERCIAL

## 2017-11-01 DIAGNOSIS — R91.8 PULMONARY NODULES: ICD-10-CM

## 2017-11-01 LAB — RADIOLOGIST FLAGS: NORMAL

## 2017-11-01 PROCEDURE — 71250 CT THORAX DX C-: CPT | Performed by: RADIOLOGY

## 2017-11-01 NOTE — PROGRESS NOTES
these results are reassuring and she does not need to follow up with nodule clinic.  Esmer Finley MD

## 2017-11-01 NOTE — PROGRESS NOTES
Please call patient with results (If unable to reach patient, this may be sent as a letter instead):    Dear Diann John,     The CT scan looks much better and the nodule is going away. They recommend follow up in 1 year to make sure it goes away completely. This is very good news.       Esmer Finley MD

## 2017-11-02 ENCOUNTER — TELEPHONE (OUTPATIENT)
Dept: FAMILY MEDICINE | Facility: CLINIC | Age: 82
End: 2017-11-02

## 2017-11-02 NOTE — TELEPHONE ENCOUNTER
Patient returned call    Best number to reach them: Home number on file [phone] (home)    Is it ok to leave a detailed message: YES

## 2017-11-02 NOTE — TELEPHONE ENCOUNTER
Notes Recorded by Esmer Finley MD on 11/1/2017 at 6:17 PM  Does not need to follow up with pulmonary nodule clinic.  Esmer Finley MD  ------    Notes Recorded by Nelson Palacio, RN on 11/1/2017 at 5:43 PM  Please clarify, so patient does not need to follow up then with nodule clinic / thoracic surgery or vascular surgery with these new findings?  ------    Notes Recorded by Esmer Finley MD on 11/1/2017 at 5:14 PM  The nodule is improving on follow up CT scan. Esmer Finley MD  ------    Notes Recorded by Esmer Finley MD on 11/1/2017 at 5:12 PM  Please call patient with results (If unable to reach patient, this may be sent as a letter instead):    Dear Diann John,     The CT scan looks much better and the nodule is going away. They recommend follow up in 1 year to make sure it goes away completely. This is very good news.       Esmer Finley MD    This writer attempted to contact Diann on 11/02/17      Reason for call results and left message that clinic will return call.      If patient calls back:   Patient contacted by clinic RN team. Inform patient that someone from the team will contact them, document that pt called and route to care team. .        Monika Prasad, RN

## 2017-11-02 NOTE — TELEPHONE ENCOUNTER
Called and informed the patient of the following stating.   Notes Recorded by Esmer Finley MD on 11/1/2017 at 6:17 PM  Does not need to follow up with pulmonary nodule clinic.  MD Monika Lemons RN, [facility] Triage

## 2018-02-15 ENCOUNTER — OFFICE VISIT (OUTPATIENT)
Dept: FAMILY MEDICINE | Facility: CLINIC | Age: 83
End: 2018-02-15
Payer: COMMERCIAL

## 2018-02-15 VITALS
WEIGHT: 114 LBS | DIASTOLIC BLOOD PRESSURE: 74 MMHG | HEIGHT: 61 IN | BODY MASS INDEX: 21.52 KG/M2 | SYSTOLIC BLOOD PRESSURE: 128 MMHG | TEMPERATURE: 96.7 F | HEART RATE: 83 BPM | OXYGEN SATURATION: 98 %

## 2018-02-15 DIAGNOSIS — I10 HYPERTENSION GOAL BP (BLOOD PRESSURE) < 140/90: Primary | ICD-10-CM

## 2018-02-15 DIAGNOSIS — I65.22 ASYMPTOMATIC STENOSIS OF LEFT CAROTID ARTERY: ICD-10-CM

## 2018-02-15 PROCEDURE — 99213 OFFICE O/P EST LOW 20 MIN: CPT | Performed by: NURSE PRACTITIONER

## 2018-02-15 NOTE — MR AVS SNAPSHOT
After Visit Summary   2/15/2018    Diann John    MRN: [id number]           Patient Information     Date Of Birth          7/31/1931        Visit Information        Provider Department      2/15/2018 9:20 AM Betsy Donohue APRN CNP [facility]        Today's Diagnoses     Hypertension goal BP (blood pressure) < 140/90    -  1    Asymptomatic stenosis of left carotid artery          Care Instructions    At [facility], we strive to deliver an exceptional experience to you, every time we see you.  If you receive a survey in the mail, please send us back your thoughts. We really do value your feedback.    Based on your medical history, these are the current health maintenance/preventive care services that you are due for (some may have been done at this visit.)  There are no preventive care reminders to display for this patient.      Suggested websites for health information:  Www.[employer] : Up to date and easily searchable information on multiple topics.  Www.medlineplus.gov : medication info, interactive tutorials, watch real surgeries online  Www.familydoctor.org : good info from the Academy of Family Physicians  Www.cdc.gov : public health info, travel advisories, epidemics (H1N1)  Www.aap.org : children's health info, normal development, vaccinations  Www.health[facility].us : [place] dept of health, public health issues in [place], N1N1    Your care team:                            Family Medicine Internal Medicine   MD Ghulam Santos MD Shantel Branch-Fleming, MD Katya Georgiev PA-C Nam Ho, MD Pediatrics   PANDA Hernandez, MD Jaimie Ferrari CNP, MD Deborah Mielke, MD Kim Thein, APRN CNP      Clinic hours: Monday - Thursday 7 am-7 pm; Fridays 7 am-5 pm.   Urgent care: Monday - Friday 11 am-9 pm; Saturday and Sunday 9 am-5 pm.  Pharmacy : Monday -Thursday 8 am-8 pm; Friday  8 am-6 pm; Saturday and Sunday 9 am-5 pm.     Clinic: [phone]   Pharmacy: [phone]    Established High Blood Pressure    High blood pressure (hypertension) is a chronic disease. Often, healthcare providers don t know what causes it. But it can be caused by certain health conditions and medicines.  If you have high blood pressure, you may not have any symptoms. If you do have symptoms, they may include headache, dizziness, changes in your vision, chest pain, and shortness of breath. But even without symptoms, high blood pressure that s not treated raises your risk for heart attack and stroke. High blood pressure is a serious health risk and shouldn t be ignored.  A blood pressure reading is made up of two numbers: a higher number over a lower number. The top number is the systolic pressure. The bottom number is the diastolic pressure. A normal blood pressure is a systolic pressure of  less than 120 over a diastolic pressure of less than 80. You will see your blood pressure readings written together. For example, a person with a systolic pressure of 188 and a diastolic pressure of 78 will have 118/78 written in the medical record.  High blood pressure is when either the top number is 140 or higher, or the bottom number is 90 or higher. This must be the result when taking your blood pressure a number of times. The blood pressures between normal and high are called prehypertension.  Home care  If you have high blood pressure, you should do what is listed below to lower your blood pressure. If you are taking medicines for high blood pressure, these methods may reduce or end your need for medicines in the future.    Begin a weight-loss program if you are overweight.    Cut back on how much salt you get in your diet. Here s how to do this:    Don t eat foods that have a lot of salt. These include olives, pickles, smoked meats, and salted potato chips.    Don t add salt to your food at the table.    Use only  small amounts of salt when cooking.    Start an exercise program. Talk with your healthcare provider about the type of exercise program that would be best for you. It doesn't have to be hard. Even brisk walking for 20 minutes 3 times a week is a good form of exercise.    Don t take medicines that stimulate the heart. This includes many over-the-counter cold and sinus decongestant pills and sprays, as well as diet pills. Check the warnings about hypertension on the label. Before buying any over-the-counter medicines or supplements, always ask the pharmacist about the product's potential interaction with your high blood pressure and your high blood pressure medicines.    Stimulants such as amphetamine or cocaine could be deadly for someone with high blood pressure. Never take these.    Limit how much caffeine you get in your diet. Switch to caffeine-free products.    Stop smoking. If you are a long-time smoker, this can be hard. Talk to your healthcare provider about medicines and nicotine replacement options to help you. Also, enroll in a stop-smoking program to make it more likely that you will quit for good.    Learn how to handle stress. This is an important part of any program to lower blood pressure. Learn about relaxation methods like meditation, yoga, or biofeedback.    If your provider prescribed medicines, take them exactly as directed. Missing doses may cause your blood pressure get out of control.    If you miss a dose or doses, check with your healthcare provider or pharmacist about what to do.    Consider buying an automatic blood pressure machine. Ask your provider for a recommendation. You can get one of these at most pharmacies.     The American Heart Association recommends the following guidelines for home blood pressure monitoring:    Don't smoke or drink coffee for 30 minutes before taking your blood pressure.    Go to the bathroom before the test.    Relax for 5 minutes before taking the  measurement.    Sit with your back supported (don't sit on a couch or soft chair); keep your feet on the floor uncrossed. Place your arm on a solid flat surface (like a table) with the upper part of the arm at heart level. Place the middle of the cuff directly above the eye of the elbow. Check the monitor's instruction manual for an illustration.    Take multiple readings. When you measure, take 2 to 3 readings one minute apart and record all of the results.    Take your blood pressure at the same time every day, or as your healthcare provider recommends.    Record the date, time, and blood pressure reading.    Take the record with you to your next medical appointment. If your blood pressure monitor has a built-in memory, simply take the monitor with you to your next appointment.    Call your provider if you have several high readings. Don't be frightened by a single high blood pressure reading, but if you get several high readings, check in with your healthcare provider.    Note: When blood pressure reaches a systolic (top number) of 180 or higher OR diastolic (bottom number) of 110 or higher, seek emergency medical treatment.  Follow-up care  You will need to see your healthcare provider regularly. This is to check your blood pressure and to make changes to your medicines. Make a follow-up appointment as directed. Bring the record of your home blood pressure readings to the appointment.  When to seek medical advice  Call your healthcare provider right away if any of these occur:    Blood pressure reaches a systolic (upper number) of 180 or higher OR a diastolic (bottom number) of 110 or higher    Chest pain or shortness of breath    Severe headache    Throbbing or rushing sound in the ears    Nosebleed    Sudden severe pain in your belly (abdomen)    Extreme drowsiness, confusion, or fainting    Dizziness or spinning sensation (vertigo)    Weakness of an arm or leg or one side of the face    You have problems  "speaking or seeing   Date Last Reviewed: 2016-2017 [employer]. [address] [place] [address], [place] [address]. All rights reserved. This information is not intended as a substitute for professional medical care. Always follow your healthcare professional's instructions.                Follow-ups after your visit        Who to contact     If you have questions or need follow up information about today's clinic visit or your schedule please contact [facility] directly at [phone].  Normal or non-critical lab and imaging results will be communicated to you by MyChart, letter or phone within 4 business days after the clinic has received the results. If you do not hear from us within 7 days, please contact the clinic through TapSensehart or phone. If you have a critical or abnormal lab result, we will notify you by phone as soon as possible.  Submit refill requests through RelayRides or call your pharmacy and they will forward the refill request to us. Please allow 3 business days for your refill to be completed.          Additional Information About Your Visit        [employer] Information     RelayRides lets you send messages to your doctor, view your test results, renew your prescriptions, schedule appointments and more. To sign up, go to [email or web address] . Click on \"Log in\" on the left side of the screen, which will take you to the Welcome page. Then click on \"Sign up Now\" on the right side of the page.     You will be asked to enter the access code listed below, as well as some personal information. Please follow the directions to create your username and password.     Your access code is: [email or web address]  Expires: 2018  9:36 AM     Your access code will  in 90 days. If you need help or a new code, please call your [facility] or [phone].        Care EveryWhere ID     This is your Care EveryWhere ID. This could be used by other organizations to access your " "[place] medical records  [id number]        Your Vitals Were     Pulse Temperature Height Pulse Oximetry BMI (Body Mass Index)       83 96.7  F (35.9  C) (Oral) 5' 1\" (1.549 m) 98% 21.54 kg/m2        Blood Pressure from Last 3 Encounters:   02/15/18 128/74   08/28/17 124/68   08/14/17 149/72    Weight from Last 3 Encounters:   02/15/18 114 lb (51.7 kg)   08/28/17 114 lb (51.7 kg)   08/14/17 115 lb (52.2 kg)              Today, you had the following     No orders found for display         Today's Medication Changes          These changes are accurate as of 2/15/18  9:36 AM.  If you have any questions, ask your nurse or doctor.               Start taking these medicines.        Dose/Directions    order for DME   Used for:  Hypertension goal BP (blood pressure) < 140/90   Started by:  Betsy Donohue APRN CNP        Equipment being ordered: BP cuff/machine   Quantity:  1 Device   Refills:  0            Where to get your medicines      Some of these will need a paper prescription and others can be bought over the counter.  Ask your nurse if you have questions.     Bring a paper prescription for each of these medications     order for DME                Primary Care Provider Office Phone # Fax #    Esmer Krysten Finley [phone]       [address] [place]  [facility] [id number]        Equal Access to Services     [facility] Center[facility] AH: Hadii reji tsang hadasho Soomaali, waaxda luqadaha, qaybta kaalmada adeegyada, fabian torres . So [facility] [phone].    ATENCIÓN: Si habla español, tiene a moreno disposición servicios gratuitos de asistencia lingüística. Llame al [phone].    We comply with applicable federal civil rights laws and [place] laws. We do not discriminate on the basis of race, color, national origin, age, disability, sex, sexual orientation, or gender identity.            Thank you!     Thank you for choosing [facility]  for your care. Our goal is always " to provide you with excellent care. Hearing back from our patients is one way we can continue to improve our services. Please take a few minutes to complete the written survey that you may receive in the mail after your visit with us. Thank you!             Your Updated Medication List - Protect others around you: Learn how to safely use, store and throw away your medicines at www.disposemymeds.org.          This list is accurate as of 2/15/18  9:36 AM.  Always use your most recent med list.                   Brand Name Dispense Instructions for use Diagnosis    amLODIPine 2.5 MG tablet    NORVASC    90 tablet    Take 1 tablet (2.5 mg) by mouth daily    Essential hypertension with goal blood pressure less than 140/90       ascorbic acid 1000 MG Tabs    vitamin C     Take 1,000 mg by mouth daily        aspirin 81 MG tablet     30 tablet    Take 1 tablet (81 mg) by mouth daily        atorvastatin 20 MG tablet    LIPITOR    90 tablet    Take 1 tablet (20 mg) by mouth daily    Carotid artery stenosis, asymptomatic, left       CALCIUM + D PO      Take 1 tablet by mouth daily.        erythromycin ophthalmic ointment    ROMYCIN    1 Tube    Place 1 Application into both eyes At Bedtime Apply small (1/4 inch) strip to affected eye(s)    Squamous blepharitis, unspecified laterality       fluticasone 50 MCG/ACT spray    FLONASE    1 Bottle    Spray 1-2 sprays into both nostrils daily    Seasonal allergic rhinitis, unspecified allergic rhinitis trigger       loteprednol 0.2 % Susp ophthalmic susp    ALREX    1 Bottle    Place 1 drop into both eyes 2 times daily as needed    Blepharitis, unspecified laterality       MULTI-VITAMIN PO      Take 1 tablet by mouth daily.        omega-3 fatty acids 1200 MG capsule      Take 1 capsule by mouth daily        order for DME     1 Device    Equipment being ordered: BP cuff/machine    Hypertension goal BP (blood pressure) < 140/90       VAQTA 50 UNIT/ML injection   Generic drug:  hepatitis  A vaccine      Reported on 4/4/2017

## 2018-02-15 NOTE — PROGRESS NOTES
SUBJECTIVE:   Diann John is a 86 year old female who presents to clinic today for the following health issues:    Hypertension Follow-up      Outpatient blood pressures are being checked at home.  Results are 142/64-78.    Low Salt Diet: low salt      Amount of exercise or physical activity: 2-3 days/week for an average of 45-60 minutes    Problems taking medications regularly: No    Medication side effects: none    Diet: low salt    Patient has a BP cuff at home but it is unweildy and difficult to put on and she is not sure how accurate it is.    Problem list and histories reviewed & adjusted, as indicated.  Additional history: as documented    Patient Active Problem List   Diagnosis     Macular pucker, right eye     Pseudophakia OU     Blepharitis     Hypertension goal BP (blood pressure) < 140/90     Seasonal allergies     Carotid artery stenosis, asymptomatic     PVD (posterior vitreous detachment) OU     Advanced directives, counseling/discussion     ASVD (arteriosclerotic vascular disease)     Hyperlipidemia LDL goal <70     Chronic rhinitis     Pulmonary nodules     Past Surgical History:   Procedure Laterality Date     APPENDECTOMY       C RAD RESEC TONSIL/PILLARS      Adeniods     CATARACT IOL, RT/LT Right      HAND SURGERY       NOSE SURGERY         Social History   Substance Use Topics     Smoking status: Former Smoker     Quit date: 1/1/1970     Smokeless tobacco: Never Used     Alcohol use No     Family History   Problem Relation Age of Onset     CANCER Father      Glaucoma Father      CANCER Mother      Hypertension Mother      Hypertension Sister      CEREBROVASCULAR DISEASE Sister      Thyroid Disease No family hx of      Macular Degeneration No family hx of          Current Outpatient Prescriptions   Medication Sig Dispense Refill     order for DME Equipment being ordered: BP cuff/machine 1 Device 0     erythromycin (ROMYCIN) ophthalmic ointment Place 1 Application into both eyes At Bedtime  "Apply small (1/4 inch) strip to affected eye(s) 1 Tube 11     amLODIPine (NORVASC) 2.5 MG tablet Take 1 tablet (2.5 mg) by mouth daily 90 tablet 3     atorvastatin (LIPITOR) 20 MG tablet Take 1 tablet (20 mg) by mouth daily 90 tablet 3     fluticasone (FLONASE) 50 MCG/ACT spray Spray 1-2 sprays into both nostrils daily 1 Bottle 11     VAQTA 50 UNIT/ML injection Reported on 4/4/2017       ascorbic acid (VITAMIN C) 1000 MG TABS Take 1,000 mg by mouth daily       loteprednol (ALREX) 0.2 % SUSP Place 1 drop into both eyes 2 times daily as needed 1 Bottle 1     aspirin 81 MG tablet Take 1 tablet (81 mg) by mouth daily 30 tablet      omega-3 fatty acids (FISH OIL) 1200 MG capsule Take 1 capsule by mouth daily       Multiple Vitamin (MULTI-VITAMIN PO) Take 1 tablet by mouth daily.       Calcium-Vitamin D (CALCIUM + D PO) Take 1 tablet by mouth daily.       BP Readings from Last 3 Encounters:   02/15/18 128/74   08/28/17 124/68   08/14/17 149/72    Wt Readings from Last 3 Encounters:   02/15/18 114 lb (51.7 kg)   08/28/17 114 lb (51.7 kg)   08/14/17 115 lb (52.2 kg)                    Reviewed and updated as needed this visit by clinical staff       Reviewed and updated as needed this visit by Provider         ROS:  Constitutional, HEENT, cardiovascular, pulmonary, gi and gu systems are negative, except as otherwise noted.    OBJECTIVE:     /74 (BP Location: Left arm, Patient Position: Chair, Cuff Size: Adult Regular)  Pulse 83  Temp 96.7  F (35.9  C) (Oral)  Ht 5' 1\" (1.549 m)  Wt 114 lb (51.7 kg)  SpO2 98%  BMI 21.54 kg/m2  Body mass index is 21.54 kg/(m^2).  GENERAL: healthy, alert and no distress  EYES: Eyes grossly normal to inspection, PERRL and conjunctivae and sclerae normal  HENT: ear canals and TM's normal, nose and mouth without ulcers or lesions  NECK: no adenopathy, no asymmetry, masses, or scars and thyroid normal to palpation  RESP: lungs clear to auscultation - no rales, rhonchi or wheezes  CV: " "regular rate and rhythm, normal S1 S2, no S3 or S4, no murmur, click or rub, no peripheral edema and peripheral pulses strong  ABDOMEN: soft, nontender, no hepatosplenomegaly, no masses and bowel sounds normal  MS: no gross musculoskeletal defects noted, no edema  SKIN: no suspicious lesions or rashes  NEURO: Normal strength and tone, mentation intact and speech normal  PSYCH: mentation appears normal, affect normal/bright    Diagnostic Test Results:  none     ASSESSMENT/PLAN:           BMI:   Estimated body mass index is 21.54 kg/(m^2) as calculated from the following:    Height as of this encounter: 5' 1\" (1.549 m).    Weight as of this encounter: 114 lb (51.7 kg).         1. Hypertension goal BP (blood pressure) < 140/90  BP well controlled, DMe for new BP cuff that she can manage at home.  - order for DME; Equipment being ordered: BP cuff/machine  Dispense: 1 Device; Refill: 0    2. Asymptomatic stenosis of left carotid artery  Stable.      See Patient Instructions    YOBANY Harrison [facility]  "

## 2018-02-15 NOTE — PATIENT INSTRUCTIONS
At [facility], we strive to deliver an exceptional experience to you, every time we see you.  If you receive a survey in the mail, please send us back your thoughts. We really do value your feedback.    Based on your medical history, these are the current health maintenance/preventive care services that you are due for (some may have been done at this visit.)  There are no preventive care reminders to display for this patient.      Suggested websites for health information:  [email or web address] : Up to date and easily searchable information on multiple topics.  Www.medlineplus.gov : medication info, interactive tutorials, watch real surgeries online  Www.familydoctor.org : good info from the Academy of Family Physicians  Www.cdc.gov : public health info, travel advisories, epidemics (H1N1)  Www.aap.org : children's health info, normal development, vaccinations  Www.[facility].us : [place] dept of health, public health issues in [place], N1N1    Your care team:                            Family Medicine Internal Medicine   MD Ghulam Santos MD Shantel Branch-Fleming, MD Katya Georgiev PA-C Nam Ho, MD Pediatrics   PANDA Hernandez, YVETTE Prajapati APRLAM CNP   MD Jaimie Stockton MD Deborah Mielke, MD Kim Thein, APRLAM [facility]      Clinic hours: Monday - Thursday 7 am-7 pm; Fridays 7 am-5 pm.   Urgent care: Monday - Friday 11 am-9 pm; Saturday and Sunday 9 am-5 pm.  Pharmacy : Monday -Thursday 8 am-8 pm; Friday 8 am-6 pm; Saturday and Sunday 9 am-5 pm.     Clinic: [phone]   Pharmacy: [phone]    Established High Blood Pressure    High blood pressure (hypertension) is a chronic disease. Often, healthcare providers don t know what causes it. But it can be caused by certain health conditions and medicines.  If you have high blood pressure, you may not have any symptoms. If you do have symptoms, they may include headache, dizziness,  changes in your vision, chest pain, and shortness of breath. But even without symptoms, high blood pressure that s not treated raises your risk for heart attack and stroke. High blood pressure is a serious health risk and shouldn t be ignored.  A blood pressure reading is made up of two numbers: a higher number over a lower number. The top number is the systolic pressure. The bottom number is the diastolic pressure. A normal blood pressure is a systolic pressure of  less than 120 over a diastolic pressure of less than 80. You will see your blood pressure readings written together. For example, a person with a systolic pressure of 188 and a diastolic pressure of 78 will have 118/78 written in the medical record.  High blood pressure is when either the top number is 140 or higher, or the bottom number is 90 or higher. This must be the result when taking your blood pressure a number of times. The blood pressures between normal and high are called prehypertension.  Home care  If you have high blood pressure, you should do what is listed below to lower your blood pressure. If you are taking medicines for high blood pressure, these methods may reduce or end your need for medicines in the future.    Begin a weight-loss program if you are overweight.    Cut back on how much salt you get in your diet. Here s how to do this:    Don t eat foods that have a lot of salt. These include olives, pickles, smoked meats, and salted potato chips.    Don t add salt to your food at the table.    Use only small amounts of salt when cooking.    Start an exercise program. Talk with your healthcare provider about the type of exercise program that would be best for you. It doesn't have to be hard. Even brisk walking for 20 minutes 3 times a week is a good form of exercise.    Don t take medicines that stimulate the heart. This includes many over-the-counter cold and sinus decongestant pills and sprays, as well as diet pills. Check the  warnings about hypertension on the label. Before buying any over-the-counter medicines or supplements, always ask the pharmacist about the product's potential interaction with your high blood pressure and your high blood pressure medicines.    Stimulants such as amphetamine or cocaine could be deadly for someone with high blood pressure. Never take these.    Limit how much caffeine you get in your diet. Switch to caffeine-free products.    Stop smoking. If you are a long-time smoker, this can be hard. Talk to your healthcare provider about medicines and nicotine replacement options to help you. Also, enroll in a stop-smoking program to make it more likely that you will quit for good.    Learn how to handle stress. This is an important part of any program to lower blood pressure. Learn about relaxation methods like meditation, yoga, or biofeedback.    If your provider prescribed medicines, take them exactly as directed. Missing doses may cause your blood pressure get out of control.    If you miss a dose or doses, check with your healthcare provider or pharmacist about what to do.    Consider buying an automatic blood pressure machine. Ask your provider for a recommendation. You can get one of these at most pharmacies.     The American Heart Association recommends the following guidelines for home blood pressure monitoring:    Don't smoke or drink coffee for 30 minutes before taking your blood pressure.    Go to the bathroom before the test.    Relax for 5 minutes before taking the measurement.    Sit with your back supported (don't sit on a couch or soft chair); keep your feet on the floor uncrossed. Place your arm on a solid flat surface (like a table) with the upper part of the arm at heart level. Place the middle of the cuff directly above the eye of the elbow. Check the monitor's instruction manual for an illustration.    Take multiple readings. When you measure, take 2 to 3 readings one minute apart and record  all of the results.    Take your blood pressure at the same time every day, or as your healthcare provider recommends.    Record the date, time, and blood pressure reading.    Take the record with you to your next medical appointment. If your blood pressure monitor has a built-in memory, simply take the monitor with you to your next appointment.    Call your provider if you have several high readings. Don't be frightened by a single high blood pressure reading, but if you get several high readings, check in with your healthcare provider.    Note: When blood pressure reaches a systolic (top number) of 180 or higher OR diastolic (bottom number) of 110 or higher, seek emergency medical treatment.  Follow-up care  You will need to see your healthcare provider regularly. This is to check your blood pressure and to make changes to your medicines. Make a follow-up appointment as directed. Bring the record of your home blood pressure readings to the appointment.  When to seek medical advice  Call your healthcare provider right away if any of these occur:    Blood pressure reaches a systolic (upper number) of 180 or higher OR a diastolic (bottom number) of 110 or higher    Chest pain or shortness of breath    Severe headache    Throbbing or rushing sound in the ears    Nosebleed    Sudden severe pain in your belly (abdomen)    Extreme drowsiness, confusion, or fainting    Dizziness or spinning sensation (vertigo)    Weakness of an arm or leg or one side of the face    You have problems speaking or seeing   Date Last Reviewed: 12/1/2016 2000-2017 [employer]. [address] [place] [address], [place] [address]. All rights reserved. This information is not intended as a substitute for professional medical care. Always follow your healthcare professional's instructions.

## 2018-05-10 ENCOUNTER — OFFICE VISIT (OUTPATIENT)
Dept: FAMILY MEDICINE | Facility: CLINIC | Age: 83
End: 2018-05-10
Payer: COMMERCIAL

## 2018-05-10 VITALS
HEIGHT: 61 IN | DIASTOLIC BLOOD PRESSURE: 71 MMHG | WEIGHT: 116 LBS | SYSTOLIC BLOOD PRESSURE: 127 MMHG | BODY MASS INDEX: 21.9 KG/M2 | RESPIRATION RATE: 16 BRPM | TEMPERATURE: 97.1 F | HEART RATE: 85 BPM | OXYGEN SATURATION: 95 %

## 2018-05-10 DIAGNOSIS — R30.0 DYSURIA: ICD-10-CM

## 2018-05-10 DIAGNOSIS — I10 HYPERTENSION GOAL BP (BLOOD PRESSURE) < 140/90: ICD-10-CM

## 2018-05-10 DIAGNOSIS — N30.00 ACUTE CYSTITIS WITHOUT HEMATURIA: Primary | ICD-10-CM

## 2018-05-10 DIAGNOSIS — E78.5 HYPERLIPIDEMIA LDL GOAL <70: ICD-10-CM

## 2018-05-10 DIAGNOSIS — R82.90 NONSPECIFIC FINDING ON EXAMINATION OF URINE: ICD-10-CM

## 2018-05-10 DIAGNOSIS — J30.2 CHRONIC SEASONAL ALLERGIC RHINITIS, UNSPECIFIED TRIGGER: ICD-10-CM

## 2018-05-10 LAB
ALBUMIN UR-MCNC: NEGATIVE MG/DL
APPEARANCE UR: ABNORMAL
BACTERIA #/AREA URNS HPF: ABNORMAL /HPF
BILIRUB UR QL STRIP: NEGATIVE
COLOR UR AUTO: YELLOW
GLUCOSE UR STRIP-MCNC: NEGATIVE MG/DL
HGB UR QL STRIP: ABNORMAL
KETONES UR STRIP-MCNC: ABNORMAL MG/DL
LEUKOCYTE ESTERASE UR QL STRIP: ABNORMAL
NITRATE UR QL: NEGATIVE
NON-SQ EPI CELLS #/AREA URNS LPF: ABNORMAL /LPF
PH UR STRIP: 5.5 PH (ref 5–7)
RBC #/AREA URNS AUTO: ABNORMAL /HPF
SOURCE: ABNORMAL
SP GR UR STRIP: 1.02 (ref 1–1.03)
UROBILINOGEN UR STRIP-ACNC: 0.2 EU/DL (ref 0.2–1)
WBC #/AREA URNS AUTO: >100 /HPF

## 2018-05-10 PROCEDURE — 99214 OFFICE O/P EST MOD 30 MIN: CPT | Performed by: FAMILY MEDICINE

## 2018-05-10 PROCEDURE — 87086 URINE CULTURE/COLONY COUNT: CPT | Performed by: FAMILY MEDICINE

## 2018-05-10 PROCEDURE — 81001 URINALYSIS AUTO W/SCOPE: CPT | Performed by: FAMILY MEDICINE

## 2018-05-10 PROCEDURE — 87088 URINE BACTERIA CULTURE: CPT | Performed by: FAMILY MEDICINE

## 2018-05-10 PROCEDURE — 87186 SC STD MICRODIL/AGAR DIL: CPT | Performed by: FAMILY MEDICINE

## 2018-05-10 RX ORDER — SULFAMETHOXAZOLE/TRIMETHOPRIM 800-160 MG
1 TABLET ORAL 2 TIMES DAILY
Qty: 10 TABLET | Refills: 0 | Status: SHIPPED | OUTPATIENT
Start: 2018-05-10 | End: 2018-05-15

## 2018-05-10 ASSESSMENT — PAIN SCALES - GENERAL: PAINLEVEL: NO PAIN (0)

## 2018-05-10 NOTE — MR AVS SNAPSHOT
After Visit Summary   5/10/2018    Diann John    MRN: [id number]           Patient Information     Date Of Birth          7/31/1931        Visit Information        Provider Department      5/10/2018 3:00 PM Esmer Finley MD [facility]        Today's Diagnoses     Dysuria    -  1    Hypertension goal BP (blood pressure) < 140/90        Acute cystitis without hematuria          Care Instructions    At [facility], we strive to deliver an exceptional experience to you, every time we see you.  If you receive a survey in the mail, please send us back your thoughts. We really do value your feedback.    Based on your medical history, these are the current health maintenance/preventive care services that you are due for (some may have been done at this visit.)  Health Maintenance Due   Topic Date Due     MICROALBUMIN Q1 YEAR  06/01/2018         Suggested websites for health information:  Www.Curbsy : Up to date and easily searchable information on multiple topics.  Www.IPM Safety Services.gov : medication info, interactive tutorials, watch real surgeries online  Www.familydoctor.org : good info from the Academy of Family Physicians  Www.cdc.gov : public health info, travel advisories, epidemics (H1N1)  Www.aap.org : children's health info, normal development, vaccinations  Www.health.state.[place].us : [place] dept of health, public health issues in [place], N1N1    Your care team:                            Family Medicine Internal Medicine   MD Ghulam Santos MD Shantel Branch-Fleming, MD Katya Georgiev PA-C Nam Ho, MD Pediatrics   PANDA Hernandez, YVETTE Prajapati APRN MD Jaimie Noguera MD Deborah Mielke, MD Kim Thein, APRLAM CNP      Clinic hours: Monday - Thursday 7 am-7 pm; Fridays 7 am-5 pm.   Urgent care: Monday - Friday 11 am-9 pm; Saturday and Sunday 9 am-5 pm.  Pharmacy : Monday -Thursday 8 am-8  pm; Friday 8 am-6 pm; Saturday and Sunday 9 am-5 pm.     Clinic: [phone]   Pharmacy: [phone]    Urinary Tract Infections in Women- real lemonade once a day seems to help prevent urinary tract infection in women.     Urinary tract infections (UTIs) are most often caused by bacteria. These bacteria enter the urinary tract. The bacteria may come from outside the body. Or they may travel from the skin outside the rectum or vagina into the urethra. Female anatomy makes it easy for bacteria from the bowel to enter a woman s urinary tract, which is the most common source of UTI. This means women develop UTIs more often than men. Pain in or around the urinary tract is a common UTI symptom. But the only way to know for sure if you have a UTI for the healthcare provider to test your urine. The two tests that may be done are the urinalysis and urine culture.  Types of UTIs    Cystitis. A bladder infection (cystitis) is the most common UTI in women. You may have urgent or frequent urination. You may also have pain, burning when you urinate, and bloody urine.    Urethritis. This is an inflamed urethra, which is the tube that carries urine from the bladder to outside the body. You may have lower stomach or back pain. You may also have urgent or frequent urination.    Pyelonephritis. This is a kidney infection. If not treated, it can be serious and damage your kidneys. In severe cases, you may need to stay in the hospital. You may have a fever and lower back pain.  Medicines to treat a UTI  Most UTIs are treated with antibiotics. These kill the bacteria. The length of time you need to take them depends on the type of infection. It may be as short as 3 days. If you have repeated UTIs, you may need a low-dose antibiotic for several months. Take antibiotics exactly as directed. Don t stop taking them until all of the medicine is gone. If you stop taking the antibiotic too soon, the infection may not go away. You may  also develop a resistance to the antibiotic. This can make it much harder to treat.  Lifestyle changes to treat and prevent UTIs  The lifestyle changes below will help get rid of your UTI. They may also help prevent future UTIs.    Drink plenty of fluids. This includes water, juice, or other caffeine-free drinks. Fluids help flush bacteria out of your body.    Empty your bladder. Always empty your bladder when you feel the urge to urinate. And always urinate before going to sleep. Urine that stays in your bladder can lead to infection. Try to urinate before and after sex as well.    Practice good personal hygiene. Wipe yourself from front to back after using the toilet. This helps keep bacteria from getting into the urethra.    Use condoms during sex. These help prevent UTIs caused by sexually transmitted bacteria. Also don't use spermicides during sex. These can increase the risk for UTIs. Choose other forms of birth control instead. For women who tend to get UTIs after sex, a low-dose of a preventive antibiotic may be used. Be sure to discuss this option with your healthcare provider.    Follow up with your healthcare provider as directed. He or she may test to make sure the infection has cleared. If needed, more treatment may be started.  Date Last Reviewed: 1/1/2017 2000-2017 [employer]. [address] [place] [address], [place] [address]. All rights reserved. This information is not intended as a substitute for professional medical care. Always follow your healthcare professional's instructions.                Follow-ups after your visit        Follow-up notes from your care team     Return in about 3 months (around 8/10/2018) for Physical Exam, Lab Work, medication follow up.      Who to contact     If you have questions or need follow up information about today's clinic visit or your schedule please contact [facility] directly at [phone].  Normal or non-critical lab and  "imaging results will be communicated to you by MyChart, letter or phone within 4 business days after the clinic has received the results. If you do not hear from us within 7 days, please contact the clinic through TelePharm or phone. If you have a critical or abnormal lab result, we will notify you by phone as soon as possible.  Submit refill requests through TelePharm or call your pharmacy and they will forward the refill request to us. Please allow 3 business days for your refill to be completed.          Additional Information About Your Visit        [employer] Information     TelePharm lets you send messages to your doctor, view your test results, renew your prescriptions, schedule appointments and more. To sign up, go to [email or web address] . Click on \"Log in\" on the left side of the screen, which will take you to the Welcome page. Then click on \"Sign up Now\" on the right side of the page.     You will be asked to enter the access code listed below, as well as some personal information. Please follow the directions to create your username and password.     Your access code is: [email or web address]  Expires: 2018 10:36 AM     Your access code will  in 90 days. If you need help or a new code, please call your [facility] clinic or [phone].        Care EveryWhere ID     This is your Care EveryWhere ID. This could be used by other organizations to access your [facility] medical records  [id number]        Your Vitals Were     Pulse Temperature Respirations Height Last Period Pulse Oximetry    85 97.1  F (36.2  C) (Oral) 16 5' 1\" (1.549 m) 05/10/1978 (Approximate) 95%    BMI (Body Mass Index)                   21.92 kg/m2            Blood Pressure from Last 3 Encounters:   05/10/18 127/71   02/15/18 128/74   17 124/68    Weight from Last 3 Encounters:   05/10/18 116 lb (52.6 kg)   02/15/18 114 lb (51.7 kg)   17 114 lb (51.7 kg)              We Performed the Following     UA reflex to Microscopic and " Culture     Urine Microscopic          Today's Medication Changes          These changes are accurate as of 5/10/18  3:43 PM.  If you have any questions, ask your nurse or doctor.               Start taking these medicines.        Dose/Directions    sulfamethoxazole-trimethoprim 800-160 MG per tablet   Commonly known as:  BACTRIM DS/SEPTRA DS   Used for:  Acute cystitis without hematuria   Started by:  Esmer Finley MD        Dose:  1 tablet   Take 1 tablet by mouth 2 times daily for 5 days   Quantity:  10 tablet   Refills:  0            Where to get your medicines      [place] [facility] [place] [address]  [address], [facility] [address]     Phone:  [phone]     sulfamethoxazole-trimethoprim 800-160 MG per tablet                Primary Care Provider Office Phone # Fax #    Esmer Finley [phone]       [address]  [facility] [address]        Equal Access to Services     [facility]: Hadii aad ku hadasho Soomaali, waaxda luqadaha, qaybta kaalmada adeegyada, waxay idiin hayaan adeeg kharash lavioleta . So [facility] [phone].    ATENCIÓN: Si habla español, tiene a moreno disposición servicios gratuitos de asistencia lingüística. Ll[facility] [phone].    We comply with applicable federal civil rights laws and [place] laws. We do not discriminate on the basis of race, color, national origin, age, disability, sex, sexual orientation, or gender identity.            Thank you!     Thank you for choosing [facility]  for your care. Our goal is always to provide you with excellent care. Hearing back from our patients is one way we can continue to improve our services. Please take a few minutes to complete the written survey that you may receive in the mail after your visit with us. Thank you!             Your Updated Medication List - Protect others around you: Learn how to safely use, store  and throw away your medicines at www.disposemymeds.org.          This list is accurate as of 5/10/18  3:43 PM.  Always use your most recent med list.                   Brand Name Dispense Instructions for use Diagnosis    amLODIPine 2.5 MG tablet    NORVASC    90 tablet    Take 1 tablet (2.5 mg) by mouth daily    Essential hypertension with goal blood pressure less than 140/90       ascorbic acid 1000 MG Tabs    vitamin C     Take 1,000 mg by mouth daily        aspirin 81 MG tablet     30 tablet    Take 1 tablet (81 mg) by mouth daily        atorvastatin 20 MG tablet    LIPITOR    90 tablet    Take 1 tablet (20 mg) by mouth daily    Carotid artery stenosis, asymptomatic, left       CALCIUM + D PO      Take 1 tablet by mouth daily.        erythromycin ophthalmic ointment    ROMYCIN    1 Tube    Place 1 Application into both eyes At Bedtime Apply small (1/4 inch) strip to affected eye(s)    Squamous blepharitis, unspecified laterality       fluticasone 50 MCG/ACT spray    FLONASE    1 Bottle    Spray 1-2 sprays into both nostrils daily    Seasonal allergic rhinitis, unspecified allergic rhinitis trigger       loteprednol 0.2 % Susp ophthalmic susp    ALREX    1 Bottle    Place 1 drop into both eyes 2 times daily as needed    Blepharitis, unspecified laterality       MULTI-VITAMIN PO      Take 1 tablet by mouth daily.        omega-3 fatty acids 1200 MG capsule      Take 1 capsule by mouth daily        order for DME     1 Device    Equipment being ordered: BP cuff/machine    Hypertension goal BP (blood pressure) < 140/90       sulfamethoxazole-trimethoprim 800-160 MG per tablet    BACTRIM DS/SEPTRA DS    10 tablet    Take 1 tablet by mouth 2 times daily for 5 days    Acute cystitis without hematuria       VAQTA 50 UNIT/ML injection   Generic drug:  hepatitis A vaccine      Reported on 4/4/2017

## 2018-05-10 NOTE — PATIENT INSTRUCTIONS
At [facility], we strive to deliver an exceptional experience to you, every time we see you.  If you receive a survey in the mail, please send us back your thoughts. We really do value your feedback.    Based on your medical history, these are the current health maintenance/preventive care services that you are due for (some may have been done at this visit.)  Health Maintenance Due   Topic Date Due     MICROALBUMIN Q1 YEAR  06/01/2018         Suggested websites for health information:  Www.VenueSpot.org : Up to date and easily searchable information on multiple topics.  Www.Classkick.gov : medication info, interactive tutorials, watch real surgeries online  Www.familydoctor.org : good info from the Academy of Family Physicians  Www.cdc.gov : public health info, travel advisories, epidemics (H1N1)  Www.aap.org : children's health info, normal development, vaccinations  Www.health.[facility].[place].us : [place] dept of health, public health issues in [place], N1N1    Your care team:                            Family Medicine Internal Medicine   MD Ghulam Santos MD Shantel Branch-Fleming, MD Katya Georgiev PA-C Nam Ho, MD Pediatrics   PANDA Hernandez, CNP Lily Prajapati APRN CNP   MD Jaimie Stockton MD Deborah Mielke, MD Kim Thein, APRN [facility]      Clinic hours: Monday - Thursday 7 am-7 pm; Fridays 7 am-5 pm.   Urgent care: Monday - Friday 11 am-9 pm; Saturday and Sunday 9 am-5 pm.  Pharmacy : Monday -Thursday 8 am-8 pm; Friday 8 am-6 pm; Saturday and Sunday 9 am-5 pm.     Clinic: [phone]   Pharmacy: [phone]    Urinary Tract Infections in Women- real lemonade once a day seems to help prevent urinary tract infection in women.     Urinary tract infections (UTIs) are most often caused by bacteria. These bacteria enter the urinary tract. The bacteria may come from outside the body. Or they may travel from the skin outside the rectum or vagina  into the urethra. Female anatomy makes it easy for bacteria from the bowel to enter a woman s urinary tract, which is the most common source of UTI. This means women develop UTIs more often than men. Pain in or around the urinary tract is a common UTI symptom. But the only way to know for sure if you have a UTI for the healthcare provider to test your urine. The two tests that may be done are the urinalysis and urine culture.  Types of UTIs    Cystitis. A bladder infection (cystitis) is the most common UTI in women. You may have urgent or frequent urination. You may also have pain, burning when you urinate, and bloody urine.    Urethritis. This is an inflamed urethra, which is the tube that carries urine from the bladder to outside the body. You may have lower stomach or back pain. You may also have urgent or frequent urination.    Pyelonephritis. This is a kidney infection. If not treated, it can be serious and damage your kidneys. In severe cases, you may need to stay in the hospital. You may have a fever and lower back pain.  Medicines to treat a UTI  Most UTIs are treated with antibiotics. These kill the bacteria. The length of time you need to take them depends on the type of infection. It may be as short as 3 days. If you have repeated UTIs, you may need a low-dose antibiotic for several months. Take antibiotics exactly as directed. Don t stop taking them until all of the medicine is gone. If you stop taking the antibiotic too soon, the infection may not go away. You may also develop a resistance to the antibiotic. This can make it much harder to treat.  Lifestyle changes to treat and prevent UTIs  The lifestyle changes below will help get rid of your UTI. They may also help prevent future UTIs.    Drink plenty of fluids. This includes water, juice, or other caffeine-free drinks. Fluids help flush bacteria out of your body.    Empty your bladder. Always empty your bladder when you feel the urge to urinate. And  always urinate before going to sleep. Urine that stays in your bladder can lead to infection. Try to urinate before and after sex as well.    Practice good personal hygiene. Wipe yourself from front to back after using the toilet. This helps keep bacteria from getting into the urethra.    Use condoms during sex. These help prevent UTIs caused by sexually transmitted bacteria. Also don't use spermicides during sex. These can increase the risk for UTIs. Choose other forms of birth control instead. For women who tend to get UTIs after sex, a low-dose of a preventive antibiotic may be used. Be sure to discuss this option with your healthcare provider.    Follow up with your healthcare provider as directed. He or she may test to make sure the infection has cleared. If needed, more treatment may be started.  Date Last Reviewed: 1/1/2017 2000-2017 The Related Content Database (RCDb). [address] [place] [address]. All rights reserved. This information is not intended as a substitute for professional medical care. Always follow your healthcare professional's instructions.

## 2018-05-10 NOTE — LETTER
May 14, 2018      Diann John  [address] [facility] [place] [address]        Dear Diann,    Your test results are attached. I am happy to let you know that they are stable and your medications can stay the same.    The urine showed an infection with Klebsiella and the medication should work to make this go away. Call if you have any more symptoms. You do not need any follow up testing.    Please call me if you have any questions about these test results or about your care.    Sincerely,      Esmer Finley MD/koki    Resulted Orders   UA reflex to Microscopic and Culture   Result Value Ref Range    Color Urine Yellow     Appearance Urine Slightly Cloudy     Glucose Urine Negative NEG^Negative mg/dL    Bilirubin Urine Negative NEG^Negative    Ketones Urine Trace (A) NEG^Negative mg/dL    Specific Gravity Urine 1.020 1.003 - 1.035    Blood Urine Trace (A) NEG^Negative    pH Urine 5.5 5.0 - 7.0 pH    Protein Albumin Urine Negative NEG^Negative mg/dL    Urobilinogen Urine 0.2 0.2 - 1.0 EU/dL    Nitrite Urine Negative NEG^Negative    Leukocyte Esterase Urine Moderate (A) NEG^Negative    Source Midstream Urine    Urine Microscopic   Result Value Ref Range    WBC Urine >100 (A) OTO5^0 - 5 /HPF    RBC Urine 2-5 (A) OTO2^O - 2 /HPF    Squamous Epithelial /LPF Urine Few FEW^Few /LPF    Bacteria Urine Few (A) NEG^Negative /HPF   Urine Culture Aerobic Bacterial   Result Value Ref Range    Specimen Description Midstream Urine     Culture Micro >100,000 colonies/mL  Klebsiella pneumoniae   (A)

## 2018-05-10 NOTE — PROGRESS NOTES
SUBJECTIVE:   Diann John is a 86 year old female who presents to clinic today for the following health issues:    URINARY TRACT SYMPTOMS  Onset: 2-3week. Patient with concerns noticed symptoms since started blood pressure medication and thinks may be a medication problem    Description:   Painful urination (Dysuria): no  Blood in urine (Hematuria): no   Delay in urine (Hesitency): no     Intensity: moderate    Progression of Symptoms:  worsening    Accompanying Signs & Symptoms:  Fever/chills: no but sweating in the morning  Flank pain no   Nausea and vomiting: no   Any vaginal symptoms: burning, frequency, tired  Abdominal/Pelvic Pain: YES- discomfort    History:   History of frequent UTI's: no   History of kidney stones: no   Sexually Active: no   Possibility of pregnancy: No    Precipitating factors:   None    Therapies Tried and outcome: Cranberry juice prn (contraindicated in Coumadin patients) last 2 days not helping    Hyperlipidemia Follow-Up      Rate your low fat/cholesterol diet?: good    Taking statin?  Yes, no muscle aches from statin    Other lipid medications/supplements?:  none    Hypertension Follow-up      Outpatient blood pressures are not being checked.    Low Salt Diet: no added salt    Increased mucous with weather change and when eating yogurt. Cough is better now.     Problem list and histories reviewed & adjusted, as indicated.  Additional history: as documented    Patient Active Problem List   Diagnosis     Macular pucker, right eye     Pseudophakia OU     Blepharitis     Hypertension goal BP (blood pressure) < 140/90     Seasonal allergies     Carotid artery stenosis, asymptomatic     PVD (posterior vitreous detachment) OU     Advanced directives, counseling/discussion     ASVD (arteriosclerotic vascular disease)     Hyperlipidemia LDL goal <70     Chronic rhinitis     Pulmonary nodules     Past Surgical History:   Procedure Laterality Date     APPENDECTOMY       C RAD RESEC  TONSIL/PILLARS      Adeniods     CATARACT IOL, RT/LT Right      HAND SURGERY       NOSE SURGERY         Social History   Substance Use Topics     Smoking status: Former Smoker     Quit date: 1/1/1970     Smokeless tobacco: Never Used     Alcohol use No     Family History   Problem Relation Age of Onset     CANCER Father      Glaucoma Father      CANCER Mother      Hypertension Mother      Hypertension Sister      CEREBROVASCULAR DISEASE Sister      Thyroid Disease No family hx of      Macular Degeneration No family hx of          Current Outpatient Prescriptions   Medication Sig Dispense Refill     amLODIPine (NORVASC) 2.5 MG tablet Take 1 tablet (2.5 mg) by mouth daily 90 tablet 3     ascorbic acid (VITAMIN C) 1000 MG TABS Take 1,000 mg by mouth daily       aspirin 81 MG tablet Take 1 tablet (81 mg) by mouth daily 30 tablet      atorvastatin (LIPITOR) 20 MG tablet Take 1 tablet (20 mg) by mouth daily 90 tablet 3     Calcium-Vitamin D (CALCIUM + D PO) Take 1 tablet by mouth daily.       erythromycin (ROMYCIN) ophthalmic ointment Place 1 Application into both eyes At Bedtime Apply small (1/4 inch) strip to affected eye(s) 1 Tube 11     fluticasone (FLONASE) 50 MCG/ACT spray Spray 1-2 sprays into both nostrils daily 1 Bottle 11     loteprednol (ALREX) 0.2 % SUSP Place 1 drop into both eyes 2 times daily as needed 1 Bottle 1     Multiple Vitamin (MULTI-VITAMIN PO) Take 1 tablet by mouth daily.       omega-3 fatty acids (FISH OIL) 1200 MG capsule Take 1 capsule by mouth daily       order for DME Equipment being ordered: BP cuff/machine 1 Device 0     sulfamethoxazole-trimethoprim (BACTRIM DS/SEPTRA DS) 800-160 MG per tablet Take 1 tablet by mouth 2 times daily for 5 days 10 tablet 0     VAQTA 50 UNIT/ML injection Reported on 4/4/2017       Allergies   Allergen Reactions     Codeine Other (See Comments)     Ottoniel     Lisinopril Cough     Recent Labs   Lab Test  10/03/17   0728  09/11/17   1522  03/16/17   0813   "09/26/16   1206  03/07/16   1555  03/16/15   1007   08/15/13   0859 10/19/12   LDL  43   --    --   74   --   106   < >  123   --    HDL  68   --    --   57   --   72   < >  59   --    TRIG  58   --    --   60   --   77   < >  99   --    ALT   --    --    --    --   33   --    --   32   --    CR  0.78   --   0.80   --   0.83  0.73   < >  0.76   --    GFRESTIMATED  70  68  68   --   65  77   < >  73   --    GFRESTBLACK  85  82  82   --   79  >90   GFR Calc     < >  88   --    POTASSIUM  4.0   --   4.0   --   3.7  3.5   < >  3.8   --    TSH   --    --    --    --    --    --    --   3.53  2.18    < > = values in this interval not displayed.      BP Readings from Last 3 Encounters:   05/10/18 127/71   02/15/18 128/74   08/28/17 124/68    Wt Readings from Last 3 Encounters:   05/10/18 116 lb (52.6 kg)   02/15/18 114 lb (51.7 kg)   08/28/17 114 lb (51.7 kg)                  Labs reviewed in EPIC    Reviewed and updated as needed this visit by clinical staff       Reviewed and updated as needed this visit by Provider         ROS:  Constitutional, HEENT, cardiovascular, pulmonary, gi and gu systems are negative, except as otherwise noted.    OBJECTIVE:     /71 (BP Location: Left arm, Patient Position: Chair, Cuff Size: Adult Regular)  Pulse 85  Temp 97.1  F (36.2  C) (Oral)  Resp 16  Ht 5' 1\" (1.549 m)  Wt 116 lb (52.6 kg)  LMP 05/10/1978 (Approximate)  SpO2 95%  BMI 21.92 kg/m2  Body mass index is 21.92 kg/(m^2).  GENERAL: elderly, alert, well nourished, well hydrated, no distress  HENT: ear canals- did not take out hearing aids for exam; Nose- normal; Mouth- no ulcers, no lesions, missing dentition  NECK: no tenderness, no adenopathy, no asymmetry, no masses, no stiffness; thyroid- normal to palpation  RESP: lungs clear to auscultation - no rales, no rhonchi, no wheezes  CV: regular rates and rhythm, normal S1 S2, no S3 or S4 and no murmur, no click or rub, normal pulses  ABDOMEN: soft, no " "tenderness, no  hepatosplenomegaly, no masses, normal bowel sounds  MS: extremities- no gross deformities noted, no edema  SKIN: no suspicious lesions, no rashes, age related skin changes with seborrheic keratosis and no actinic keratosis.    NEURO: strength and tone- decreased, sensory exam- grossly normal, reflexes- symmetric  BACK: no CVA tenderness, no paralumbar tenderness  MENTAL STATUS EXAM:  Appearance/Behavior: no apparent distress, neatly groomed, dressed appropriately for weather, appears stated age and is frail-appearing  Speech: normal  Mood/Affect: normal affect  Insight: Excellent     Diagnostic Test Results:  Results for orders placed or performed in visit on 05/10/18 (from the past 24 hour(s))   UA reflex to Microscopic and Culture   Result Value Ref Range    Color Urine Yellow     Appearance Urine Slightly Cloudy     Glucose Urine Negative NEG^Negative mg/dL    Bilirubin Urine Negative NEG^Negative    Ketones Urine Trace (A) NEG^Negative mg/dL    Specific Gravity Urine 1.020 1.003 - 1.035    Blood Urine Trace (A) NEG^Negative    pH Urine 5.5 5.0 - 7.0 pH    Protein Albumin Urine Negative NEG^Negative mg/dL    Urobilinogen Urine 0.2 0.2 - 1.0 EU/dL    Nitrite Urine Negative NEG^Negative    Leukocyte Esterase Urine Moderate (A) NEG^Negative    Source Midstream Urine    Urine Microscopic   Result Value Ref Range    WBC Urine >100 (A) OTO5^0 - 5 /HPF    RBC Urine 2-5 (A) OTO2^O - 2 /HPF    Squamous Epithelial /LPF Urine Few FEW^Few /LPF    Bacteria Urine Few (A) NEG^Negative /HPF       ASSESSMENT/PLAN:         Tobacco Cessation:   reports that she quit smoking about 48 years ago. She has never used smokeless tobacco.      BMI:   Estimated body mass index is 21.92 kg/(m^2) as calculated from the following:    Height as of this encounter: 5' 1\" (1.549 m).    Weight as of this encounter: 116 lb (52.6 kg).           ICD-10-CM    1. Acute cystitis without hematuria N30.00 sulfamethoxazole-trimethoprim " (BACTRIM DS/SEPTRA DS) 800-160 MG per tablet twice a day for 5 days. Call if any rash or side effects. Daughter has a severe sulfa    2. Dysuria R30.0 UA reflex to Microscopic and Culture     Urine Microscopic   3. Hypertension goal BP (blood pressure) < 140/90 I10 Well controlled on medications    4. Hyperlipidemia LDL goal <70 E78.5 stable   5. Chronic seasonal allergic rhinitis, unspecified trigger J30.2 Worse with weather changes, pollen and dairy   6. Nonspecific finding on examination of urine R82.90 Urine Culture Aerobic Bacterial       FUTURE LABS:       - Schedule fasting labs in 6 months  FUTURE APPOINTMENTS:       - Follow-up visit in 6 months or sooner if any questions or concerns.   Regular exercise  See Patient Instructions    Esmer Finley MD  [facility]

## 2018-05-13 LAB
BACTERIA SPEC CULT: ABNORMAL
SPECIMEN SOURCE: ABNORMAL

## 2018-05-13 NOTE — PROGRESS NOTES
Dear Diann John,    Your test results are attached. I am happy to let you know that they are stable and your medications can stay the same.    The urine showed an infection with Klebsiella and the medication should work to make this go away. Call if you have any more symptoms. You do not need any follow up testing.    Please call me if you have any questions about these test results or about your care.    Sincerely,    Esmer Finley MD

## 2018-07-26 ENCOUNTER — TELEPHONE (OUTPATIENT)
Dept: FAMILY MEDICINE | Facility: CLINIC | Age: 83
End: 2018-07-26

## 2018-07-26 ENCOUNTER — OFFICE VISIT (OUTPATIENT)
Dept: FAMILY MEDICINE | Facility: CLINIC | Age: 83
End: 2018-07-26
Payer: COMMERCIAL

## 2018-07-26 VITALS
HEIGHT: 61 IN | OXYGEN SATURATION: 95 % | SYSTOLIC BLOOD PRESSURE: 160 MMHG | RESPIRATION RATE: 18 BRPM | DIASTOLIC BLOOD PRESSURE: 64 MMHG | HEART RATE: 72 BPM | BODY MASS INDEX: 21.9 KG/M2 | TEMPERATURE: 98.1 F | WEIGHT: 116 LBS

## 2018-07-26 DIAGNOSIS — E78.5 HYPERLIPIDEMIA LDL GOAL <70: ICD-10-CM

## 2018-07-26 DIAGNOSIS — I65.22 ASYMPTOMATIC STENOSIS OF LEFT CAROTID ARTERY: ICD-10-CM

## 2018-07-26 DIAGNOSIS — R91.8 PULMONARY NODULES: ICD-10-CM

## 2018-07-26 DIAGNOSIS — I70.90 ASVD (ARTERIOSCLEROTIC VASCULAR DISEASE): ICD-10-CM

## 2018-07-26 DIAGNOSIS — R05.3 CHRONIC COUGH: Primary | ICD-10-CM

## 2018-07-26 DIAGNOSIS — I10 HYPERTENSION GOAL BP (BLOOD PRESSURE) < 140/90: ICD-10-CM

## 2018-07-26 PROCEDURE — 99214 OFFICE O/P EST MOD 30 MIN: CPT | Performed by: FAMILY MEDICINE

## 2018-07-26 ASSESSMENT — PAIN SCALES - GENERAL: PAINLEVEL: NO PAIN (0)

## 2018-07-26 NOTE — MR AVS SNAPSHOT
After Visit Summary   7/26/2018    Diann John    MRN: [id number]           Patient Information     Date Of Birth          7/31/1931        Visit Information        Provider Department      7/26/2018 2:40 PM Esmer Finley MD [facility]        Today's Diagnoses     Chronic cough    -  1    Pulmonary nodules          Care Instructions    At [facility], we strive to deliver an exceptional experience to you, every time we see you.  If you receive a survey in the mail, please send us back your thoughts. We really do value your feedback.    Based on your medical history, these are the current health maintenance/preventive care services that you are due for (some may have been done at this visit.)  Health Maintenance Due   Topic Date Due     MICROALBUMIN Q1 YEAR  06/01/2018         Suggested websites for health information:  Www.exsulin.[employer] : Up to date and easily searchable information on multiple topics.  Www.MicroTransponder.gov : medication info, interactive tutorials, watch real surgeries online  Www.familydoctor.org : good info from the Academy of Family Physicians  Www.cdc.gov : public health info, travel advisories, epidemics (H1N1)  Www.aap.org : children's health info, normal development, vaccinations  Www.health.state.[place].us : [place] dept of health, public health issues in [place], N1N1    Your care team:                            Family Medicine Internal Medicine   MD Ghulam Santos MD Shantel Branch-Fleming, MD Katya Georgiev PA-C Nam Ho, MD Pediatrics   PANDA Hernandez, MD Jaimie Ferrari CNP, MD Deborah Mielke, MD Kim Thein, APRN [facility]      Clinic hours: Monday - Thursday 7 am-7 pm; Fridays 7 am-5 pm.   Urgent care: Monday - Friday 11 am-9 pm; Saturday and Sunday 9 am-5 pm.  Pharmacy : Monday -Thursday 8 am-8 pm; Friday 8 am-6 pm; Saturday and Sunday 9 am-5 pm.      Clinic: [phone]   Pharmacy: [phone]    Chronic Cough with Uncertain Cause (Adult)    Everyone has had a cough as part of the common cold, flu, or bronchitis. This kind of cough occurs along with an achy feeling, low-grade fever, nasal and sinus congestion, and a scratchy or sore throat. This usually gets better in 2 to 3 weeks. A cough that lasts longer than 3 weeks may be due to other causes.  If your cough does not improve over the next 2 weeks, further testing may be needed. Follow up with your healthcare provider as advised. Cough suppressants may be recommended. Based on your exam today, the exact cause of your cough is not certain. Below are some common causes for persistent cough.  Smoker's cough  Smoker s cough doesn t go away. If you continue to smoke, it only gets worse. The cough is from irritation in the air passages. Talk to your healthcare provider about quitting. Medicines or nicotine-replacement products, like gum or the patch, may make quitting easier.  Postnasal drip  A cough that is worse at night may be due to postnasal drip. Excess mucus in the nose drains from the back of your nose to your throat. This triggers the cough reflex. Postnasal drip may be due to a sinus infection or allergy. Common allergens include dust, tobacco smoke (both inhaled and secondhand smoke), environmental pollutants, pollen, mold, pets, cleaning agents, room deodorizers, and chemical fumes. Over-the-counter antihistamines or decongestants may be helpful for allergies. A sinus infection may requires antibiotic treatment. See your healthcare provider if symptoms continue.  Medicines  Certain prescribed medicines can cause a chronic cough in some people:    ACE inhibitors for high blood pressure. These include benazepril, captopril, enalapril, fosinopril, lisinopril, quinapril, ramipril, and others.    Beta-blockers for high blood pressure and other conditions. These include propranolol, atenolol,  metoprolol, nadolol, and others.  Let your healthcare provider know if you are taking any of these.  Asthma  Cough may be the only sign of mild asthma. You may have tests to find out if asthma is causing your cough. You may also take asthma medicine on a trial basis.  Acid reflux (heartburn, GERD)  The esophagus is the tube that carries food from the mouth to the stomach. A valve at its lower end prevents stomach acids from flowing upward. If this valve does not work properly, acid from the stomach enters the esophagus. This may cause a burning pain in the upper abdomen or lower chest, belching, or cough. Symptoms are often worse when lying flat. Avoid eating or drinking before bedtime. Try using extra pillows to raise your upper body, or place 4-inch blocks under the head of your bed. You may try an over-the-counter antacid or an acid-blocking medicine such as famotidine, cimetidine, ranitidine, esomeprazole, lansoprazole, or omeprazole. Stronger medicines for this condition can be prescribed by your healthcare provider.  Follow-up care  Follow up with your healthcare provider, or as advised, if your cough does not improve. Further testing may be needed.  Note: If an X-ray was taken, a specialist will review it. You will be notified of any new findings that may affect your care.  When to seek medical advice  Call your healthcare provider right away if any of these occur:    Mild wheezing or difficulty breathing    Fever of 100.4 F (38 C) or higher, or as directed by your healthcare provider    Unexpected weight loss    Coughing up large amounts of colored sputum    Night sweats (sheets and pajamas get soaking wet)  Call 911  Call 911 if any of these occur:    Coughing up blood    Moderate to severe trouble breathing or wheezing  Date Last Reviewed: 9/13/2015 2000-2017 [employer]. [address] [place] [address], [place] [address]. All rights reserved. This information is not intended as a substitute for  professional medical care. Always follow your healthcare professional's instructions.      Common Questions about Lung Nodules  What is a lung nodule?  A nodule is a small spot in the lung that is more solid than normal tissue. It is seen with a chest X-ray or a CT scan. The cause of nodules may be scar tissue, an infection or some irritant in the air. Sometimes, a nodule is an early lung cancer.  What is the chance that the nodule is an early lung cancer?  Most small nodules are not cancer. Fewer than 5 out of 100 people with nodules turn out to have cancer. The risk is greater for patients who:    Are olderw    Have a nodule of 9 mm or larger    Have smoked or still smoke cigarettes    Have added risks, such as a family history of lung cancer or working with asbestos.  If you would like to know more about your risk for lung cancer, please talk to your provider.  What size is a small lung nodule?  A small nodule is less than 9 mm across: 6, 7 or 8 mm. Picture a pea--it's 5mm and a cherry is 10 mm.     What will happen next?    Your care team will probably recommend more CT scans to watch the nodule for changes.    A nodule that is not cancer usually doesn't grow. Generally, if the nodule doesn't grow for 2 years, it is safe to stop the scans. But certain types of nodules may need a longer follow-up.    If the nodule is getting bigger, we will use other studies or do a biopsy to get a closer look.  How do you know how to time the next scan?  We decide when to do a scan based on the size of the nodule and your risk for cancer.   You can help decide when to get the next CT scan. Call your healthcare team if you have questions or concerns about the date.  Is it safe to wait for the next scan?  Most cancers grow fairly slowly. Waiting a few months for the next scan is thought to be very safe.   Why shouldn't I get a biopsy now?    Biopsies are safer for nodules that are 9 mm or larger.    During a biopsy, the doctor  removes a small piece of your lung and looks at it under a microscope. It is difficult to biopsy small nodules safely. It could cause breathing problems, bleeding or infection.  What if I'm still smoking?  We are here to help you quit! Quitting now will lower your chance of getting lung cancer, and other serious health problems like emphysema and heart disease. For help quitting: www.quitplan.com or call 1-273.502.2432 for one-on-one coaching from counselors. [place] adults may also receive free patches, gum or lozenges.  What if my nodule is lung cancer?  If a nodule turns out to be lung cancer, it is likely to be in an early stage. If treated at an early stage, you are more likely to survive the cancer.   Please talk with your health care team if you have any concerns about lung cancer. Remember:    Most small nodules are not lung cancer.    Biopsies of small nodules can cause more harm than good.    If you are still smoking, the best way to improve your health is to quit.    It is normal to be worried when there is even a small chance of lung cancer.  When should I call for help?  Call your care team if you:    Have a new or worse cough, or cough up blood    Have shortness of breath, chest pain, fevers or chills    Lose 10 pounds or more without trying to lose weight    Feel worried and anxious  Resources  [place] National Library of Medicine:   https://medlineplus.gov/ency/article/562287.htm   CDC Tobacco Resources  www.cdc.gov/tobacco/campaign/tips/quit-smoking  www.Smokefree.gov  For informational purposes only. Not to replace the advice of your health care provider. Copyright  2016 [employer]. All rights reserved. [employer] 555561 - Rev 11/16.            Follow-ups after your visit        Additional Services     PULMONARY MEDICINE REFERRAL       Your provider has referred you to: UM: [facility] [place] [phone]    [email or web address]    Please be aware that coverage of these services is subject to the terms and limitations of your health insurance plan.  Call member services at your health plan with any benefit or coverage questions.      Please bring the following with you to your appointment:    (1) Any X-Rays, CTs or MRIs which have been performed.  Contact the facility where they were done to arrange for  prior to your scheduled appointment.    (2) List of current medications   (3) This referral request   (4) Any documents/labs given to you for this referral                  Follow-up notes from your care team     Return in about 3 months (around 10/26/2018) for medication follow up.      Your next 10 appointments already scheduled     Sep 11, 2018 10:00 AM CDT   New Visit with Lisa Page MD   [facility] ([facility]    [address] [address] [phone]              Who to contact     If you have questions or need follow up information about today's clinic visit or your schedule please contact [facility] directly at [phone].  Normal or non-critical lab and imaging results will be communicated to you by MyChart, letter or phone within 4 business days after the clinic has received the results. If you do not hear from us within 7 days, please contact the clinic through MyChart or phone. If you have a critical or abnormal lab result, we will notify you by phone as soon as possible.  Submit refill requests through [employer] or call your pharmacy and they will forward the refill request to us. Please allow 3 business days for your refill to be completed.          Additional Information About Your Visit        [employer] Information     [employer] lets you send messages to your doctor, view your test results, renew your prescriptions, schedule appointments and more. To sign up, go to  "[email or web address] . Click on \"Log in\" on the left side of the screen, which will take you to the Welcome page. Then click on \"Sign up Now\" on the right side of the page.     You will be asked to enter the access code listed below, as well as some personal information. Please follow the directions to create your username and password.     Your access code is: [email or web address]  Expires: 10/24/2018  3:06 PM     Your access code will  in 90 days. If you need help or a new code, please call your [place] clinic or [phone].        Care EveryWhere ID     This is your Care EveryWhere ID. This could be used by other organizations to access your [place] medical records  [id number]        Your Vitals Were     Pulse Temperature Respirations Height Last Period Pulse Oximetry    72 98.1  F (36.7  C) (Oral) 18 5' 1\" (1.549 m) [birth year]1978 (Approximate) 95%    Breastfeeding? BMI (Body Mass Index)                No 21.92 kg/m2           Blood Pressure from Last 3 Encounters:   18 160/64   05/10/18 127/71   02/15/18 128/74    Weight from Last 3 Encounters:   18 116 lb (52.6 kg)   05/10/18 116 lb (52.6 kg)   02/15/18 114 lb (51.7 kg)              We Performed the Following     PULMONARY MEDICINE REFERRAL        Primary Care Provider Office Phone # Fax #    Esmer Krysten Finley [phone]       [address]  [facility] [address]        Equal Access to Services     [facility]: Hadii reji tsang hadasho Sohazel, waaxda luqadaha, qaybta kaalmada fabian meyers. So [facility] [phone].    ATENCIÓN: Si habla español, tiene a moreno disposición servicios gratuitos de asistencia lingüística. Kirk al [phone].    We comply with applicable federal civil rights laws and [place] laws. We do not discriminate on the basis of race, color, national origin, age, disability, sex, sexual orientation, or gender identity.            Thank you!     Thank you for choosing " [facility]  for your care. Our goal is always to provide you with excellent care. Hearing back from our patients is one way we can continue to improve our services. Please take a few minutes to complete the written survey that you may receive in the mail after your visit with us. Thank you!             Your Updated Medication List - Protect others around you: Learn how to safely use, store and throw away your medicines at www.disposemymeds.org.          This list is accurate as of 7/26/18  3:06 PM.  Always use your most recent med list.                   Brand Name Dispense Instructions for use Diagnosis    amLODIPine 2.5 MG tablet    NORVASC    90 tablet    Take 1 tablet (2.5 mg) by mouth daily    Essential hypertension with goal blood pressure less than 140/90       ascorbic acid 1000 MG Tabs    vitamin C     Take 1,000 mg by mouth daily        aspirin 81 MG tablet     30 tablet    Take 1 tablet (81 mg) by mouth daily        atorvastatin 20 MG tablet    LIPITOR    90 tablet    Take 1 tablet (20 mg) by mouth daily    Carotid artery stenosis, asymptomatic, left       CALCIUM + D PO      Take 1 tablet by mouth daily.        erythromycin ophthalmic ointment    ROMYCIN    1 Tube    Place 1 Application into both eyes At Bedtime Apply small (1/4 inch) strip to affected eye(s)    Squamous blepharitis, unspecified laterality       fluticasone 50 MCG/ACT spray    FLONASE    1 Bottle    Spray 1-2 sprays into both nostrils daily    Seasonal allergic rhinitis, unspecified allergic rhinitis trigger       loteprednol 0.2 % Susp ophthalmic susp    ALREX    1 Bottle    Place 1 drop into both eyes 2 times daily as needed    Blepharitis, unspecified laterality       MULTI-VITAMIN PO      Take 1 tablet by mouth daily.        omega-3 fatty acids 1200 MG capsule      Take 1 capsule by mouth daily        order for DME     1 Device    Equipment being ordered: BP cuff/machine    Hypertension goal BP (blood pressure) <  140/90       VAQTA 50 UNIT/ML injection   Generic drug:  hepatitis A vaccine      Reported on 4/4/2017

## 2018-07-26 NOTE — TELEPHONE ENCOUNTER
[facility] Call Center    Phone Message    May a detailed message be left on voicemail: yes    Reason for Call: Patient called [facility] to schedule with a Pulmonologist. Currently scheduled  8/6/18. Per Protocols a PFT test is required. We do not currently perform those at our location. Patient is looking to get that done at [facility] prior to 8/6/18. Per [facility] they are requesting a PFT order/Referral so they can schedule that test. Thank you    Action Taken:

## 2018-07-26 NOTE — PROGRESS NOTES
SUBJECTIVE:   Diann John is a 86 year old female who presents to clinic today for the following health issues:    Acute Illness   Acute illness concerns: Nasal congestion, cough, sinus  Onset: Worsened x2 months. Patient mentions was discuss at previous visit and told to decrease milk products and did help with symptoms some. Wants to discuss about alternative to getting calcium in. Recommend seeing allergist?    Fever: no     Chills/Sweats: no     Headache (location?): no     Sinus Pressure:no    Conjunctivitis:  YES- intermittent when out in the wind    Ear Pain: YES- intermittent when out in the wind    Rhinorrhea: YES    Congestion: YES    Sore Throat: no      Cough: YES    Wheeze: no     Decreased Appetite: no     Nausea: no     Vomiting: no     Diarrhea:  no     Dysuria/Freq.: no     Fatigue/Achiness: YES    Sick/Strep Exposure: no      Therapies Tried and outcome: decreasing milk products      Hyperlipidemia Follow-Up      Rate your low fat/cholesterol diet?: good    Taking statin?  Yes, no muscle aches from statin    Other lipid medications/supplements?:  none    Hypertension Follow-up      Outpatient blood pressures are not being checked.    Low Salt Diet: no added salt    Vascular Disease Follow-up:  Coronary Artery Disease (CAD)      Chest pain or pressure, left side neck or arm pain: No    Shortness of breath/increased sweats/nausea with exertion: No    Pain in calves walking 1-2 blocks: No    Worsened or new symptoms since last visit: No    Nitroglycerin use: no    Daily aspirin use: Yes      Problem list and histories reviewed & adjusted, as indicated.  Additional history: as documented    Patient Active Problem List   Diagnosis     Macular pucker, right eye     Pseudophakia OU     Blepharitis     Hypertension goal BP (blood pressure) < 140/90     Seasonal allergies     Carotid artery stenosis, asymptomatic     PVD (posterior vitreous detachment) OU     Advanced directives, counseling/discussion      ASVD (arteriosclerotic vascular disease)     Hyperlipidemia LDL goal <70     Chronic rhinitis     Pulmonary nodules     Past Surgical History:   Procedure Laterality Date     APPENDECTOMY       C RAD RESEC TONSIL/PILLARS      Adeniods     CATARACT IOL, RT/LT Right      HAND SURGERY       NOSE SURGERY         Social History   Substance Use Topics     Smoking status: Former Smoker     Quit date: 1/1/1970     Smokeless tobacco: Never Used     Alcohol use No     Family History   Problem Relation Age of Onset     Cancer Father      Glaucoma Father      Cancer Mother      Hypertension Mother      Hypertension Sister      Cerebrovascular Disease Sister      Thyroid Disease No family hx of      Macular Degeneration No family hx of          Current Outpatient Prescriptions   Medication Sig Dispense Refill     amLODIPine (NORVASC) 2.5 MG tablet Take 1 tablet (2.5 mg) by mouth daily 90 tablet 3     ascorbic acid (VITAMIN C) 1000 MG TABS Take 1,000 mg by mouth daily       aspirin 81 MG tablet Take 1 tablet (81 mg) by mouth daily 30 tablet      atorvastatin (LIPITOR) 20 MG tablet Take 1 tablet (20 mg) by mouth daily 90 tablet 3     Calcium-Vitamin D (CALCIUM + D PO) Take 1 tablet by mouth daily.       erythromycin (ROMYCIN) ophthalmic ointment Place 1 Application into both eyes At Bedtime Apply small (1/4 inch) strip to affected eye(s) 1 Tube 11     fluticasone (FLONASE) 50 MCG/ACT spray Spray 1-2 sprays into both nostrils daily 1 Bottle 11     loteprednol (ALREX) 0.2 % SUSP Place 1 drop into both eyes 2 times daily as needed 1 Bottle 1     Multiple Vitamin (MULTI-VITAMIN PO) Take 1 tablet by mouth daily.       omega-3 fatty acids (FISH OIL) 1200 MG capsule Take 1 capsule by mouth daily       order for DME Equipment being ordered: BP cuff/machine 1 Device 0     VAQTA 50 UNIT/ML injection Reported on 4/4/2017       Allergies   Allergen Reactions     Codeine Other (See Comments)     Heaaches     Lisinopril Cough     Recent  "Labs   Lab Test  10/03/17   0728  09/11/17   1522  03/16/17   0813  09/26/16   1206  03/07/16   1555  03/16/15   1007   08/15/13   0859 10/19/12   LDL  43   --    --   74   --   106   < >  123   --    HDL  68   --    --   57   --   72   < >  59   --    TRIG  58   --    --   60   --   77   < >  99   --    ALT   --    --    --    --   33   --    --   32   --    CR  0.78   --   0.80   --   0.83  0.73   < >  0.76   --    GFRESTIMATED  70  68  68   --   65  77   < >  73   --    GFRESTBLACK  85  82  82   --   79  >90   GFR Calc     < >  88   --    POTASSIUM  4.0   --   4.0   --   3.7  3.5   < >  3.8   --    TSH   --    --    --    --    --    --    --   3.53  2.18    < > = values in this interval not displayed.      BP Readings from Last 3 Encounters:   07/26/18 160/64   05/10/18 127/71   02/15/18 128/74    Wt Readings from Last 3 Encounters:   07/26/18 116 lb (52.6 kg)   05/10/18 116 lb (52.6 kg)   02/15/18 114 lb (51.7 kg)                  Labs reviewed in EPIC    Reviewed and updated as needed this visit by clinical staff  Tobacco  Allergies  Meds  Med Hx  Surg Hx  Fam Hx  Soc Hx      Reviewed and updated as needed this visit by Provider         ROS:  Constitutional, HEENT, cardiovascular, pulmonary, gi and gu systems are negative, except as otherwise noted.    OBJECTIVE:     /64 (BP Location: Left arm, Patient Position: Chair, Cuff Size: Adult Regular)  Pulse 72  Temp 98.1  F (36.7  C) (Oral)  Resp 18  Ht 5' 1\" (1.549 m)  Wt 116 lb (52.6 kg)  LMP 05/10/1978 (Approximate)  SpO2 95%  Breastfeeding? No  BMI 21.92 kg/m2  Body mass index is 21.92 kg/(m^2).  GENERAL APPEARANCE: healthy, thin, alert and no distress  EYES: Eyes grossly normal to inspection, PERRL and conjunctivae and sclerae normal  HENT: ear canals and TM's normal, nose and mouth without ulcers or lesions, oropharynx clear and oral mucous membranes moist  NECK: no adenopathy, no asymmetry, masses, or scars and thyroid " normal to palpation  RESP: lungs clear to auscultation - no rales, rhonchi or wheezes  CV: regular rates and rhythm, normal S1 S2, no S3 or S4, no murmur, click or rub, no peripheral edema and peripheral pulses strong  ABDOMEN: soft, nontender, no hepatosplenomegaly, no masses and bowel sounds normal  MS: no musculoskeletal defects are noted and gait is age appropriate without ataxia  SKIN: no suspicious lesions or rashes  NEURO: Normal strength and tone, sensory exam grossly normal, mentation intact and speech normal  PSYCH: mentation appears normal and affect normal/bright     Diagnostic Test Results:  Results for orders placed or performed in visit on 05/10/18   UA reflex to Microscopic and Culture   Result Value Ref Range    Color Urine Yellow     Appearance Urine Slightly Cloudy     Glucose Urine Negative NEG^Negative mg/dL    Bilirubin Urine Negative NEG^Negative    Ketones Urine Trace (A) NEG^Negative mg/dL    Specific Gravity Urine 1.020 1.003 - 1.035    Blood Urine Trace (A) NEG^Negative    pH Urine 5.5 5.0 - 7.0 pH    Protein Albumin Urine Negative NEG^Negative mg/dL    Urobilinogen Urine 0.2 0.2 - 1.0 EU/dL    Nitrite Urine Negative NEG^Negative    Leukocyte Esterase Urine Moderate (A) NEG^Negative    Source Midstream Urine    Urine Microscopic   Result Value Ref Range    WBC Urine >100 (A) OTO5^0 - 5 /HPF    RBC Urine 2-5 (A) OTO2^O - 2 /HPF    Squamous Epithelial /LPF Urine Few FEW^Few /LPF    Bacteria Urine Few (A) NEG^Negative /HPF   Urine Culture Aerobic Bacterial   Result Value Ref Range    Specimen Description Midstream Urine     Culture Micro >100,000 colonies/mL  Klebsiella pneumoniae   (A)        Susceptibility    Klebsiella pneumoniae - SANDRA     AMPICILLIN* >=32 Resistant ug/mL      * Intrinsically Resistant     CEFAZOLIN* <=4 Sensitive ug/mL      * Cefazolin SANDRA breakpoints are for the treatment of uncomplicated urinary tract infections.  For the treatment of systemic infections, please contact  "the laboratory for additional testing.     CEFOXITIN <=4 Sensitive ug/mL     CEFTAZIDIME <=1 Sensitive ug/mL     CEFTRIAXONE <=1 Sensitive ug/mL     CIPROFLOXACIN <=0.25 Sensitive ug/mL     GENTAMICIN <=1 Sensitive ug/mL     LEVOFLOXACIN <=0.12 Sensitive ug/mL     NITROFURANTOIN 64 Intermediate ug/mL     TOBRAMYCIN <=1 Sensitive ug/mL     Trimethoprim/Sulfa <=1/19 Sensitive ug/mL     AMPICILLIN/SULBACTAM 4 Sensitive ug/mL     Piperacillin/Tazo <=4 Sensitive ug/mL     CEFEPIME <=1 Sensitive ug/mL       ASSESSMENT/PLAN:         Tobacco Cessation:   reports that she quit smoking about 48 years ago. She has never used smokeless tobacco.      BMI:   Estimated body mass index is 21.92 kg/(m^2) as calculated from the following:    Height as of this encounter: 5' 1\" (1.549 m).    Weight as of this encounter: 116 lb (52.6 kg).   Weight management plan: Discussed healthy diet and exercise guidelines and patient will follow up in 3 months in clinic to re-evaluate.        ICD-10-CM    1. Chronic cough with excess mucous. Seems to be from chronic rhinitis but also had abnormal findings on CT scan. Coughed up blood 4 days ago and this was after a severe coughing spasm. Intermittent mucous in back of throat, usually clear.  R05 PULMONARY MEDICINE REFERRAL     General PFT Lab (Please always keep checked)     Pulmonary Function Test   2. Pulmonary nodules R91.8 PULMONARY MEDICINE REFERRAL     General PFT Lab (Please always keep checked)   3. Hypertension goal BP (blood pressure) < 140/90 I10 Very upset today and did not want to change blood pressure medication. Will follow up in 1 month for recheck.    4. Asymptomatic stenosis of left carotid artery I65.22 Follow up as needed. On aspirin    5. Hyperlipidemia LDL goal <70 E78.5 Well controlled on medications    6. ASVD (arteriosclerotic vascular disease) I70.90 No current symptoms        FURTHER TESTING:       - pulmonary function tests  CONSULTATION/REFERRAL to pulmonary  FUTURE " LABS:       - Schedule fasting labs in 3 months  FUTURE APPOINTMENTS:       - Follow-up visit in 3 fumno   See Patient Instructions    Esmer Finley MD  [facility]

## 2018-08-03 DIAGNOSIS — R05.3 CHRONIC COUGH: ICD-10-CM

## 2018-08-06 ENCOUNTER — OFFICE VISIT (OUTPATIENT)
Dept: PULMONOLOGY | Facility: CLINIC | Age: 83
End: 2018-08-06
Attending: FAMILY MEDICINE
Payer: COMMERCIAL

## 2018-08-06 ENCOUNTER — RADIANT APPOINTMENT (OUTPATIENT)
Dept: CT IMAGING | Facility: CLINIC | Age: 83
End: 2018-08-06
Attending: INTERNAL MEDICINE
Payer: COMMERCIAL

## 2018-08-06 VITALS
OXYGEN SATURATION: 99 % | RESPIRATION RATE: 20 BRPM | BODY MASS INDEX: 22.13 KG/M2 | SYSTOLIC BLOOD PRESSURE: 174 MMHG | WEIGHT: 117.1 LBS | DIASTOLIC BLOOD PRESSURE: 72 MMHG | HEART RATE: 67 BPM

## 2018-08-06 DIAGNOSIS — R91.8 PULMONARY NODULES: ICD-10-CM

## 2018-08-06 DIAGNOSIS — J44.9 CHRONIC OBSTRUCTIVE PULMONARY DISEASE, UNSPECIFIED COPD TYPE (H): ICD-10-CM

## 2018-08-06 DIAGNOSIS — R91.8 PULMONARY NODULES: Primary | ICD-10-CM

## 2018-08-06 PROCEDURE — 71250 CT THORAX DX C-: CPT | Performed by: RADIOLOGY

## 2018-08-06 PROCEDURE — 99204 OFFICE O/P NEW MOD 45 MIN: CPT | Performed by: INTERNAL MEDICINE

## 2018-08-06 RX ORDER — ALBUTEROL SULFATE 90 UG/1
2 AEROSOL, METERED RESPIRATORY (INHALATION) EVERY 6 HOURS PRN
Qty: 1 INHALER | Refills: 0 | Status: SHIPPED | OUTPATIENT
Start: 2018-08-06 | End: 2019-08-05

## 2018-08-06 ASSESSMENT — PAIN SCALES - GENERAL: PAINLEVEL: NO PAIN (0)

## 2018-08-06 NOTE — PROGRESS NOTES
LUNG NODULE & INTERVENTIONAL PULMONARY CLINIC  [facility]     Diann John MRN# [id number]   Age: 87 year old YOB: 1931     Reason for Consultation: lung nodule(s) and ground glass nodule    Requesting Physician: Esmer Finley MD  [id number] [place] [address]       Assessment and Plan:    1. Established multiple pulmonary lung nodule(s). Given the characteristics on current/previous imaging and risk factors; I would classify this to be Low (<6%) risk for cancer. Previously seen RUL 8mm nodule is almost resolved. Has some bilateral sub-7mm nodules that are stable. I will repeat CT chest today to confirm. If all stable, no further surveillance necessary.     2. COPD. Has evidence of emphysema on chest CT and obstructive pattern with air trapping on PFT . She is largely asymptomatic. She is willing to try prn albuterol before her walks to see if there is improvement.     Back in clinic in 4mo    Billing: The patient was seen and examined by me and the findings, assessment, and plan as documented was explained to the patient/family who expressed understand.       Luis Gray MD   of Medicine  Interventional Pulmonology  Department of Pulmonary, Allergy, Critical Care and Sleep Medicine   [facility]  Pager: [phone]          History:     Diann John is a 87 year old female with sig h/o for HTN, hyperlipidemia, and allergies who is here for evaluation/followup of lung nodule(s) and ground glass nodule.    - No new resp sx or complaints. Denies dyspnea or cough today. Had cold/URI sx about ~3wks ago. Today, reports resolution of those sx. She routinely walks 3mi 3times/wk.   - One year ago, had CT angio head/neck and found incidental nodule (8mm in RUL). Had short term f/u in 9/2017 which showed resolution of the RUL nodule. There are 7mm and sub-6mm nodules that have  been stable.    - Personal hx of cancer: No. Normal mammograms and c-scope previously.   - Family hx of cancer: No.   of lung cancer.   - Exposure hx: Denies asbestos or radon exposure   - Tobacco hx: Past Smoker: 0.5ppd for 30years. Quit 36yrs ago.   - My interpretation of the images relevant for this visit includes: previous RUL nodule is resolving. sub7mm bilateral nodules are stable. RLL anterior scarring in areas of emphysema    - My interpretation of the PFT's relevant for this visit includes: Obstructive pattern     Culprit Nodule(s):   1: Right upper lobe nodule and is 8 mm in size/severity and non-calcified in morphology/quality. First seen by chest CT on 2017. There is a decrease in size.    Other nodules:   Multiple bilateral lung nodule(s) and ground glass nodule that are sub 7 mm. First seen by chest CT on 2017. There is no interval change    Other active medical problems include:   - Has HTN and high cholesterol. Stable.  - No previous hospitalizations for breathing. Up-to-date on vaccinations.             Past Medical History:      Past Medical History:   Diagnosis Date     Hypertension      Nonsenile cataract      Postherpetic neuralgia      Shingles     3 x      Steroid responders 2014           Past Surgical History:      Past Surgical History:   Procedure Laterality Date     APPENDECTOMY       C RAD RESEC TONSIL/PILLARS      Adeniods     CATARACT IOL, RT/LT Right      HAND SURGERY       NOSE SURGERY            Social History:     Social History   Substance Use Topics     Smoking status: Former Smoker     Quit date: 1970     Smokeless tobacco: Never Used     Alcohol use No          Family History:     Family History   Problem Relation Age of Onset     Cancer Father      Glaucoma Father      Cancer Mother      Hypertension Mother      Hypertension Sister      Cerebrovascular Disease Sister      Thyroid Disease No family hx of      Macular Degeneration No family hx of             Allergies:      Allergies   Allergen Reactions     Codeine Other (See Comments)     Ottoniel     Lisinopril Cough          Medications:     Current Outpatient Prescriptions   Medication Sig     amLODIPine (NORVASC) 2.5 MG tablet Take 1 tablet (2.5 mg) by mouth daily     ascorbic acid (VITAMIN C) 1000 MG TABS Take 1,000 mg by mouth daily     aspirin 81 MG tablet Take 1 tablet (81 mg) by mouth daily     atorvastatin (LIPITOR) 20 MG tablet Take 1 tablet (20 mg) by mouth daily     Calcium-Vitamin D (CALCIUM + D PO) Take 1 tablet by mouth daily.     erythromycin (ROMYCIN) ophthalmic ointment Place 1 Application into both eyes At Bedtime Apply small (1/4 inch) strip to affected eye(s)     fluticasone (FLONASE) 50 MCG/ACT spray Spray 1-2 sprays into both nostrils daily     loteprednol (ALREX) 0.2 % SUSP Place 1 drop into both eyes 2 times daily as needed     omega-3 fatty acids (FISH OIL) 1200 MG capsule Take 1 capsule by mouth daily     Multiple Vitamin (MULTI-VITAMIN PO) Take 1 tablet by mouth daily.     order for DME Equipment being ordered: BP cuff/machine     VAQTA 50 UNIT/ML injection Inject 50 Units into the muscle once Reported on 4/4/2017     No current facility-administered medications for this visit.      Facility-Administered Medications Ordered in Other Visits   Medication     DOBUTamine 500 mg in dextrose 5% 250 mL (adult std)          Review of Systems:     CONSTITUTIONAL: negative for fever, chills, change in weight  INTEGUMENTARY/SKIN: no rash or obvious new lesions  ENT/MOUTH: no sore throat, new sinus pain or nasal drainage  RESP: see interval history  CV: negative for chest pain, palpitations or peripheral edema  GI: no nausea, vomiting, change in stools  : no dysuria  MUSCULOSKELETAL: no myalgias, arthralgias  ENDOCRINE: blood sugars with adequate control  PSYCHIATRIC: mood stable  LYMPHATIC: no new lymphadenopathy  HEME: no bleeding or easy bruisability  NEURO: no numbness, weakness, headaches          Physical Exam:     Pulse:  [67] 67  Resp:  [20] 20  BP: (174)/(72) 174/72  SpO2:  [99 %] 99 %  Wt Readings from Last 4 Encounters:   08/06/18 53.1 kg (117 lb 1.6 oz)   07/26/18 52.6 kg (116 lb)   05/10/18 52.6 kg (116 lb)   02/15/18 51.7 kg (114 lb)     Constitutional:   Awake, alert and in no apparent distress     Eyes:   Nonicteric, JAMAR     ENT:    Trachea is midline. No gross neck abnormalities      Neck:   Supple without supraclavicular or cervical lymphadenopathy     Lungs:   Good air flow.  No crackles. No rhonchi.  No wheezes.     Cardiovascular:   Normal S1 and S2.  RRR.  No murmur, gallop or rub.  Radial, DP and PT pulses normal and symmetric     Abdomen:   NABS, soft, nontender, nondistended.  No HSM.     Musculoskeletal:   No edema.      Neurologic:   Alert and conversant. Cranial nerves  intact.       Skin:   Warm, dry.  No rash on limited exam.           Current Laboratory Data:   All laboratory and imaging data reviewed.    No results found for this or any previous visit (from the past 24 hour(s)).         Previous Pulmonary Function Testing     FVC-Pred   Date Value Ref Range Status   08/03/2018 2.12 L      FVC-Pre   Date Value Ref Range Status   08/03/2018 3.06 L      FVC-%Pred-Pre   Date Value Ref Range Status   08/03/2018 144 %      FEV1-Pre   Date Value Ref Range Status   08/03/2018 1.91 L      FEV1-%Pred-Pre   Date Value Ref Range Status   08/03/2018 119 %      FEV1FVC-Pred   Date Value Ref Range Status   08/03/2018 72 %      FEV1FVC-Pre   Date Value Ref Range Status   08/03/2018 62 %      No results found for: 20029  FEFMax-Pred   Date Value Ref Range Status   08/03/2018 3.62 L/sec      FEFMax-Pre   Date Value Ref Range Status   08/03/2018 5.54 L/sec      FEFMax-%Pred-Pre   Date Value Ref Range Status   08/03/2018 153 %      ExpTime-Pre   Date Value Ref Range Status   08/03/2018 6.81 sec      FIFMax-Pre   Date Value Ref Range Status   08/03/2018 3.83 L/sec      FEV1FEV6-Pred   Date Value  Ref Range Status   08/03/2018 77 %      FEV1FEV6-Pre   Date Value Ref Range Status   08/03/2018 63 %      No results found for: 20055         Previous Chest Imaging   No images are attached to the encounter.  No images are attached to the encounter or orders placed in the encounter.         Previous Cardiology Imaging   No results found for this or any previous visit (from the past 8760 hour(s)).

## 2018-08-06 NOTE — MR AVS SNAPSHOT
After Visit Summary   8/6/2018    Diann John    MRN: [id number]           Patient Information     Date Of Birth          7/31/1931        Visit Information        Provider Department      8/6/2018 1:00 PM Luis Gray MD [facility]        Today's Diagnoses     Pulmonary nodules    -  1    Chronic obstructive pulmonary disease, unspecified COPD type (H)           Follow-ups after your visit        Your next 10 appointments already scheduled     Sep 11, 2018 10:00 AM CDT   New Visit with Lisa Page MD   [facility] ([address] [address]   [phone]            Dec 17, 2018  9:30 AM CST   Return Visit with Luis Gray MD   [facility] ([facility])    [address] [place] [id number] [phone]              Future tests that were ordered for you today     Open Future Orders        Priority Expected Expires Ordered    CT Chest w/o Contrast Routine  8/6/2019 8/6/2018            Who to contact     If you have questions or need follow up information about today's clinic visit or your schedule please contact [facility] directly at [phone].  Normal or non-critical lab and imaging results will be communicated to you by AkesoGenXhart, letter or phone within 4 business days after the clinic has received the results. If you do not hear from us within 7 days, please contact the clinic through AkesoGenXhart or phone. If you have a critical or abnormal lab result, we will notify you by phone as soon as possible.  Submit refill requests through in3Dgallery or call your pharmacy and they will forward the refill request to us. Please allow 3 business days for your refill to be completed.          Additional Information About Your Visit        AkesoGenXhart Information     in3Dgallery is an electronic gateway that provides easy, online access to your medical records.  With Wowo, you can request a clinic appointment, read your test results, renew a prescription or communicate with your care team.     To sign up for Wowo visit the website at [email or web address]   You will be asked to enter the access code listed below, as well as some personal information. Please follow the directions to create your username and password.     Your access code is: [email or web address]  Expires: 10/24/2018  3:06 PM     Your access code will  in 90 days. If you need help or a new code, please contact your [facility] or call [phone] for assistance.        Care EveryWhere ID     This is your Care EveryWhere ID. This could be used by other organizations to access your [facility] medical records  [id number]        Your Vitals Were     Pulse Respirations Last Period Pulse Oximetry BMI (Body Mass Index)       67 20 05/10/1978 (Approximate) 99% 22.13 kg/m2        Blood Pressure from Last 3 Encounters:   18 174/72   18 160/64   05/10/18 127/71    Weight from Last 3 Encounters:   18 53.1 kg (117 lb 1.6 oz)   18 52.6 kg (116 lb)   05/10/18 52.6 kg (116 lb)                 Today's Medication Changes          These changes are accurate as of 18  1:31 PM.  If you have any questions, ask your nurse or doctor.               Start taking these medicines.        Dose/Directions    albuterol 108 (90 Base) MCG/ACT Inhaler   Commonly known as:  PROAIR HFA/PROVENTIL HFA/VENTOLIN HFA   Used for:  Chronic obstructive pulmonary disease, unspecified COPD type (H)   Started by:  Luis Gray MD        Dose:  2 puff   Inhale 2 puffs into the lungs every 6 hours as needed for shortness of breath / dyspnea or wheezing   Quantity:  1 Inhaler   Refills:  0            Where to get your medicines      These medications were sent to [facility] [address] [place], [place] - [address] [facility]  [address] [facility]  [address]     Phone:  [phone]     albuterol 108 (90 Base) MCG/ACT Inhaler                Primary Care Provider Office Phone # Fax #    Esmer Krysten Finley [phone]       [address]  [place] [facility] [address]        Equal Access to Services     KARINE VIRGEN : Hadii aad ku hadasho Soomaali, waaxda luqadaha, qaybta kaalmada adeegyada, waxay idiin hayaan aderoxie khsamanthash laaylinn . So [facility] [phone].    ATENCIÓN: Si habla español, tiene a moreno disposición servicios gratuitos de asistencia lingüística. Santinoame al [phone].    We comply with applicable federal civil rights laws and [place] laws. We do not discriminate on the basis of race, color, national origin, age, disability, sex, sexual orientation, or gender identity.            Thank you!     Thank you for choosing [facility]  for your care. Our goal is always to provide you with excellent care. Hearing back from our patients is one way we can continue to improve our services. Please take a few minutes to complete the written survey that you may receive in the mail after your visit with us. Thank you!             Your Updated Medication List - Protect others around you: Learn how to safely use, store and throw away your medicines at www.disposemymeds.org.          This list is accurate as of 8/6/18  1:31 PM.  Always use your most recent med list.                   Brand Name Dispense Instructions for use Diagnosis    albuterol 108 (90 Base) MCG/ACT Inhaler    PROAIR HFA/PROVENTIL HFA/VENTOLIN HFA    1 Inhaler    Inhale 2 puffs into the lungs every 6 hours as needed for shortness of breath / dyspnea or wheezing    Chronic obstructive pulmonary disease, unspecified COPD type (H)       amLODIPine 2.5 MG tablet    NORVASC    90 tablet    Take 1 tablet (2.5 mg) by mouth daily    Essential hypertension with goal blood pressure less than 140/90       ascorbic acid 1000 MG Tabs    vitamin C     Take 1,000 mg by mouth daily         aspirin 81 MG tablet     30 tablet    Take 1 tablet (81 mg) by mouth daily        atorvastatin 20 MG tablet    LIPITOR    90 tablet    Take 1 tablet (20 mg) by mouth daily    Carotid artery stenosis, asymptomatic, left       CALCIUM + D PO      Take 1 tablet by mouth daily.        erythromycin ophthalmic ointment    ROMYCIN    1 Tube    Place 1 Application into both eyes At Bedtime Apply small (1/4 inch) strip to affected eye(s)    Squamous blepharitis, unspecified laterality       fluticasone 50 MCG/ACT spray    FLONASE    1 Bottle    Spray 1-2 sprays into both nostrils daily    Seasonal allergic rhinitis, unspecified allergic rhinitis trigger       loteprednol 0.2 % Susp ophthalmic susp    ALREX    1 Bottle    Place 1 drop into both eyes 2 times daily as needed    Blepharitis, unspecified laterality       MULTI-VITAMIN PO      Take 1 tablet by mouth daily.        omega-3 fatty acids 1200 MG capsule      Take 1 capsule by mouth daily        order for DME     1 Device    Equipment being ordered: BP cuff/machine    Hypertension goal BP (blood pressure) < 140/90       VAQTA 50 UNIT/ML injection   Generic drug:  hepatitis A vaccine      Inject 50 Units into the muscle once Reported on 4/4/2017

## 2018-08-06 NOTE — NURSING NOTE
Diann John's goals for this visit include: consult  She requests these members of her care team be copied on today's visit information:     PCP: Esmer Finley    Referring Provider:  Esmer Finley MD  [address] [place] [address]    /72  Pulse 67  Resp 20  Wt 53.1 kg (117 lb 1.6 oz)  LMP 05/10/1978 (Approximate)  SpO2 99%  BMI 22.13 kg/m2    Do you need any medication refills at today's visit? n

## 2018-08-13 ENCOUNTER — TELEPHONE (OUTPATIENT)
Dept: PULMONOLOGY | Facility: CLINIC | Age: 83
End: 2018-08-13

## 2018-08-13 NOTE — TELEPHONE ENCOUNTER
Per Dr Gray, the results are stable. The pt was informed of this and she had no further questions at this time.  Monika David RN

## 2018-08-13 NOTE — TELEPHONE ENCOUNTER
[facility] Call Center    Phone Message    May a detailed message be left on voicemail: no    Reason for Call: Requesting Results   Name/type of test: CT   Date of test: 8/6/18  Was test done at a location other than [facility] (Please fill in the location if not [facility])?: No      Action Taken: Message routed to:  Adult Clinics: Pulmonology p [id number]

## 2018-08-16 DIAGNOSIS — I10 ESSENTIAL HYPERTENSION WITH GOAL BLOOD PRESSURE LESS THAN 140/90: ICD-10-CM

## 2018-08-16 DIAGNOSIS — I65.22 CAROTID ARTERY STENOSIS, ASYMPTOMATIC, LEFT: ICD-10-CM

## 2018-08-16 RX ORDER — AMLODIPINE BESYLATE 2.5 MG/1
TABLET ORAL
Qty: 90 TABLET | Refills: 3 | Status: SHIPPED | OUTPATIENT
Start: 2018-08-16 | End: 2019-08-08

## 2018-08-16 NOTE — TELEPHONE ENCOUNTER
"Requested Prescriptions   Pending Prescriptions Disp Refills     atorvastatin (LIPITOR) 20 MG tablet [Pharmacy Med Name: ATORVASTATIN 20MG   TAB]  Last Written Prescription Date:  08/28/17  Last Fill Quantity: 90,  # refills: 3   Last Office Visit with FM[facility], P or [facility] prescribing provider:  07/26/18   Future Office Visit:    90 tablet 3     Sig: TAKE ONE TABLET BY MOUTH ONCE DAILY    Statins Protocol Passed    8/16/2018 11:12 AM       Passed - LDL on file in past 12 months    Recent Labs   Lab Test  10/03/17   0728   LDL  43            Passed - No abnormal creatine kinase in past 12 months    No lab results found.            Passed - Recent (12 mo) or future (30 days) visit within the authorizing provider's specialty    Patient had office visit in the last 12 months or has a visit in the next 30 days with authorizing provider or within the authorizing provider's specialty.  See \"Patient Info\" tab in inbasket, or \"Choose Columns\" in Meds & Orders section of the refill encounter.           Passed - Patient is age 18 or older       Passed - No active pregnancy on record       Passed - No positive pregnancy test in past 12 months          "

## 2018-08-16 NOTE — TELEPHONE ENCOUNTER
Reason for Call:  Other prescription    Detailed comments: [facility] calling for Pt is at the Pharmacy and waiting for her medication for she is out.  They would like for Dr. Finley to send in Rx as soon as possible.    Phone Number Patient can be reached at: Home number on file [phone] (home)    Best Time: anytime    Can we leave a detailed message on this number? YES    Call taken on 8/16/2018 at 11:09 AM by Barrera Contreras

## 2018-08-16 NOTE — TELEPHONE ENCOUNTER
"Requested Prescriptions   Pending Prescriptions Disp Refills     amLODIPine (NORVASC) 2.5 MG tablet [Pharmacy Med Name: AmLODIPine Besylate 2.5MG TAB]  Last Written Prescription Date:  08/31/17  Last Fill Quantity: 90,  # refills: 3   Last Office Visit with MAURO, KATI or [facility] prescribing provider:  07/26/18   Future Office Visit:    90 tablet 3     Sig: TAKE ONE TABLET BY MOUTH ONCE DAILY    Calcium Channel Blockers Protocol  Failed    8/16/2018  9:06 AM       Failed - Blood pressure under 140/90 in past 12 months    BP Readings from Last 3 Encounters:   08/06/18 174/72   07/26/18 160/64   05/10/18 127/71                Passed - Recent (12 mo) or future (30 days) visit within the authorizing provider's specialty    Patient had office visit in the last 12 months or has a visit in the next 30 days with authorizing provider or within the authorizing provider's specialty.  See \"Patient Info\" tab in inbasket, or \"Choose Columns\" in Meds & Orders section of the refill encounter.           Passed - Patient is age 18 or older       Passed - No active pregnancy on record       Passed - Normal serum creatinine on file in past 12 months    Recent Labs   Lab Test  10/03/17   0728 08/23/17   CR  0.78   --    CRPOC   --   0.9            Passed - No positive pregnancy test in past 12 months          "

## 2018-08-20 RX ORDER — ATORVASTATIN CALCIUM 20 MG/1
TABLET, FILM COATED ORAL
Qty: 90 TABLET | Refills: 2 | Status: SHIPPED | OUTPATIENT
Start: 2018-08-20 | End: 2019-05-02

## 2018-08-20 NOTE — TELEPHONE ENCOUNTER
Prescription approved per [facility] Refill Protocol.    Gabriel Thompson RN, BSN

## 2018-09-11 ENCOUNTER — OFFICE VISIT (OUTPATIENT)
Dept: OPHTHALMOLOGY | Facility: CLINIC | Age: 83
End: 2018-09-11
Payer: COMMERCIAL

## 2018-09-11 DIAGNOSIS — H43.813 PVD (POSTERIOR VITREOUS DETACHMENT), BILATERAL: ICD-10-CM

## 2018-09-11 DIAGNOSIS — H52.4 PRESBYOPIA: ICD-10-CM

## 2018-09-11 DIAGNOSIS — Z96.1 PSEUDOPHAKIA: Primary | ICD-10-CM

## 2018-09-11 DIAGNOSIS — H02.889 MGD (MEIBOMIAN GLAND DISEASE), UNSPECIFIED LATERALITY: ICD-10-CM

## 2018-09-11 DIAGNOSIS — H35.371 MACULAR PUCKER, RIGHT EYE: ICD-10-CM

## 2018-09-11 DIAGNOSIS — H01.029 SQUAMOUS BLEPHARITIS, UNSPECIFIED LATERALITY: ICD-10-CM

## 2018-09-11 LAB
DLCOUNC-%PRED-PRE: 75 %
DLCOUNC-PRE: 13.2 ML/MIN/MMHG
DLCOUNC-PRED: 17.41 ML/MIN/MMHG
ERV-%PRED-PRE: 142 %
ERV-PRE: 0.84 L
ERV-PRED: 0.59 L
EXPTIME-PRE: 6.81 SEC
FEF2575-%PRED-POST: 83 %
FEF2575-%PRED-PRE: 76 %
FEF2575-POST: 1.08 L/SEC
FEF2575-PRE: 0.99 L/SEC
FEF2575-PRED: 1.29 L/SEC
FEFMAX-%PRED-PRE: 153 %
FEFMAX-PRE: 5.54 L/SEC
FEFMAX-PRED: 3.62 L/SEC
FEV1-%PRED-PRE: 119 %
FEV1-PRE: 1.91 L
FEV1FEV6-PRE: 63 %
FEV1FEV6-PRED: 77 %
FEV1FVC-PRE: 62 %
FEV1FVC-PRED: 72 %
FEV1SVC-PRE: 72 %
FEV1SVC-PRED: 69 %
FIFMAX-PRE: 3.83 L/SEC
FRCPLETH-%PRED-PRE: 152 %
FRCPLETH-PRE: 3.91 L
FRCPLETH-PRED: 2.56 L
FVC-%PRED-PRE: 144 %
FVC-PRE: 3.06 L
FVC-PRED: 2.12 L
IC-%PRED-PRE: 104 %
IC-PRE: 1.8 L
IC-PRED: 1.73 L
RVPLETH-%PRED-PRE: 139 %
RVPLETH-PRE: 3.07 L
RVPLETH-PRED: 2.2 L
TLCPLETH-%PRED-PRE: 128 %
TLCPLETH-PRE: 5.72 L
TLCPLETH-PRED: 4.44 L
VA-%PRED-PRE: 93 %
VA-PRE: 4.29 L
VC-%PRED-PRE: 114 %
VC-PRE: 2.65 L
VC-PRED: 2.32 L

## 2018-09-11 PROCEDURE — 92015 DETERMINE REFRACTIVE STATE: CPT | Performed by: STUDENT IN AN ORGANIZED HEALTH CARE EDUCATION/TRAINING PROGRAM

## 2018-09-11 PROCEDURE — 92014 COMPRE OPH EXAM EST PT 1/>: CPT | Performed by: STUDENT IN AN ORGANIZED HEALTH CARE EDUCATION/TRAINING PROGRAM

## 2018-09-11 ASSESSMENT — TONOMETRY
OD_IOP_MMHG: 21
IOP_METHOD: APPLANATION
OS_IOP_MMHG: 17

## 2018-09-11 ASSESSMENT — REFRACTION_WEARINGRX
OD_AXIS: 180
OS_CYLINDER: +2.25
OD_SPHERE: -1.25
SPECS_TYPE: SVL
OS_SPHERE: -1.75
OD_CYLINDER: +4.00
OS_AXIS: 168

## 2018-09-11 ASSESSMENT — VISUAL ACUITY
OS_CC: 20/40
CORRECTION_TYPE: GLASSES
METHOD: SNELLEN - LINEAR
OS_SC: 8-
OD_SC: 10-
OD_PH_CC: 40-1
OS_CC+: +2
OD_CC: 20/40
OD_CC+: -2

## 2018-09-11 ASSESSMENT — REFRACTION_MANIFEST
OS_ADD: +3.00
OD_ADD: +3.00
OD_SPHERE: -1.50
OS_SPHERE: -2.50
OD_CYLINDER: +4.00
OS_CYLINDER: +3.00
OS_AXIS: 168
OD_AXIS: 010

## 2018-09-11 ASSESSMENT — CUP TO DISC RATIO
OS_RATIO: 0.7
OD_RATIO: 0.7

## 2018-09-11 ASSESSMENT — CONF VISUAL FIELD
OD_NORMAL: 1
OS_NORMAL: 1

## 2018-09-11 ASSESSMENT — EXTERNAL EXAM - RIGHT EYE: OD_EXAM: NORMAL

## 2018-09-11 ASSESSMENT — EXTERNAL EXAM - LEFT EYE: OS_EXAM: NORMAL

## 2018-09-11 NOTE — LETTER
"    9/11/2018         RE: Diann John  [address] [facility] [address]        Dear Colleague,    Thank you for referring your patient, Diann John, to the [facility].    Her eye exam is stable.   Please see a copy of my visit note below.     Current Eye Medications:  Baby shampoo eyelid scrubs every evening, erythromycin ointment every evening.  No eye drops.  She takes fish oil.       Subjective:  Comprehensive Eye Exam.  She continues to have some watering, but her eyes are no longer hurting.  Her glasses are working well - she wears them for driving only.  Reading vision is good without correction.       Objective:  See Ophthalmology Exam.       Assessment:  Diann John is a 87 year old female who presents with:   Encounter Diagnoses   Name Primary?     Pseudophakia OU      Macular pucker, right eye      PVD (posterior vitreous detachment), bilateral      Squamous blepharitis, unspecified laterality      MGD (meibomian gland disease), unspecified laterality        Plan:  Continue cleaning lid with baby shampoo daily  Continue using erythromycin ointment at bedtime both eyes  Use artificial tears up to 4 times per day (Refresh Plus, Systane Balance, or generic artificial tears are ok. Avoid \"get the red out\" drops).   Continue fish oil supplement daily  Glasses prescription given - optional    Lisa Page MD  [phone]        Again, thank you for allowing me to participate in the care of your patient.        Sincerely,        Lisa Page MD    "

## 2018-09-11 NOTE — PATIENT INSTRUCTIONS
"Continue cleaning lid with baby shampoo daily  Continue using erythromycin ointment at bedtime both eyes  Use artificial tears up to 4 times per day (Refresh Plus, Systane Balance, or generic artificial tears are ok. Avoid \"get the red out\" drops).   Continue fish oil supplement daily  Glasses prescription given - optional    Lisa Page MD  [phone]    "

## 2018-09-11 NOTE — PROGRESS NOTES
" Current Eye Medications:  Baby shampoo eyelid scrubs every evening, erythromycin ointment every evening.  No eye drops.  She takes fish oil.       Subjective:  Comprehensive Eye Exam.  She continues to have some watering, but her eyes are no longer hurting.  Her glasses are working well - she wears them for driving only.  Reading vision is good without correction.       Objective:  See Ophthalmology Exam.       Assessment:  Diann John is a 87 year old female who presents with:   Encounter Diagnoses   Name Primary?     Pseudophakia OU      Macular pucker, right eye      PVD (posterior vitreous detachment), bilateral      Squamous blepharitis, unspecified laterality      MGD (meibomian gland disease), unspecified laterality        Plan:  Continue cleaning lid with baby shampoo daily  Continue using erythromycin ointment at bedtime both eyes  Use artificial tears up to 4 times per day (Refresh Plus, Systane Balance, or generic artificial tears are ok. Avoid \"get the red out\" drops).   Continue fish oil supplement daily  Glasses prescription given - optional    Lisa Page MD  [phone]      "

## 2018-09-11 NOTE — MR AVS SNAPSHOT
"              After Visit Summary   9/11/2018    Diann John    MRN: [id number]           Patient Information     Date Of Birth          7/31/1931        Visit Information        Provider Department      9/11/2018 10:00 AM Lisa Page MD [facility]        Today's Diagnoses     Pseudophakia OU    -  1    Macular pucker, right eye        PVD (posterior vitreous detachment), bilateral        Squamous blepharitis, unspecified laterality        MGD (meibomian gland disease), unspecified laterality        Presbyopia          Care Instructions    Continue cleaning lid with baby shampoo daily  Continue using erythromycin ointment at bedtime both eyes  Use artificial tears up to 4 times per day (Refresh Plus, Systane Balance, or generic artificial tears are ok. Avoid \"get the red out\" drops).   Continue fish oil supplement daily  Glasses prescription given - optional    Lisa SHEPPARD. Kaylee OVERTON  [phone]            Follow-ups after your visit        Follow-up notes from your care team     Return in about 1 year (around 9/11/2019) for Complete Exam.      Your next 10 appointments already scheduled     Dec 17, 2018  9:30 AM CST   Return Visit with Luis Gray MD   [facility] ([facility])    [address] [place] [id number] [phone]              Who to contact     If you have questions or need follow up information about today's clinic visit or your schedule please contact [facility] directly at [phone].  Normal or non-critical lab and imaging results will be communicated to you by MyChart, letter or phone within 4 business days after the clinic has received the results. If you do not hear from us within 7 days, please contact the clinic through MyChart or phone. If you have a critical or abnormal lab result, we will notify you by phone as soon as possible.  Submit refill requests through [employer] or call your pharmacy and " they will forward the refill request to us. Please allow 3 business days for your refill to be completed.          Additional Information About Your Visit        Care EveryWhere ID     This is your Care EveryWhere ID. This could be used by other organizations to access your [facility] medical records  [id number]        Your Vitals Were     Last Period                   05/10/1978 (Approximate)            Blood Pressure from Last 3 Encounters:   08/06/18 174/72   07/26/18 160/64   05/10/18 127/71    Weight from Last 3 Encounters:   08/06/18 53.1 kg (117 lb 1.6 oz)   07/26/18 52.6 kg (116 lb)   05/10/18 52.6 kg (116 lb)              Today, you had the following     No orders found for display       Primary Care Provider Office Phone # Fax #    Esmer Krysten Finley [phone]       [address]  [facility] [address]        Equal Access to Services     [facility]: Hadii aad ku hadasho Soomaali, waaxda luqadaha, qaybta kaalmada adeegyada, waxay rajwinder hayyong torres . So [facility] [phone].    ATENCIÓN: Si habla español, tiene a moreno disposición servicios gratuitos de asistencia lingüística. Llame al [phone].    We comply with applicable federal civil rights laws and [place] laws. We do not discriminate on the basis of race, color, national origin, age, disability, sex, sexual orientation, or gender identity.            Thank you!     Thank you for choosing [facility]  for your care. Our goal is always to provide you with excellent care. Hearing back from our patients is one way we can continue to improve our services. Please take a few minutes to complete the written survey that you may receive in the mail after your visit with us. Thank you!             Your Updated Medication List - Protect others around you: Learn how to safely use, store and throw away your medicines at www.disposemymeds.org.          This list is accurate as of 9/11/18 10:54 AM.  Always use  your most recent med list.                   Brand Name Dispense Instructions for use Diagnosis    albuterol 108 (90 Base) MCG/ACT inhaler    PROAIR HFA/PROVENTIL HFA/VENTOLIN HFA    1 Inhaler    Inhale 2 puffs into the lungs every 6 hours as needed for shortness of breath / dyspnea or wheezing    Chronic obstructive pulmonary disease, unspecified COPD type (H)       amLODIPine 2.5 MG tablet    NORVASC    90 tablet    TAKE ONE TABLET BY MOUTH ONCE DAILY    Essential hypertension with goal blood pressure less than 140/90       ascorbic acid 1000 MG Tabs    vitamin C     Take 1,000 mg by mouth daily        aspirin 81 MG tablet     30 tablet    Take 1 tablet (81 mg) by mouth daily        atorvastatin 20 MG tablet    LIPITOR    90 tablet    TAKE ONE TABLET BY MOUTH ONCE DAILY    Carotid artery stenosis, asymptomatic, left       CALCIUM + D PO      Take 1 tablet by mouth daily.        erythromycin ophthalmic ointment    ROMYCIN    1 Tube    Place 1 Application into both eyes At Bedtime Apply small (1/4 inch) strip to affected eye(s)    Squamous blepharitis, unspecified laterality       fluticasone 50 MCG/ACT spray    FLONASE    1 Bottle    Spray 1-2 sprays into both nostrils daily    Seasonal allergic rhinitis, unspecified allergic rhinitis trigger       MULTI-VITAMIN PO      Take 1 tablet by mouth daily.        omega-3 fatty acids 1200 MG capsule      Take 1 capsule by mouth daily        order for DME     1 Device    Equipment being ordered: BP cuff/machine    Hypertension goal BP (blood pressure) < 140/90       VAQTA 50 UNIT/ML injection   Generic drug:  hepatitis A vaccine      Inject 50 Units into the muscle once Reported on 4/4/2017

## 2018-09-24 ENCOUNTER — TELEPHONE (OUTPATIENT)
Dept: OPHTHALMOLOGY | Facility: CLINIC | Age: 83
End: 2018-09-24

## 2018-09-24 RX ORDER — ERYTHROMYCIN 5 MG/G
OINTMENT OPHTHALMIC AT BEDTIME
Qty: 1 TUBE | Refills: 3 | Status: SHIPPED | OUTPATIENT
Start: 2018-09-24 | End: 2019-08-05

## 2018-09-24 NOTE — TELEPHONE ENCOUNTER
Called patient, told her I will resend the order for the Erythromycin ointment to Dr. Page.  She will be in this afternoon and I'll have her sign the Rx. Patient has about four days left of ointment.

## 2018-09-24 NOTE — TELEPHONE ENCOUNTER
Reason for call:  Other   Patient called regarding (reason for call): prescription  Additional comments: Diann has called to say that her pharmacy said they have tried to send over a refill request a couple times and no one has responded.  Can someone please see if this can be refilled and let her know.    Phone number to reach patient:  Home number on file [phone] (home)    Best Time:  any    Can we leave a detailed message on this number?  yes

## 2018-10-09 ENCOUNTER — TELEPHONE (OUTPATIENT)
Dept: FAMILY MEDICINE | Facility: CLINIC | Age: 83
End: 2018-10-09

## 2018-10-09 DIAGNOSIS — I65.22 ASYMPTOMATIC STENOSIS OF LEFT CAROTID ARTERY: Primary | ICD-10-CM

## 2018-10-09 NOTE — TELEPHONE ENCOUNTER
..Reason for Call:  Order for ultrasound/carotid    Detailed comments: Patient states its about that time to have an ultrasound / prefers to have it done at [place]     Phone Number Patient can be reached at: Home number on file [phone] (home)    Best Time: anytime    Can we leave a detailed message on this number? YES    Call taken on 10/9/2018 at 2:30 PM by Ning Osullivan

## 2018-10-10 NOTE — TELEPHONE ENCOUNTER
Please call to schedule your carotid ultrasound at [facility] by calling [phone].     Patient's carotid ultrasound has been ordered.  Esmer Finley MD

## 2018-10-10 NOTE — TELEPHONE ENCOUNTER
Called, Diann is notified and patient will call and try to set up an appointment before she sees Dr. Finley on 10/25/18.  Diogenes Mckeon,  For Teams Comfort and Heart

## 2018-10-16 ENCOUNTER — RADIANT APPOINTMENT (OUTPATIENT)
Dept: ULTRASOUND IMAGING | Facility: CLINIC | Age: 83
End: 2018-10-16
Attending: FAMILY MEDICINE
Payer: COMMERCIAL

## 2018-10-16 DIAGNOSIS — I65.22 ASYMPTOMATIC STENOSIS OF LEFT CAROTID ARTERY: ICD-10-CM

## 2018-10-16 PROCEDURE — 93880 EXTRACRANIAL BILAT STUDY: CPT | Performed by: STUDENT IN AN ORGANIZED HEALTH CARE EDUCATION/TRAINING PROGRAM

## 2018-10-16 NOTE — LETTER
October 17, 2018      Diann John  [address] [facility] [address]        Dear Diann John,     Your test results are attached. I am happy to let you know that they are stable and your medications can stay the same.     The narrowing in your left carotid is stable.     Please call me if you have any questions about these test results or about your care.     Sincerely,     Esmer Finley MD     Resulted Orders   US Carotid Bilateral    Narrative    US CAROTID BILATERAL 10/16/2018 8:59 AM    Clinical history: Asymptomatic stenosis of left carotid artery    Comparison Study: 8/14/2017, 9/12/2016    Ordering provider: ESMER FINLEY    Technique:  Study includes gray scale images, color doppler, spectral doppler  waveforms and velocities with appropriate angles of 60 degrees or  less.    Findings:    Right side:     Plaque Morphology: Predominantly echogenic smooth atherosclerotic  plaques most notably around the carotid bifurcation in the distal CCA.          Mid CCA: 106/13 cm/sec         External CA: 80/0 cm/sec       Proximal ICA: 85/14  cm/sec     Mid ICA: 94/21  cm/sec     Distal ICA: 101/25  cm/sec       Vertebral artery: antegrade, 57/5  cm/sec     ICA/CCA ratio: 0.95     Left side:     Plaque Morphology: Predominantly echogenic irregular atherosclerotic  plaque most significantly around the carotid bifurcation in the distal  CCA and proximal internal carotid artery.       Proximal CCA: 91/8 cm/sec     Mid CCA: 70/9 cm/sec     Distal CCA: 66/11 cm/sec        External CA: 112/1 cm/sec       Proximal ICA: 477/87 cm/sec, previously peak systolic velocity of  556 cm/sec on 8/14/2018     Mid ICA: 104/16  cm/sec     Distal ICA: 88/21  cm/sec       Vertebral artery: antegrade, 68/8  cm/sec     ICA/CCA ratio: 6.8       Impression    Impression:  1. Left side:    Degree of stenosis: >/= 70% by ultrasound velocity  criteria, velocity slightly improved since 8/14/2017 exam with peak  velocity  of 477 cm/sec today, previously 556 cm/s.  2. Right side:  Degree of stenosis: Less than 50 % stenosis by  velocity criteria.    Consensus Panel Gray-Scale and Doppler US Criteria for Diagnosis of  ICA Stenosis (Radiology 11/2003) additionally modified by Mavis et  al. in Journal of Vascular Surgery 1/2011, [phone].       Normal         ICA PSV < 140 cm/sec       Plaque Estimate None       ICA/CCA  PSV Ratio < 2.0       ICA EDV < 40 cm/sec       < 50%          ICA PSV < 140 cm/sec       Plaque Estimate < 50%       ICA/CCA  PSV Ratio < 2.0       ICA EDV < 40 cm/sec       50- 69%       ICA -230 cm/sec       Plaque Estimate > or = 50%       ICA/CCA PSV Ratio 2.0-4.0       ICA EDV  cm/sec         > or = 70%, less than near occlusion       ICA PSV > 230 cm/sec       Plaque Estimate > or = 50%       ICA/CCA Ratio > 4.0       ICA EDV > 100 cm/sec                                            Additional criteria from vascular surgery     > 80%       EDV > 120 cm/sec     LEONORA GUAJARDO MD

## 2018-10-17 NOTE — PROGRESS NOTES
Dear Diann John,    Your test results are attached. I am happy to let you know that they are stable and your medications can stay the same.    The narrowing in your left carotid is stable.     Please call me if you have any questions about these test results or about your care.    Sincerely,    Esmer Finley MD

## 2018-10-25 ENCOUNTER — OFFICE VISIT (OUTPATIENT)
Dept: FAMILY MEDICINE | Facility: CLINIC | Age: 83
End: 2018-10-25
Payer: COMMERCIAL

## 2018-10-25 VITALS
DIASTOLIC BLOOD PRESSURE: 68 MMHG | BODY MASS INDEX: 21.03 KG/M2 | RESPIRATION RATE: 16 BRPM | WEIGHT: 111.4 LBS | TEMPERATURE: 97.5 F | SYSTOLIC BLOOD PRESSURE: 132 MMHG | OXYGEN SATURATION: 97 % | HEART RATE: 79 BPM | HEIGHT: 61 IN

## 2018-10-25 DIAGNOSIS — I65.22 ASYMPTOMATIC STENOSIS OF LEFT CAROTID ARTERY: ICD-10-CM

## 2018-10-25 DIAGNOSIS — E78.5 HYPERLIPIDEMIA LDL GOAL <70: ICD-10-CM

## 2018-10-25 DIAGNOSIS — Z23 NEED FOR PROPHYLACTIC VACCINATION AND INOCULATION AGAINST INFLUENZA: ICD-10-CM

## 2018-10-25 DIAGNOSIS — I10 HYPERTENSION GOAL BP (BLOOD PRESSURE) < 140/90: Primary | ICD-10-CM

## 2018-10-25 DIAGNOSIS — J31.0 CHRONIC RHINITIS: ICD-10-CM

## 2018-10-25 DIAGNOSIS — J43.9 PULMONARY EMPHYSEMA, UNSPECIFIED EMPHYSEMA TYPE (H): ICD-10-CM

## 2018-10-25 DIAGNOSIS — R91.8 PULMONARY NODULES: ICD-10-CM

## 2018-10-25 LAB
ANION GAP SERPL CALCULATED.3IONS-SCNC: 7 MMOL/L (ref 3–14)
BUN SERPL-MCNC: 16 MG/DL (ref 7–30)
CALCIUM SERPL-MCNC: 8.8 MG/DL (ref 8.5–10.1)
CHLORIDE SERPL-SCNC: 107 MMOL/L (ref 94–109)
CHOLEST SERPL-MCNC: 129 MG/DL
CO2 SERPL-SCNC: 27 MMOL/L (ref 20–32)
CREAT SERPL-MCNC: 0.71 MG/DL (ref 0.52–1.04)
CREAT UR-MCNC: 131 MG/DL
ERYTHROCYTE [DISTWIDTH] IN BLOOD BY AUTOMATED COUNT: 12.6 % (ref 10–15)
GFR SERPL CREATININE-BSD FRML MDRD: 78 ML/MIN/1.7M2
GLUCOSE SERPL-MCNC: 87 MG/DL (ref 70–99)
HCT VFR BLD AUTO: 40.2 % (ref 35–47)
HDLC SERPL-MCNC: 61 MG/DL
HGB BLD-MCNC: 13.2 G/DL (ref 11.7–15.7)
LDLC SERPL CALC-MCNC: 57 MG/DL
MCH RBC QN AUTO: 31.5 PG (ref 26.5–33)
MCHC RBC AUTO-ENTMCNC: 32.8 G/DL (ref 31.5–36.5)
MCV RBC AUTO: 96 FL (ref 78–100)
MICROALBUMIN UR-MCNC: 16 MG/L
MICROALBUMIN/CREAT UR: 12.29 MG/G CR (ref 0–25)
NONHDLC SERPL-MCNC: 68 MG/DL
PLATELET # BLD AUTO: 187 10E9/L (ref 150–450)
POTASSIUM SERPL-SCNC: 3.9 MMOL/L (ref 3.4–5.3)
RBC # BLD AUTO: 4.19 10E12/L (ref 3.8–5.2)
SODIUM SERPL-SCNC: 141 MMOL/L (ref 133–144)
TRIGL SERPL-MCNC: 54 MG/DL
WBC # BLD AUTO: 5.3 10E9/L (ref 4–11)

## 2018-10-25 PROCEDURE — 82043 UR ALBUMIN QUANTITATIVE: CPT | Performed by: FAMILY MEDICINE

## 2018-10-25 PROCEDURE — 80048 BASIC METABOLIC PNL TOTAL CA: CPT | Performed by: FAMILY MEDICINE

## 2018-10-25 PROCEDURE — 80061 LIPID PANEL: CPT | Performed by: FAMILY MEDICINE

## 2018-10-25 PROCEDURE — 99214 OFFICE O/P EST MOD 30 MIN: CPT | Mod: 25 | Performed by: FAMILY MEDICINE

## 2018-10-25 PROCEDURE — G0008 ADMIN INFLUENZA VIRUS VAC: HCPCS | Performed by: FAMILY MEDICINE

## 2018-10-25 PROCEDURE — 85027 COMPLETE CBC AUTOMATED: CPT | Performed by: FAMILY MEDICINE

## 2018-10-25 PROCEDURE — 36415 COLL VENOUS BLD VENIPUNCTURE: CPT | Performed by: FAMILY MEDICINE

## 2018-10-25 PROCEDURE — 90662 IIV NO PRSV INCREASED AG IM: CPT | Performed by: FAMILY MEDICINE

## 2018-10-25 RX ORDER — IPRATROPIUM BROMIDE 42 UG/1
1 SPRAY, METERED NASAL 4 TIMES DAILY PRN
Qty: 1 BOX | Refills: 3 | Status: SHIPPED | OUTPATIENT
Start: 2018-10-25 | End: 2019-10-18

## 2018-10-25 ASSESSMENT — PAIN SCALES - GENERAL: PAINLEVEL: NO PAIN (0)

## 2018-10-25 NOTE — MR AVS SNAPSHOT
After Visit Summary   10/25/2018    Diann John    MRN: [id number]           Patient Information     Date Of Birth          7/31/1931        Visit Information        Provider Department      10/25/2018 10:20 AM Esmer Finley MD [facility]        Today's Diagnoses     Need for prophylactic vaccination and inoculation against influenza    -  1    Hypertension goal BP (blood pressure) < 140/90        Asymptomatic stenosis of left carotid artery        Hyperlipidemia LDL goal <70        Chronic rhinitis          Care Instructions    At [facility], we strive to deliver an exceptional experience to you, every time we see you.  If you receive a survey in the mail, please send us back your thoughts. We really do value your feedback.    Your care team:                            Family Medicine Internal Medicine   MD Ghulam Santos MD Shantel Branch-Fleming, MD Katya Georgiev PA-C Megan Hill, APRN [facility]    Roderick Hager MD Pediatrics   Chidi Oakley, PAVeronicaC  Jennie Maynard, CNP MD Lily Hoffman APRN CNP   MD Jaimie Stockton MD Deborah Mielke, MD Kim Thein, APRN CNP      Clinic hours: Monday - Thursday 7 am-7 pm; Fridays 7 am-5 pm.   Urgent care: Monday - Friday 11 am-9 pm; Saturday and Sunday 9 am-5 pm.  Pharmacy : Monday -Thursday 8 am-8 pm; Friday 8 am-6 pm; Saturday and Sunday 9 am-5 pm.     Clinic: [phone]   Pharmacy: [phone]        Established High Blood Pressure    High blood pressure (hypertension) is a chronic disease. Often, healthcare providers don t know what causes it. But it can be caused by certain health conditions and medicines.  If you have high blood pressure, you may not have any symptoms. If you do have symptoms, they may include headache, dizziness, changes in your vision, chest pain, and shortness of breath. But even without symptoms, high blood pressure that s not treated  raises your risk for heart attack, heart failure, and stroke. High blood pressure is a serious health risk and shouldn t be ignored.  Blood pressure measurements are given as 2 numbers. Systolic blood pressure is the upper number. This is the pressure when the heart contracts. Diastolic blood pressure is the lower number. This is the pressure when the heart relaxes between beats. You will see your blood pressure readings written together. For example, a person with a systolic pressure of 118 and a diastolic pressure of 78 will have 118/78 written in the medical record.  Blood pressure is categorized as normal, elevated, or stage 1 or stage 2 high blood pressure:    Normal blood pressure is systolic of less than 120 and diastolic of less than 80 (120/80)    Elevated blood pressure is systolic of 120 to 129 and diastolic less than 80    Stage 1 high blood pressure is systolic is 130 to 139 or diastolic between 80 to 89    Stage 2 high blood pressure is when systolic is 140 or higher or the diastolic is 90 or higher  Home care  If you have high blood pressure, follow these home care guidelines to help lower your blood pressure. If you are taking medicines for high blood pressure, these methods may reduce or end your need for medicines in the future.    Start a weight-loss program if you are overweight.    Cut back on how much salt you get in your diet. Here s how to do this:  ? Don t eat foods that have a lot of salt. These include olives, pickles, smoked meats, and salted potato chips.  ? Don t add salt to your food at the table.  ? Use only small amounts of salt when cooking.    Start an exercise program. Talk with your healthcare provider about the type of exercise program that would be best for you. It doesn't have to be hard. Even brisk walking for 20 minutes 3 times a week is a good form of exercise.    Don t take medicines that stimulate the heart. This includes many over-the-counter cold and sinus decongestant  pills and sprays, as well as diet pills. Check the warnings about high blood pressure on the label. Before buying any over-the-counter medicines or supplements, always ask the pharmacist about the product's potential interaction with your high blood pressure and your high blood pressure medicines.    Stimulants such as amphetamine or cocaine could be deadly for someone with high blood pressure. Never take these.    Limit how much caffeine you get in your diet. Switch to caffeine-free products.    Stop smoking. If you are a long-time smoker, this can be hard. Talk to your healthcare provider about medicines and nicotine replacement options to help you. Also, enroll in a stop-smoking program to make it more likely that you will quit for good.    Learn how to handle stress. This is an important part of any program to lower blood pressure. Learn about relaxation methods like meditation, yoga, or biofeedback.    If your provider prescribed medicines, take them exactly as directed. Missing doses may cause your blood pressure get out of control.    If you miss a dose or doses, check with your healthcare provider or pharmacist about what to do.    Consider buying an automatic blood pressure machine to check your blood pressure at home. Ask your provider for a recommendation. You can get one of these at most pharmacies.     The American Heart Association recommends the following guidelines for home blood pressure monitoring:    Don't smoke or drink coffee for 30 minutes before taking your blood pressure.    Go to the bathroom before the test.    Relax for 5 minutes before taking the measurement.    Sit with your back supported (don't sit on a couch or soft chair); keep your feet on the floor uncrossed. Place your arm on a solid flat surface (like a table) with the upper part of the arm at heart level. Place the middle of the cuff directly above the bend of the elbow. Check the monitor's instruction manual for an  illustration.    Take multiple readings. When you measure, take 2 to 3 readings one minute apart and record all of the results.    Take your blood pressure at the same time every day, or as your healthcare provider recommends.    Record the date, time, and blood pressure reading.    Take the record with you to your next medical appointment. If your blood pressure monitor has a built-in memory, simply take the monitor with you to your next appointment.    Call your provider if you have several high readings. Don't be frightened by a single high blood pressure reading, but if you get several high readings, check in with your healthcare provider.    Note: When blood pressure reaches a systolic (top number) of 180 or higher OR diastolic (bottom number) of 110 or higher, seek emergency medical treatment.  Follow-up care  You will need to see your healthcare provider regularly. This is to check your blood pressure and to make changes to your medicines. Make a follow-up appointment as directed. Bring the record of your home blood pressure readings to the appointment.  When to seek medical advice  Call your healthcare provider right away if any of these occur:    Blood pressure reaches a systolic (upper number) of 180 or higher OR a diastolic (bottom number) of 110 or higher    Chest pain or shortness of breath    Severe headache    Throbbing or rushing sound in the ears    Nosebleed    Sudden severe pain in your belly (abdomen)    Extreme drowsiness, confusion, or fainting    Dizziness or spinning sensation (vertigo)    Weakness of an arm or leg or one side of the face    You have problems speaking or seeing   Date Last Reviewed: 12/1/2016 2000-2017 [employer]. [address] [place] [address], [place] [address]. All rights reserved. This information is not intended as a substitute for professional medical care. Always follow your healthcare professional's instructions.                Follow-ups after your visit    "     Follow-up notes from your care team     Return in about 6 months (around 4/25/2019) for medication follow up, Physical Exam, Lab Work.      Your next 10 appointments already scheduled     Dec 17, 2018  9:30 AM CST   Return Visit with Luis Gray MD   [facility] ([facility]    [address] [address] [place] [id number] [phone]              Who to contact     If you have questions or need follow up information about today's clinic visit or your schedule please contact [facility] directly at [phone].  Normal or non-critical lab and imaging results will be communicated to you by MyChart, letter or phone within 4 business days after the clinic has received the results. If you do not hear from us within 7 days, please contact the clinic through MyChart or phone. If you have a critical or abnormal lab result, we will notify you by phone as soon as possible.  Submit refill requests through [employer] ([employer]) or call your pharmacy and they will forward the refill request to us. Please allow 3 business days for your refill to be completed.          Additional Information About Your Visit        Care EveryWhere ID     This is your Care EveryWhere ID. This could be used by other organizations to access your [place] medical records  [id number]        Your Vitals Were     Pulse Temperature Respirations Height Last Period Pulse Oximetry    79 97.5  F (36.4  C) (Oral) 16 5' 1\" (1.549 m) 05/10/1978 (Approximate) 97%    Breastfeeding? BMI (Body Mass Index)                No 21.05 kg/m2           Blood Pressure from Last 3 Encounters:   10/25/18 132/68   08/06/18 174/72   07/26/18 160/64    Weight from Last 3 Encounters:   10/25/18 111 lb 6.4 oz (50.5 kg)   08/06/18 117 lb 1.6 oz (53.1 kg)   07/26/18 116 lb (52.6 kg)              We Performed the Following     Albumin Random Urine Quantitative with Creat Ratio     BASIC METABOLIC PANEL     CBC with platelets     " FLU VACCINE, INCREASED ANTIGEN, PRESV FREE, AGE 65+ [51437]     Lipid panel reflex to direct LDL Fasting     Vaccine Administration, Initial [57043]          Today's Medication Changes          These changes are accurate as of 10/25/18 11:11 AM.  If you have any questions, ask your nurse or doctor.               Start taking these medicines.        Dose/Directions    ipratropium 0.06 % spray   Commonly known as:  ATROVENT   Used for:  Chronic rhinitis   Started by:  Esmer Finley MD        Dose:  1 spray   Spray 1 spray into both nostrils 4 times daily as needed for rhinitis   Quantity:  1 Box   Refills:  3         Stop taking these medicines if you haven't already. Please contact your care team if you have questions.     MULTI-VITAMIN PO   Stopped by:  Esmer Finley MD                Where to get your medicines      These medications were sent to [facility] - [place], [place] - [address]  [address], [facility] [address]     Phone:  [phone]     ipratropium 0.06 % spray                Primary Care Provider Office Phone # Fax #    Esmer Finley [phone]       [address]  [facility] [address]        Equal Access to Services     JOLLY VIRGEN AH: Hadii aad ku hadasho Soomaali, waaxda luqadaha, qaybta kaalmada adeegyada, waxay idiin hayaan adeeg marta lavioleta dubois. So [facility] [phone].    ATENCIÓN: Si habla español, tiene a moreno disposición servicios gratuitos de asistencia lingüística. Llame al [phone].    We comply with applicable federal civil rights laws and [place] laws. We do not discriminate on the basis of race, color, national origin, age, disability, sex, sexual orientation, or gender identity.            Thank you!     Thank you for choosing [facility]  for your care. Our goal is always to provide you with excellent care. Hearing back from our patients is one way we can continue to improve our  services. Please take a few minutes to complete the written survey that you may receive in the mail after your visit with us. Thank you!             Your Updated Medication List - Protect others around you: Learn how to safely use, store and throw away your medicines at www.disposemymeds.org.          This list is accurate as of 10/25/18 11:11 AM.  Always use your most recent med list.                   Brand Name Dispense Instructions for use Diagnosis    albuterol 108 (90 Base) MCG/ACT inhaler    PROAIR HFA/PROVENTIL HFA/VENTOLIN HFA    1 Inhaler    Inhale 2 puffs into the lungs every 6 hours as needed for shortness of breath / dyspnea or wheezing    Chronic obstructive pulmonary disease, unspecified COPD type (H)       amLODIPine 2.5 MG tablet    NORVASC    90 tablet    TAKE ONE TABLET BY MOUTH ONCE DAILY    Essential hypertension with goal blood pressure less than 140/90       ascorbic acid 1000 MG Tabs    vitamin C     Take 1,000 mg by mouth daily        aspirin 81 MG tablet     30 tablet    Take 1 tablet (81 mg) by mouth daily        atorvastatin 20 MG tablet    LIPITOR    90 tablet    TAKE ONE TABLET BY MOUTH ONCE DAILY    Carotid artery stenosis, asymptomatic, left       CALCIUM + D PO      Take 1 tablet by mouth daily.        erythromycin ophthalmic ointment    ROMYCIN    1 Tube    Place into both eyes At Bedtime Apply small (1/4 inch) strip to affected eye(s)    Squamous blepharitis, unspecified laterality, MGD (meibomian gland disease), unspecified laterality       fluticasone 50 MCG/ACT spray    FLONASE    1 Bottle    Spray 1-2 sprays into both nostrils daily    Seasonal allergic rhinitis, unspecified allergic rhinitis trigger       ipratropium 0.06 % spray    ATROVENT    1 Box    Spray 1 spray into both nostrils 4 times daily as needed for rhinitis    Chronic rhinitis       omega-3 fatty acids 1200 MG capsule      Take 1 capsule by mouth daily        order for DME     1 Device    Equipment being  ordered: BP cuff/machine    Hypertension goal BP (blood pressure) < 140/90       VAQTA 50 UNIT/ML injection   Generic drug:  hepatitis A vaccine      Inject 50 Units into the muscle once Reported on 4/4/2017

## 2018-10-25 NOTE — PATIENT INSTRUCTIONS
At [facility], we strive to deliver an exceptional experience to you, every time we see you.  If you receive a survey in the mail, please send us back your thoughts. We really do value your feedback.    Your care team:                            Family Medicine Internal Medicine   MD Ghulam Santos MD Shantel Branch-Fleming, MD Katya Georgiev PA-C Megan Hill, APRN CNP    Roderick Hager, MD Pediatrics   Chidi Oakley, PANDA Maynard, MD Lily Stanley APRN CNP   MD Jaimie Stockton MD Deborah Mielke, MD Krysten Donohue, APRN [facility]      Clinic hours: Monday - Thursday 7 am-7 pm; Fridays 7 am-5 pm.   Urgent care: Monday - Friday 11 am-9 pm; Saturday and Sunday 9 am-5 pm.  Pharmacy : Monday -Thursday 8 am-8 pm; Friday 8 am-6 pm; Saturday and Sunday 9 am-5 pm.     Clinic: [phone]   Pharmacy: [phone]        Established High Blood Pressure    High blood pressure (hypertension) is a chronic disease. Often, healthcare providers don t know what causes it. But it can be caused by certain health conditions and medicines.  If you have high blood pressure, you may not have any symptoms. If you do have symptoms, they may include headache, dizziness, changes in your vision, chest pain, and shortness of breath. But even without symptoms, high blood pressure that s not treated raises your risk for heart attack, heart failure, and stroke. High blood pressure is a serious health risk and shouldn t be ignored.  Blood pressure measurements are given as 2 numbers. Systolic blood pressure is the upper number. This is the pressure when the heart contracts. Diastolic blood pressure is the lower number. This is the pressure when the heart relaxes between beats. You will see your blood pressure readings written together. For example, a person with a systolic pressure of 118 and a diastolic pressure of 78 will have 118/78 written in the medical record.  Blood  pressure is categorized as normal, elevated, or stage 1 or stage 2 high blood pressure:    Normal blood pressure is systolic of less than 120 and diastolic of less than 80 (120/80)    Elevated blood pressure is systolic of 120 to 129 and diastolic less than 80    Stage 1 high blood pressure is systolic is 130 to 139 or diastolic between 80 to 89    Stage 2 high blood pressure is when systolic is 140 or higher or the diastolic is 90 or higher  Home care  If you have high blood pressure, follow these home care guidelines to help lower your blood pressure. If you are taking medicines for high blood pressure, these methods may reduce or end your need for medicines in the future.    Start a weight-loss program if you are overweight.    Cut back on how much salt you get in your diet. Here s how to do this:  ? Don t eat foods that have a lot of salt. These include olives, pickles, smoked meats, and salted potato chips.  ? Don t add salt to your food at the table.  ? Use only small amounts of salt when cooking.    Start an exercise program. Talk with your healthcare provider about the type of exercise program that would be best for you. It doesn't have to be hard. Even brisk walking for 20 minutes 3 times a week is a good form of exercise.    Don t take medicines that stimulate the heart. This includes many over-the-counter cold and sinus decongestant pills and sprays, as well as diet pills. Check the warnings about high blood pressure on the label. Before buying any over-the-counter medicines or supplements, always ask the pharmacist about the product's potential interaction with your high blood pressure and your high blood pressure medicines.    Stimulants such as amphetamine or cocaine could be deadly for someone with high blood pressure. Never take these.    Limit how much caffeine you get in your diet. Switch to caffeine-free products.    Stop smoking. If you are a long-time smoker, this can be hard. Talk to your  healthcare provider about medicines and nicotine replacement options to help you. Also, enroll in a stop-smoking program to make it more likely that you will quit for good.    Learn how to handle stress. This is an important part of any program to lower blood pressure. Learn about relaxation methods like meditation, yoga, or biofeedback.    If your provider prescribed medicines, take them exactly as directed. Missing doses may cause your blood pressure get out of control.    If you miss a dose or doses, check with your healthcare provider or pharmacist about what to do.    Consider buying an automatic blood pressure machine to check your blood pressure at home. Ask your provider for a recommendation. You can get one of these at most pharmacies.     The American Heart Association recommends the following guidelines for home blood pressure monitoring:    Don't smoke or drink coffee for 30 minutes before taking your blood pressure.    Go to the bathroom before the test.    Relax for 5 minutes before taking the measurement.    Sit with your back supported (don't sit on a couch or soft chair); keep your feet on the floor uncrossed. Place your arm on a solid flat surface (like a table) with the upper part of the arm at heart level. Place the middle of the cuff directly above the bend of the elbow. Check the monitor's instruction manual for an illustration.    Take multiple readings. When you measure, take 2 to 3 readings one minute apart and record all of the results.    Take your blood pressure at the same time every day, or as your healthcare provider recommends.    Record the date, time, and blood pressure reading.    Take the record with you to your next medical appointment. If your blood pressure monitor has a built-in memory, simply take the monitor with you to your next appointment.    Call your provider if you have several high readings. Don't be frightened by a single high blood pressure reading, but if you get  several high readings, check in with your healthcare provider.    Note: When blood pressure reaches a systolic (top number) of 180 or higher OR diastolic (bottom number) of 110 or higher, seek emergency medical treatment.  Follow-up care  You will need to see your healthcare provider regularly. This is to check your blood pressure and to make changes to your medicines. Make a follow-up appointment as directed. Bring the record of your home blood pressure readings to the appointment.  When to seek medical advice  Call your healthcare provider right away if any of these occur:    Blood pressure reaches a systolic (upper number) of 180 or higher OR a diastolic (bottom number) of 110 or higher    Chest pain or shortness of breath    Severe headache    Throbbing or rushing sound in the ears    Nosebleed    Sudden severe pain in your belly (abdomen)    Extreme drowsiness, confusion, or fainting    Dizziness or spinning sensation (vertigo)    Weakness of an arm or leg or one side of the face    You have problems speaking or seeing   Date Last Reviewed: 12/1/2016 2000-2017 [employer]. [address] [place] [address], [place] [address]. All rights reserved. This information is not intended as a substitute for professional medical care. Always follow your healthcare professional's instructions.

## 2018-10-25 NOTE — PROGRESS NOTES
SUBJECTIVE:   Diann John is a 87 year old female who presents to clinic today for the following health issues:      Hyperlipidemia Follow-Up      Rate your low fat/cholesterol diet?: good    Taking statin?  Yes, no muscle aches from statin    Other lipid medications/supplements?:  Fish oil/Omega 3, dose noin without side effects    Hypertension Follow-up      Outpatient blood pressures are being checked at home.  Results are 124/122.    Low Salt Diet: low salt      Amount of exercise or physical activity: 2-3 days/week for an average of 45-60 minutes    Problems taking medications regularly: No    Medication side effects: none    Diet: regular (no restrictions) and limit dairy product            Problem list and histories reviewed & adjusted, as indicated.  Additional history: as documented    Patient Active Problem List   Diagnosis     Macular pucker, right eye     Pseudophakia OU     Blepharitis     Hypertension goal BP (blood pressure) < 140/90     Seasonal allergies     Carotid artery stenosis, asymptomatic     PVD (posterior vitreous detachment) OU     Advanced directives, counseling/discussion     ASVD (arteriosclerotic vascular disease)     Hyperlipidemia LDL goal <70     Chronic rhinitis     Pulmonary nodules     Past Surgical History:   Procedure Laterality Date     APPENDECTOMY       C RAD RESEC TONSIL/PILLARS      Adeniods     CATARACT IOL, RT/LT Right      HAND SURGERY       NOSE SURGERY         Social History   Substance Use Topics     Smoking status: Former Smoker     Quit date: 1/1/1970     Smokeless tobacco: Never Used     Alcohol use No     Family History   Problem Relation Age of Onset     Cancer Father      Glaucoma Father      Cancer Mother      Hypertension Mother      Hypertension Sister      Cerebrovascular Disease Sister      Thyroid Disease No family hx of      Macular Degeneration No family hx of          Current Outpatient Prescriptions   Medication Sig Dispense Refill     albuterol  (PROAIR HFA/PROVENTIL HFA/VENTOLIN HFA) 108 (90 Base) MCG/ACT Inhaler Inhale 2 puffs into the lungs every 6 hours as needed for shortness of breath / dyspnea or wheezing 1 Inhaler 0     amLODIPine (NORVASC) 2.5 MG tablet TAKE ONE TABLET BY MOUTH ONCE DAILY 90 tablet 3     ascorbic acid (VITAMIN C) 1000 MG TABS Take 1,000 mg by mouth daily       aspirin 81 MG tablet Take 1 tablet (81 mg) by mouth daily 30 tablet      atorvastatin (LIPITOR) 20 MG tablet TAKE ONE TABLET BY MOUTH ONCE DAILY 90 tablet 2     Calcium-Vitamin D (CALCIUM + D PO) Take 1 tablet by mouth daily.       erythromycin (ROMYCIN) ophthalmic ointment Place into both eyes At Bedtime Apply small (1/4 inch) strip to affected eye(s) 1 Tube 3     fluticasone (FLONASE) 50 MCG/ACT spray Spray 1-2 sprays into both nostrils daily 1 Bottle 11     ipratropium (ATROVENT) 0.06 % spray Spray 1 spray into both nostrils 4 times daily as needed for rhinitis 1 Box 3     omega-3 fatty acids (FISH OIL) 1200 MG capsule Take 1 capsule by mouth daily       order for DME Equipment being ordered: BP cuff/machine 1 Device 0     VAQTA 50 UNIT/ML injection Inject 50 Units into the muscle once Reported on 4/4/2017       Allergies   Allergen Reactions     Codeine Other (See Comments)     Ottoniel     Lisinopril Cough     Recent Labs   Lab Test  10/03/17   0728  09/11/17   1522  03/16/17   0813  09/26/16   1206  03/07/16   1555  03/16/15   1007   08/15/13   0859 10/19/12   LDL  43   --    --   74   --   106   < >  123   --    HDL  68   --    --   57   --   72   < >  59   --    TRIG  58   --    --   60   --   77   < >  99   --    ALT   --    --    --    --   33   --    --   32   --    CR  0.78   --   0.80   --   0.83  0.73   < >  0.76   --    GFRESTIMATED  70  68  68   --   65  77   < >  73   --    GFRESTBLACK  85  82  82   --   79  >90   GFR Calc     < >  88   --    POTASSIUM  4.0   --   4.0   --   3.7  3.5   < >  3.8   --    TSH   --    --    --    --    --    --     "--   3.53  2.18    < > = values in this interval not displayed.      BP Readings from Last 3 Encounters:   10/25/18 132/68   08/06/18 174/72   07/26/18 160/64    Wt Readings from Last 3 Encounters:   10/25/18 111 lb 6.4 oz (50.5 kg)   08/06/18 117 lb 1.6 oz (53.1 kg)   07/26/18 116 lb (52.6 kg)                  Labs reviewed in EPIC    Reviewed and updated as needed this visit by clinical staff       Reviewed and updated as needed this visit by Provider         ROS:  Constitutional, HEENT, cardiovascular, pulmonary, gi and gu systems are negative, except as otherwise noted.    OBJECTIVE:     /68 (BP Location: Left arm, Patient Position: Chair, Cuff Size: Adult Regular)  Pulse 79  Temp 97.5  F (36.4  C) (Oral)  Resp 16  Ht 5' 1\" (1.549 m)  Wt 111 lb 6.4 oz (50.5 kg)  LMP 05/10/1978 (Approximate)  SpO2 97%  Breastfeeding? No  BMI 21.05 kg/m2  Body mass index is 21.05 kg/(m^2).  GENERAL: elderly, alert, well nourished, well hydrated, no distress, walks without use of assistive devise, able to get on exam table without assistance.   HENT: ear canals- normal; TMs- normal; Nose- normal; Mouth- no ulcers, no lesions, missing dentition  NECK: no tenderness, no adenopathy, no asymmetry, no masses, no stiffness; thyroid- normal to palpation  RESP: lungs clear to auscultation - no rales, no rhonchi, no wheezes  CV: regular rates and rhythm, normal S1 S2, no S3 or S4 and no murmur, no click or rub, normal pulses  ABDOMEN: soft, no tenderness, no  hepatosplenomegaly, no masses, normal bowel sounds  MS: extremities- no gross deformities noted, no edema  SKIN: no suspicious lesions, no rashes, age related skin changes with seborrheic keratosis and no actinic keratosis.    NEURO: strength and tone- normal, sensory exam- grossly normal, reflexes- symmetric  BACK: no CVA tenderness, no paralumbar tenderness  MENTAL STATUS EXAM:  Appearance/Behavior: no apparent distress, neatly groomed, dressed appropriately for " "weather, appears stated age and is not frail-appearing  Speech: normal  Mood/Affect: normal affect  Insight: Good     Diagnostic Test Results:  Results for orders placed or performed in visit on 10/25/18 (from the past 24 hour(s))   CBC with platelets   Result Value Ref Range    WBC 5.3 4.0 - 11.0 10e9/L    RBC Count 4.19 3.8 - 5.2 10e12/L    Hemoglobin 13.2 11.7 - 15.7 g/dL    Hematocrit 40.2 35.0 - 47.0 %    MCV 96 78 - 100 fl    MCH 31.5 26.5 - 33.0 pg    MCHC 32.8 31.5 - 36.5 g/dL    RDW 12.6 10.0 - 15.0 %    Platelet Count 187 150 - 450 10e9/L       ASSESSMENT/PLAN:         Tobacco Cessation:   reports that she quit smoking about 48 years ago. She has never used smokeless tobacco.      BMI:   Estimated body mass index is 21.05 kg/(m^2) as calculated from the following:    Height as of this encounter: 5' 1\" (1.549 m).    Weight as of this encounter: 111 lb 6.4 oz (50.5 kg).   Weight management plan noted, stable and monitoring        ICD-10-CM    1. Hypertension goal BP (blood pressure) < 140/90- well controlled on medications  I10 BASIC METABOLIC PANEL     Albumin Random Urine Quantitative with Creat Ratio     CBC with platelets   2. Asymptomatic stenosis of left carotid artery I65.22    3. Hyperlipidemia LDL goal <70 E78.5 Lipid panel reflex to direct LDL Fasting   4. Chronic rhinitis J31.0 ipratropium (ATROVENT) 0.06 % spray- see if this helps with residual nasal congestion and drainage.    5. Need for prophylactic vaccination and inoculation against influenza Z23 FLU VACCINE, INCREASED ANTIGEN, PRESV FREE, AGE 65+ [25438]     Vaccine Administration, Initial [71663]   6. Pulmonary nodules R91.8 Seen in pulmonary clinic with follow up in 1-2 months   7. Pulmonary emphysema, unspecified emphysema type (H) J43.9 Has had pulmonary function tests completed. Using inhaler but not sure if she is using it correctly.        CONSULTATION/REFERRAL to pulmonary as scheduled  FUTURE LABS:       - Schedule fasting labs in " 6 months  FUTURE APPOINTMENTS:       - Follow-up visit in 6 months or sooner if any questions or concerns.     Esmer Finley MD  [facility]        Injectable Influenza Immunization Documentation    1.  Is the person to be vaccinated sick today?   No    2. Does the person to be vaccinated have an allergy to a component   of the vaccine?   No  Egg Allergy Algorithm Link    3. Has the person to be vaccinated ever had a serious reaction   to influenza vaccine in the past?   No    4. Has the person to be vaccinated ever had Guillain-Barré syndrome?   No    Form completed by Nahomy Jo MA    Prior to injection verified patient identity using patient's name and date of birth.  Due to injection administration, patient instructed to remain in clinic for 15 minutes  afterwards, and to report any adverse reaction to me immediately.

## 2018-10-29 NOTE — PROGRESS NOTES
Dear Diann John,    Your test results are attached. I am happy to let you know that they are stable and your medications can stay the same.    The blood sugar is normal and you do not have diabetes. The kidneys are healthy. The cholesterol looks great. The hemoglobin is normal and you do not have anemia. We can recheck labs in 1 year.     Please call me if you have any questions about these test results or about your care.    Sincerely,    Esmer Finley MD

## 2018-12-17 ENCOUNTER — OFFICE VISIT (OUTPATIENT)
Dept: PULMONOLOGY | Facility: CLINIC | Age: 83
End: 2018-12-17
Payer: COMMERCIAL

## 2018-12-17 VITALS
BODY MASS INDEX: 20.71 KG/M2 | DIASTOLIC BLOOD PRESSURE: 77 MMHG | RESPIRATION RATE: 17 BRPM | HEIGHT: 61 IN | HEART RATE: 83 BPM | OXYGEN SATURATION: 97 % | SYSTOLIC BLOOD PRESSURE: 142 MMHG | WEIGHT: 109.7 LBS | TEMPERATURE: 96.8 F

## 2018-12-17 DIAGNOSIS — J44.9 CHRONIC OBSTRUCTIVE PULMONARY DISEASE, UNSPECIFIED COPD TYPE (H): Primary | ICD-10-CM

## 2018-12-17 PROCEDURE — 99214 OFFICE O/P EST MOD 30 MIN: CPT | Performed by: INTERNAL MEDICINE

## 2018-12-17 RX ORDER — ALBUTEROL SULFATE 90 UG/1
2 AEROSOL, METERED RESPIRATORY (INHALATION) EVERY 4 HOURS PRN
Qty: 1 INHALER | Refills: 3 | Status: SHIPPED | OUTPATIENT
Start: 2018-12-17 | End: 2019-10-18

## 2018-12-17 RX ORDER — TIOTROPIUM BROMIDE 18 UG/1
18 CAPSULE ORAL; RESPIRATORY (INHALATION) DAILY
Qty: 90 CAPSULE | Refills: 3 | Status: SHIPPED | OUTPATIENT
Start: 2018-12-17 | End: 2019-10-18

## 2018-12-17 ASSESSMENT — MIFFLIN-ST. JEOR: SCORE: 869.98

## 2018-12-17 ASSESSMENT — PAIN SCALES - GENERAL: PAINLEVEL: NO PAIN (0)

## 2018-12-17 NOTE — PROGRESS NOTES
LUNG NODULE & INTERVENTIONAL PULMONARY CLINIC  [facility]     Diann John MRN# [id number]   Age: 87 year old YOB: 1931     Reason for Consultation: lung nodule(s)     Assessment and Plan:    1. Established multiple pulmonary lung nodule(s). Given the characteristics on current/previous imaging and risk factors; I would classify this to be low risk for cancer. Last CT was in 2018, and we will repeat CT annually. Next ct and visit on 2019.     2. COPD. Mild in severity. Up-to-date on flu and PNA vaccine. No recent AECOPD. Had significant improvement with prn albuterol. I will prescribe spiriva and prn albuterol.     Billing: I spent more than 30 minutes face to face and greater than 50% of time was for counseling and coordination of care about the issues above.      Luis Gray MD   of Medicine  Interventional Pulmonology  Department of Pulmonary, Allergy, Critical Care and Sleep Medicine   [facility]  Pager: [phone]          History:     Diann John is a 87 year old female with sig h/o for HTN, COPD, hyperlipidemia, and allergies who is here for evaluation/followup of recent dyspnea and PNA.     - No new resp sx or complaints. Denies dyspnea or cough today. Feels much better since using albuterol.  - Had CT angio head/neck and found incidental nodule (8mm in RUL). Had short term f/u in 2017 which showed resolution of the RUL nodule. There are 7mm and sub-6mm nodules that have been stable.    - Personal hx of cancer: No. Normal mammograms and c-scope previously.   - Family hx of cancer: No.   of lung cancer.   - Exposure hx: Denies asbestos or radon exposure   - Tobacco hx: Past Smoker: 0.5ppd for 30years. Quit 36yrs ago.   - My interpretation of the images relevant for this visit includes: previous RUL nodule is resolving. sub7mm bilateral nodules are stable. RLL  anterior scarring in areas of emphysema    - My interpretation of the PFT's relevant for this visit includes: Obstructive pattern      Culprit Nodule(s):   1: Right upper lobe nodule and is 8 mm in size/severity and non-calcified in morphology/quality. First seen by chest CT on 2017. There is a decrease in size.     Other nodules:   Multiple bilateral lung nodule(s) and ground glass nodule that are sub 7 mm. First seen by chest CT on 2017. There is no interval change     Other active medical problems include:   - Has HTN and high cholesterol. Stable.  - Has COPD. Significant improvement with prn albuterol.   - No previous hospitalizations for breathing. Up-to-date on vaccinations.             Past Medical History:      Past Medical History:   Diagnosis Date     Hypertension      Nonsenile cataract      Postherpetic neuralgia      Shingles     3 x      Steroid responders 2014           Past Surgical History:      Past Surgical History:   Procedure Laterality Date     APPENDECTOMY       C RAD RESEC TONSIL/PILLARS      Adeniods     CATARACT IOL, RT/LT Right      HAND SURGERY       NOSE SURGERY            Social History:     Social History     Tobacco Use     Smoking status: Former Smoker     Last attempt to quit: 1970     Years since quittin.9     Smokeless tobacco: Never Used   Substance Use Topics     Alcohol use: No          Family History:     Family History   Problem Relation Age of Onset     Cancer Father      Glaucoma Father      Cancer Mother      Hypertension Mother      Hypertension Sister      Cerebrovascular Disease Sister      Thyroid Disease No family hx of      Macular Degeneration No family hx of            Allergies:      Allergies   Allergen Reactions     Codeine Other (See Comments)     Heaaches     Lisinopril Cough          Medications:     Current Outpatient Medications   Medication Sig     albuterol (PROAIR HFA/PROVENTIL HFA/VENTOLIN HFA) 108 (90 Base) MCG/ACT Inhaler Inhale 2  puffs into the lungs every 6 hours as needed for shortness of breath / dyspnea or wheezing     amLODIPine (NORVASC) 2.5 MG tablet TAKE ONE TABLET BY MOUTH ONCE DAILY     ascorbic acid (VITAMIN C) 1000 MG TABS Take 1,000 mg by mouth daily     aspirin 81 MG tablet Take 1 tablet (81 mg) by mouth daily     atorvastatin (LIPITOR) 20 MG tablet TAKE ONE TABLET BY MOUTH ONCE DAILY     Calcium-Vitamin D (CALCIUM + D PO) Take 1 tablet by mouth daily.     erythromycin (ROMYCIN) ophthalmic ointment Place into both eyes At Bedtime Apply small (1/4 inch) strip to affected eye(s)     fluticasone (FLONASE) 50 MCG/ACT spray Spray 1-2 sprays into both nostrils daily     omega-3 fatty acids (FISH OIL) 1200 MG capsule Take 1 capsule by mouth daily     order for DME Equipment being ordered: BP cuff/machine     ipratropium (ATROVENT) 0.06 % spray Spray 1 spray into both nostrils 4 times daily as needed for rhinitis (Patient not taking: Reported on 12/17/2018)     VAQTA 50 UNIT/ML injection Inject 50 Units into the muscle once Reported on 4/4/2017     No current facility-administered medications for this visit.      Facility-Administered Medications Ordered in Other Visits   Medication     DOBUTamine 500 mg in dextrose 5% 250 mL (adult std)          Review of Systems:     CONSTITUTIONAL: negative for fever, chills, change in weight  INTEGUMENTARY/SKIN: no rash or obvious new lesions  ENT/MOUTH: no sore throat, new sinus pain or nasal drainage  RESP: see interval history  CV: negative for chest pain, palpitations or peripheral edema  GI: no nausea, vomiting, change in stools  : no dysuria  MUSCULOSKELETAL: no myalgias, arthralgias  ENDOCRINE: blood sugars with adequate control  PSYCHIATRIC: mood stable  LYMPHATIC: no new lymphadenopathy  HEME: no bleeding or easy bruisability  NEURO: no numbness, weakness, headaches         Physical Exam:     Temp:  [96.8  F (36  C)] 96.8  F (36  C)  Pulse:  [83] 83  Resp:  [17] 17  BP: (142)/(77)  142/77  SpO2:  [97 %] 97 %  Wt Readings from Last 4 Encounters:   12/17/18 49.8 kg (109 lb 11.2 oz)   10/25/18 50.5 kg (111 lb 6.4 oz)   08/06/18 53.1 kg (117 lb 1.6 oz)   07/26/18 52.6 kg (116 lb)     Constitutional:   Awake, alert and in no apparent distress     Eyes:   Nonicteric, JAMAR     ENT:    Trachea is midline. No gross neck abnormalities      Neck:   Supple without supraclavicular or cervical lymphadenopathy     Lungs:   Good air flow.  No crackles. No rhonchi.  No wheezes.     Cardiovascular:   Normal S1 and S2.  RRR.  No murmur, gallop or rub.  Radial, DP and PT pulses normal and symmetric     Abdomen:   NABS, soft, nontender, nondistended.  No HSM.     Musculoskeletal:   No edema.      Neurologic:   Alert and conversant. Cranial nerves  intact.       Skin:   Warm, dry.  No rash on limited exam.           Current Laboratory Data:   All laboratory and imaging data reviewed.    No results found for this or any previous visit (from the past 24 hour(s)).         Previous Pulmonary Function Testing     FVC-Pred   Date Value Ref Range Status   08/03/2018 2.12 L      FVC-Pre   Date Value Ref Range Status   08/03/2018 3.06 L      FVC-%Pred-Pre   Date Value Ref Range Status   08/03/2018 144 %      FEV1-Pre   Date Value Ref Range Status   08/03/2018 1.91 L      FEV1-%Pred-Pre   Date Value Ref Range Status   08/03/2018 119 %      FEV1FVC-Pred   Date Value Ref Range Status   08/03/2018 72 %      FEV1FVC-Pre   Date Value Ref Range Status   08/03/2018 62 %      No results found for: 20029  FEFMax-Pred   Date Value Ref Range Status   08/03/2018 3.62 L/sec      FEFMax-Pre   Date Value Ref Range Status   08/03/2018 5.54 L/sec      FEFMax-%Pred-Pre   Date Value Ref Range Status   08/03/2018 153 %      ExpTime-Pre   Date Value Ref Range Status   08/03/2018 6.81 sec      FIFMax-Pre   Date Value Ref Range Status   08/03/2018 3.83 L/sec      FEV1FEV6-Pred   Date Value Ref Range Status   08/03/2018 77 %      FEV1FEV6-Pre    Date Value Ref Range Status   08/03/2018 63 %             Previous Cardiology Imaging   No results found for this or any previous visit (from the past 8760 hour(s)).

## 2018-12-17 NOTE — NURSING NOTE
"Diann John's goals for this visit include: Return  She requests these members of her care team be copied on today's visit information: PCP    PCP: Esmer Finley    Referring Provider:  No referring provider defined for this encounter.    /77   Pulse 83   Temp 96.8  F (36  C)   Resp 17   Ht 1.549 m (5' 1\")   Wt 49.8 kg (109 lb 11.2 oz)   LMP 05/10/1978 (Approximate)   SpO2 97%   BMI 20.73 kg/m      Do you need any medication refills at today's visit? N    "

## 2018-12-18 ENCOUNTER — TELEPHONE (OUTPATIENT)
Dept: PULMONOLOGY | Facility: CLINIC | Age: 83
End: 2018-12-18

## 2018-12-18 NOTE — TELEPHONE ENCOUNTER
Routing message to pulmonology for review.    Encounter routed to Primary Care in error.  Nel Holder, CMA

## 2018-12-18 NOTE — TELEPHONE ENCOUNTER
Central Prior Authorization Team   [phone]    PA Initiation    Medication: tiotropium (SPIRIVA) 18 MCG inhaled capsule  Insurance Company: [employer] - Phone [phone] Fax [phone]  Pharmacy Filling the Rx: [facility] [address] [place] [address] [facility]  Filling [facility] Phone: [phone]  Filling Pharmacy Fax:    Start Date: 12/18/2018

## 2018-12-18 NOTE — TELEPHONE ENCOUNTER
Prior Authorization Approval    Authorization Effective Date: 9/19/2018  Authorization Expiration Date: 12/18/2019  Medication: tiotropium (SPIRIVA) 18 MCG inhaled capsule-PA APPROVED   Approved Dose/Quantity:   Reference #:     Insurance Company: [employer] [phone] Fax [phone]  Expected CoPay:       CoPay Card Available:      Foundation Assistance Needed:    Which Pharmacy is filling the prescription (Not needed for infusion/clinic administered): [facility] [address] [place], [place] - [address] [facility]  Pharmacy Notified: Yes  Patient Notified: Yes

## 2018-12-21 ENCOUNTER — TELEPHONE (OUTPATIENT)
Dept: PULMONOLOGY | Facility: CLINIC | Age: 83
End: 2018-12-21

## 2018-12-21 DIAGNOSIS — J44.9 COPD (CHRONIC OBSTRUCTIVE PULMONARY DISEASE) (H): Primary | ICD-10-CM

## 2018-12-21 DIAGNOSIS — J43.2 CENTRILOBULAR EMPHYSEMA (H): ICD-10-CM

## 2018-12-21 DIAGNOSIS — R06.00 DYSPNEA, UNSPECIFIED TYPE: ICD-10-CM

## 2018-12-21 NOTE — TELEPHONE ENCOUNTER
Health Call Center    Phone Message    May a detailed message be left on voicemail: yes    Reason for Call: Patient was advised by her pharmacy that  tiotropium (SPIRIVA) 18 MCG inhaled capsule [42869] (Order [id number])  is not covered by her insurance but Incruse is covered. Would like to know if this medication would work. Please call patient to discuss.      Action Taken: Message routed to:  Adult Clinics: Pulmonology p [phone]

## 2018-12-21 NOTE — TELEPHONE ENCOUNTER
Spoke with Heladio, pharmacy tech at [facility] # [phone].    Heladio informed me that patient's co-pay for Spiriva Rx. after the prior auth was approved is going to cost the patient $540.64 out of pocket.    Patient unable to afford Spiriva. Pharmacy recommends changing the Spiriva Rx. to Incruse.    Will send message to Dr. Gray requesting this medication change.    Order for Incruse has been pended.    Murphy Encarnacion LPN    Rheumatology / Pulmonology  [facility]

## 2019-01-17 ENCOUNTER — DOCUMENTATION ONLY (OUTPATIENT)
Dept: OPHTHALMOLOGY | Facility: CLINIC | Age: 84
End: 2019-01-17

## 2019-03-15 ENCOUNTER — OFFICE VISIT (OUTPATIENT)
Dept: FAMILY MEDICINE | Facility: CLINIC | Age: 84
End: 2019-03-15
Payer: COMMERCIAL

## 2019-03-15 VITALS
TEMPERATURE: 97.8 F | RESPIRATION RATE: 20 BRPM | SYSTOLIC BLOOD PRESSURE: 128 MMHG | HEIGHT: 61 IN | DIASTOLIC BLOOD PRESSURE: 69 MMHG | WEIGHT: 109 LBS | HEART RATE: 87 BPM | OXYGEN SATURATION: 98 % | BODY MASS INDEX: 20.58 KG/M2

## 2019-03-15 DIAGNOSIS — Z80.0 FAMILY HISTORY OF COLON CANCER: ICD-10-CM

## 2019-03-15 DIAGNOSIS — I65.22 ASYMPTOMATIC STENOSIS OF LEFT CAROTID ARTERY: ICD-10-CM

## 2019-03-15 DIAGNOSIS — I70.90 ASVD (ARTERIOSCLEROTIC VASCULAR DISEASE): ICD-10-CM

## 2019-03-15 DIAGNOSIS — I10 HYPERTENSION GOAL BP (BLOOD PRESSURE) < 140/90: Primary | ICD-10-CM

## 2019-03-15 DIAGNOSIS — E78.5 HYPERLIPIDEMIA LDL GOAL <70: ICD-10-CM

## 2019-03-15 DIAGNOSIS — J44.9 CHRONIC OBSTRUCTIVE PULMONARY DISEASE, UNSPECIFIED COPD TYPE (H): ICD-10-CM

## 2019-03-15 PROCEDURE — 99214 OFFICE O/P EST MOD 30 MIN: CPT | Performed by: FAMILY MEDICINE

## 2019-03-15 ASSESSMENT — PAIN SCALES - GENERAL: PAINLEVEL: NO PAIN (0)

## 2019-03-15 ASSESSMENT — MIFFLIN-ST. JEOR: SCORE: 866.8

## 2019-03-15 NOTE — PROGRESS NOTES
SUBJECTIVE:   Diann John is a 87 year old female who presents to clinic today for the following health issues:    Concern:  Colonoscolopy - Sister dying of colon cancer so wants to discuss about colonoscopy if recommended. Last colonoscopy done 89/16/2010. Interested in Cologuard test at this time.     Hypertension Follow-up      Outpatient blood pressures are being checked at home.  Results are 130s/60s, heart rate 80-90s.    Low Salt Diet: low salt, no added salt      Amount of exercise or physical activity: daily 3 miles and walking up and down stairs at home    Problems taking medications regularly: No    Medication side effects: none    Diet: low salt and low fat/cholesterol          Hyperlipidemia Follow-Up      Rate your low fat/cholesterol diet?: good    Taking statin?  Yes, no muscle aches from statin    Other lipid medications/supplements?:  none    Vascular Disease Follow-up:  Coronary Artery Disease (CAD)      Chest pain or pressure, left side neck or arm pain: No    Shortness of breath/increased sweats/nausea with exertion: No    Pain in calves walking 1-2 blocks: No    Worsened or new symptoms since last visit: No    Nitroglycerin use: no    Daily aspirin use: Yes      Problem list and histories reviewed & adjusted, as indicated.  Additional history: as documented    Patient Active Problem List   Diagnosis     Macular pucker, right eye     Pseudophakia OU     Blepharitis     Hypertension goal BP (blood pressure) < 140/90     Seasonal allergies     Carotid artery stenosis, asymptomatic     PVD (posterior vitreous detachment) OU     Advanced directives, counseling/discussion     ASVD (arteriosclerotic vascular disease)     Hyperlipidemia LDL goal <70     Chronic rhinitis     Pulmonary nodules     Pulmonary emphysema, unspecified emphysema type (H)     Past Surgical History:   Procedure Laterality Date     APPENDECTOMY       C RAD RESEC TONSIL/PILLARS      Adeniods     CATARACT IOL, RT/LT Right       HAND SURGERY       NOSE SURGERY         Social History     Tobacco Use     Smoking status: Former Smoker     Last attempt to quit: 1970     Years since quittin.2     Smokeless tobacco: Never Used   Substance Use Topics     Alcohol use: No     Family History   Problem Relation Age of Onset     Cancer Father      Glaucoma Father      Cancer Mother      Hypertension Mother      Hypertension Sister      Cerebrovascular Disease Sister      Thyroid Disease No family hx of      Macular Degeneration No family hx of          Current Outpatient Medications   Medication Sig Dispense Refill     albuterol (PROAIR HFA/PROVENTIL HFA/VENTOLIN HFA) 108 (90 Base) MCG/ACT inhaler Inhale 2 puffs into the lungs every 4 hours as needed for shortness of breath / dyspnea or wheezing 1 Inhaler 3     albuterol (PROAIR HFA/PROVENTIL HFA/VENTOLIN HFA) 108 (90 Base) MCG/ACT Inhaler Inhale 2 puffs into the lungs every 6 hours as needed for shortness of breath / dyspnea or wheezing 1 Inhaler 0     amLODIPine (NORVASC) 2.5 MG tablet TAKE ONE TABLET BY MOUTH ONCE DAILY 90 tablet 3     ascorbic acid (VITAMIN C) 1000 MG TABS Take 1,000 mg by mouth daily       aspirin 81 MG tablet Take 1 tablet (81 mg) by mouth daily 30 tablet      atorvastatin (LIPITOR) 20 MG tablet TAKE ONE TABLET BY MOUTH ONCE DAILY 90 tablet 2     Calcium-Vitamin D (CALCIUM + D PO) Take 1 tablet by mouth daily.       erythromycin (ROMYCIN) ophthalmic ointment Place into both eyes At Bedtime Apply small (1/4 inch) strip to affected eye(s) 1 Tube 3     fluticasone (FLONASE) 50 MCG/ACT spray Spray 1-2 sprays into both nostrils daily 1 Bottle 11     ipratropium (ATROVENT) 0.06 % spray Spray 1 spray into both nostrils 4 times daily as needed for rhinitis 1 Box 3     omega-3 fatty acids (FISH OIL) 1200 MG capsule Take 1 capsule by mouth daily       order for DME Equipment being ordered: BP cuff/machine 1 Device 0     tiotropium (SPIRIVA) 18 MCG inhaled capsule Inhale 1 capsule  "(18 mcg) into the lungs daily 90 capsule 3     umeclidinium (INCRUSE ELLIPTA) 62.5 MCG/INH inhaler Inhale 1 puff into the lungs daily 1 Inhaler 3     VAQTA 50 UNIT/ML injection Inject 50 Units into the muscle once Reported on 4/4/2017       Allergies   Allergen Reactions     Codeine Other (See Comments)     Heaaches     Lisinopril Cough     Recent Labs   Lab Test 10/25/18  1118 10/03/17  0728  09/26/16  1206 03/07/16  1555  08/15/13  0859 10/19/12   LDL 57 43  --  74  --    < > 123  --    HDL 61 68  --  57  --    < > 59  --    TRIG 54 58  --  60  --    < > 99  --    ALT  --   --   --   --  33  --  32  --    CR 0.71 0.78   < >  --  0.83   < > 0.76  --    GFRESTIMATED 78 70   < >  --  65   < > 73  --    GFRESTBLACK >90 85   < >  --  79   < > 88  --    POTASSIUM 3.9 4.0   < >  --  3.7   < > 3.8  --    TSH  --   --   --   --   --   --  3.53 2.18    < > = values in this interval not displayed.      BP Readings from Last 3 Encounters:   03/15/19 128/69   12/17/18 142/77   10/25/18 132/68    Wt Readings from Last 3 Encounters:   03/15/19 49.4 kg (109 lb)   12/17/18 49.8 kg (109 lb 11.2 oz)   10/25/18 50.5 kg (111 lb 6.4 oz)                  Labs reviewed in EPIC    Reviewed and updated as needed this visit by clinical staff  Tobacco  Allergies  Med Hx  Surg Hx  Fam Hx  Soc Hx      Reviewed and updated as needed this visit by Provider         ROS:  Constitutional, HEENT, cardiovascular, pulmonary, gi and gu systems are negative, except as otherwise noted.    OBJECTIVE:     /69 (BP Location: Right arm, Patient Position: Chair, Cuff Size: Adult Regular)   Pulse 87   Temp 97.8  F (36.6  C) (Oral)   Resp 20   Ht 1.549 m (5' 1\")   Wt 49.4 kg (109 lb)   LMP 05/10/1978 (Approximate)   SpO2 98%   BMI 20.60 kg/m    Body mass index is 20.6 kg/m .  GENERAL: elderly, alert, well nourished, well hydrated, no distress  HENT: ear canals- normal; TMs- normal; Nose- normal; Mouth- no ulcers, no lesions, missing " dentition  NECK: no tenderness, no adenopathy, no asymmetry, no masses, no stiffness; thyroid- normal to palpation  RESP: lungs clear to auscultation - no rales, no rhonchi, no wheezes  CV: regular rates and rhythm, normal S1 S2, no S3 or S4 and no murmur, no click or rub, normal pulses  ABDOMEN: soft, no tenderness, no  hepatosplenomegaly, no masses, normal bowel sounds  MS: extremities- no gross deformities noted, no edema  SKIN: no suspicious lesions, no rashes, age related skin changes with seborrheic keratosis and no actinic keratosis.    NEURO: strength and tone- normal, sensory exam- grossly normal, reflexes- symmetric, very agile for her age  BACK: no CVA tenderness, no paralumbar tenderness  MENTAL STATUS EXAM:  Appearance/Behavior: no apparent distress, neatly groomed, dressed appropriately for weather, appears stated age and is not frail-appearing  Speech: normal  Mood/Affect: normal affect  Insight: Excellent     Diagnostic Test Results:  Results for orders placed or performed in visit on 10/25/18   BASIC METABOLIC PANEL   Result Value Ref Range    Sodium 141 133 - 144 mmol/L    Potassium 3.9 3.4 - 5.3 mmol/L    Chloride 107 94 - 109 mmol/L    Carbon Dioxide 27 20 - 32 mmol/L    Anion Gap 7 3 - 14 mmol/L    Glucose 87 70 - 99 mg/dL    Urea Nitrogen 16 7 - 30 mg/dL    Creatinine 0.71 0.52 - 1.04 mg/dL    GFR Estimate 78 >60 mL/min/1.7m2    GFR Estimate If Black >90 >60 mL/min/1.7m2    Calcium 8.8 8.5 - 10.1 mg/dL   Albumin Random Urine Quantitative with Creat Ratio   Result Value Ref Range    Creatinine Urine 131 mg/dL    Albumin Urine mg/L 16 mg/L    Albumin Urine mg/g Cr 12.29 0 - 25 mg/g Cr   CBC with platelets   Result Value Ref Range    WBC 5.3 4.0 - 11.0 10e9/L    RBC Count 4.19 3.8 - 5.2 10e12/L    Hemoglobin 13.2 11.7 - 15.7 g/dL    Hematocrit 40.2 35.0 - 47.0 %    MCV 96 78 - 100 fl    MCH 31.5 26.5 - 33.0 pg    MCHC 32.8 31.5 - 36.5 g/dL    RDW 12.6 10.0 - 15.0 %    Platelet Count 187 150 - 450  "10e9/L   Lipid panel reflex to direct LDL Fasting   Result Value Ref Range    Cholesterol 129 <200 mg/dL    Triglycerides 54 <150 mg/dL    HDL Cholesterol 61 >49 mg/dL    LDL Cholesterol Calculated 57 <100 mg/dL    Non HDL Cholesterol 68 <130 mg/dL       ASSESSMENT/PLAN:         Tobacco Cessation:   reports that she quit smoking about 49 years ago. she has never used smokeless tobacco.      BMI:   Estimated body mass index is 20.6 kg/m  as calculated from the following:    Height as of this encounter: 1.549 m (5' 1\").    Weight as of this encounter: 49.4 kg (109 lb).   Weight management plan noted, stable and monitoring        ICD-10-CM    1. Hypertension goal BP (blood pressure) < 140/90 I10 Well controlled on medications    2. Chronic obstructive pulmonary disease, unspecified COPD type (H) J44.9 Using inhalers from pulmonary with follow up this summer- CT scan for pulmonary nodules. Not happy with expense of the new inhaler   3. Hyperlipidemia LDL goal <70 E78.5 Stable    4. Asymptomatic stenosis of left carotid artery I65.22 No new symptoms or stroke symptoms    5. ASVD (arteriosclerotic vascular disease) I70.90 No chest pain or dyspnea   6. Family history of colon cancer Z80.0 Discussed why colonoscopy is not done at her age due to friability of colon, risk of perforation is greater than risk of colon cancer. Cologuard form given to patient to complete.        FUTURE LABS:       - Schedule fasting labs in 6 months  FUTURE APPOINTMENTS:       - Follow-up visit in 6 months or sooner if any questions or concerns.   See Patient Instructions    Esmer Finley MD  [facility]    "

## 2019-03-15 NOTE — PATIENT INSTRUCTIONS
At [facility], we strive to deliver an exceptional experience to you, every time we see you.  If you receive a survey in the mail, please send us back your thoughts. We really do value your feedback.    Based on your medical history, these are the current health maintenance/preventive care services that you are due for (some may have been done at this visit.)  Health Maintenance Due   Topic Date Due     COPD ACTION PLAN Q1 YR  08/31/1931     ZOSTER IMMUNIZATION (1 of 2) 07/31/1981     MEDICARE ANNUAL WELLNESS VISIT  09/26/2017     FALL RISK ASSESSMENT  02/15/2019         Suggested websites for health information:  Www.[email or web address].[email or web address] : Up to date and easily searchable information on multiple topics.  Www.[employer].gov : medication info, interactive tutorials, watch real surgeries online  Www.familydoctor.org : good info from the Academy of Family Physicians  Www.cdc.gov : public health info, travel advisories, epidemics (H1N1)  Www.aap.org : children's health info, normal development, vaccinations  Www.health.[facility].[place].us : [place] dept of health, public health issues in [place], N1N1    Your care team:                            Family Medicine Internal Medicine   MD Ghulam Santos MD Shantel Branch-Fleming, MD Katya Georgiev PA-C Nam Ho, MD Pediatrics   PANDA Hernandez, YVETTE Prajapati APRLAM CNP   MD Jaimie Stockton MD Deborah Mielke, MD Kim Thein, APRN [facility]      Clinic hours: Monday - Thursday 7 am-7 pm; Fridays 7 am-5 pm.   Urgent care: Monday - Friday 11 am-9 pm; Saturday and Sunday 9 am-5 pm.  Pharmacy : Monday -Thursday 8 am-8 pm; Friday 8 am-6 pm; Saturday and Sunday 9 am-5 pm.     Clinic: [phone]   Pharmacy: [phone]    Patient Education     Colorectal Cancer Screening    Colorectal cancer is cancer in the colon or rectum. It is a leading cause of cancer deaths in the [place] But when this cancer is found and removed early,  the chances of a full recovery are very good. Because colorectal cancer rarely causes symptoms in its early stages, screening for the disease is important. It s even more crucial if you have risk factors for the disease. Learn more about colorectal cancer and its risk factors. Then talk to your healthcare provider about being screened.  Risk factors for colorectal cancer  Your risk of having colorectal cancer increases if you:    Are 50 years of age or older    Have a family history or personal history of colorectal cancer or polyps    Have a personal history of type 2 diabetes, Crohn s disease, or ulcerative colitis    Have an inherited genetic syndrome like Gagnon syndrome (HNPCC) or familial adenomatous polyposis (FAP)    Are very overweight    Are not physically active    Smoke    Drink a lot of alcohol    Eat a lot of red or processed meat  The colon and rectum  Waste from food you eat enters the colon from the small intestine. As it travels through the colon, the waste (stool) loses water and becomes more solid. Intestinal muscles push it toward the sigmoid colon. This is the last section of the colon. Stool then moves into the rectum, where it s stored until it s ready to leave the body during a bowel movement.  How colorectal cancer starts  Polyps are growths that form on the inner lining of the colon or rectum. Most are benign, which means they aren t cancer. But over time, some polyps can become cancer (malignant). This happens when cells in these polyps begin growing abnormally. In time, malignant cells invade more of the colon and rectum. The cancer may also spread to nearby organs or lymph nodes or to other parts of the body. Finding and removing polyps can help prevent cancer from forming.  Your screening  Screening means looking for a health problem before you have symptoms. During screening for colorectal cancer, your healthcare provider will ask about your health history, examine you, and do 1 or  more tests. To start, you may have:    Health history questions to answer. Your healthcare provider will ask about your health history. Mention if a family member has had colon cancer or polyps. Also mention any health problems you have had in the past.    Digital rectal exam (SHANI). During a SHANI, the healthcare provider inserts a lubricated gloved finger into the rectum. The test is painless and takes less than a minute. This test alone is not enough to screen for colorectal cancer. You will also need one of the below tests.  Screening test choices  Fecal occult blood test (FOBT) or fecal immunochemical test (FIT)  These tests check for blood in stool that you can t see (hidden or occult blood). Hidden blood may be a sign of colon polyps or cancer. A small sample of stool is tested for blood in a laboratory. Most often, you collect this sample at home using a kit your healthcare provider gives you. Follow the instructions carefully for using this kit. You might need to not eat certain foods and not take certain medicines before the test, as directed.  Stool DNA test  This test looks for DNA changes in cells in the stool. These DNA changes might be signs of cancer. It also looks for hidden blood in stool. For this test, you collect an entire bowel movement. This is done using a special container put in the toilet. The sample is then sent to a lab for testing.  Barium enema with contrast (double-contrast barium enema)  This test uses X-rays to create images of the entire colon and rectum. The day before this test, you will need to do a bowel prep to clean out the colon and rectum. A bowel prep is a liquid diet plus strong laxatives or enemas. You will be awake for the test, but you may be given medicine to help you relax. At the start of the test, a healthcare provider who specializes in imaging tests (radiologist) places a soft tube into the rectum. The tube is used to fill the colon with a contrast liquid (barium)  and air. This can be uncomfortable for some people. The liquid helps the colon show up clearly on the X-rays. Because the test uses X-rays, it exposes you to a small amount of radiation.  Virtual colonoscopy  This exam is also called a CT colonography. It uses a series of X-ray photographs to create a 3-D view of the colon and rectum. The day before the test, you will need to do a bowel prep to clean out your colon. Your healthcare provider will give you instructions on how to do this. During the procedure, you will lie on a table that is part of a special X-ray machine called a CT scanner. A small tube will be placed into your rectum to fill the colon and rectum with air. This can be uncomfortable for some people. Then, the table will move into the machine and pictures will be taken of your colon and rectum. A computer will combine these photos to create a 3-D picture. Because the test uses X-rays, it exposes you to a small amount of radiation.  Scope exams  Here are 2 types of scope exams:    Colonoscopy. This test can be used to find and remove polyps anywhere in the colon or rectum. The day before the test, you will do a bowel prep. This is a liquid diet plus a strong laxative solution or an enema. The bowel prep will cleanse your colon. You will be given instructions for this. Just before the test, you are given a medicine to make you sleepy. Then, a long, flexible, lighted tube called a colonoscope is gently inserted into the rectum and guided through the entire colon. Images of the colon are viewed on a video screen. Any polyps that are found are removed and sent to a lab for testing. If a polyp can t be removed, a sample of tissue is taken and the polyp might be removed later during surgery. You will need to bring someone with you to drive you home after this test. Colonoscopy is the only screening test that lets your healthcare provider see the entire colon and rectum. This test also lets your healthcare  provider remove any pieces of tissue that need to be looked at by a lab. If something suspicious is found using any other tests, you will likely need a colonoscopy.    Sigmoidoscopy. This test is similar to colonoscopy, but focuses only on the sigmoid colon and rectum. As with colonoscopy, bowel prep must be done the day before this test. It might not need to be as complete as the bowel prep for a colonoscopy. You are awake during the procedure, but you may be given medicine to help you relax. During the test, the healthcare provider guides a thin, flexible, lighted tube called a sigmoidoscope through your rectum and lower colon. The images are displayed on a video screen. Polyps are removed, if possible, and sent to a lab for testing.     When to call your healthcare provider after a test  Call your healthcare provider if you have any of the following after any screening test:    Bleeding    Fever of 100.4 F (38 C) or higher, or as directed by your healthcare provider    Abdominal pain    Vomiting   Date Last Reviewed: 12/1/2017 2000-2018 [employer]. [address] [place] [address], [place] [address]. All rights reserved. This information is not intended as a substitute for professional medical care. Always follow your healthcare professional's instructions.

## 2019-03-18 ENCOUNTER — OFFICE VISIT (OUTPATIENT)
Dept: FAMILY MEDICINE | Facility: CLINIC | Age: 84
End: 2019-03-18
Payer: COMMERCIAL

## 2019-03-18 ENCOUNTER — TELEPHONE (OUTPATIENT)
Dept: FAMILY MEDICINE | Facility: CLINIC | Age: 84
End: 2019-03-18

## 2019-03-18 VITALS
TEMPERATURE: 97.3 F | BODY MASS INDEX: 20.73 KG/M2 | HEART RATE: 76 BPM | OXYGEN SATURATION: 98 % | SYSTOLIC BLOOD PRESSURE: 116 MMHG | HEIGHT: 61 IN | DIASTOLIC BLOOD PRESSURE: 60 MMHG | WEIGHT: 109.8 LBS

## 2019-03-18 DIAGNOSIS — N30.00 ACUTE CYSTITIS WITHOUT HEMATURIA: Primary | ICD-10-CM

## 2019-03-18 DIAGNOSIS — R82.90 NONSPECIFIC FINDING ON EXAMINATION OF URINE: ICD-10-CM

## 2019-03-18 LAB
ALBUMIN UR-MCNC: 30 MG/DL
APPEARANCE UR: ABNORMAL
BACTERIA #/AREA URNS HPF: ABNORMAL /HPF
BILIRUB UR QL STRIP: NEGATIVE
COLOR UR AUTO: YELLOW
GLUCOSE UR STRIP-MCNC: NEGATIVE MG/DL
HGB UR QL STRIP: ABNORMAL
KETONES UR STRIP-MCNC: NEGATIVE MG/DL
LEUKOCYTE ESTERASE UR QL STRIP: ABNORMAL
NITRATE UR QL: POSITIVE
NON-SQ EPI CELLS #/AREA URNS LPF: ABNORMAL /LPF
PH UR STRIP: 6.5 PH (ref 5–7)
RBC #/AREA URNS AUTO: ABNORMAL /HPF
SOURCE: ABNORMAL
SP GR UR STRIP: <=1.005 (ref 1–1.03)
UROBILINOGEN UR STRIP-ACNC: 0.2 EU/DL (ref 0.2–1)
WBC #/AREA URNS AUTO: >100 /HPF

## 2019-03-18 PROCEDURE — 87086 URINE CULTURE/COLONY COUNT: CPT | Performed by: NURSE PRACTITIONER

## 2019-03-18 PROCEDURE — 87186 SC STD MICRODIL/AGAR DIL: CPT | Performed by: NURSE PRACTITIONER

## 2019-03-18 PROCEDURE — 81001 URINALYSIS AUTO W/SCOPE: CPT | Performed by: NURSE PRACTITIONER

## 2019-03-18 PROCEDURE — 87088 URINE BACTERIA CULTURE: CPT | Performed by: NURSE PRACTITIONER

## 2019-03-18 PROCEDURE — 99213 OFFICE O/P EST LOW 20 MIN: CPT | Performed by: NURSE PRACTITIONER

## 2019-03-18 RX ORDER — SULFAMETHOXAZOLE/TRIMETHOPRIM 800-160 MG
1 TABLET ORAL 2 TIMES DAILY
Qty: 6 TABLET | Refills: 0 | Status: SHIPPED | OUTPATIENT
Start: 2019-03-18 | End: 2019-08-05

## 2019-03-18 ASSESSMENT — PAIN SCALES - GENERAL: PAINLEVEL: MODERATE PAIN (5)

## 2019-03-18 ASSESSMENT — MIFFLIN-ST. JEOR: SCORE: 862.49

## 2019-03-18 NOTE — PATIENT INSTRUCTIONS
"Take Bactrim twice a day for 3 days.      Avoid pantry liners, try for 6-8 8oz glasses of water a day    Try for urinating every 3-4 hours.  Patient Education     Bladder Infection, Female (Adult)    Urine is normally doesn't have any bacteria in it. But bacteria can get into the urinary tract from the skin around the rectum. Or they can travel in the blood from elsewhere in the body. Once they are in your urinary tract, they can cause infection in the urethra (urethritis), the bladder (cystitis), or the kidneys (pyelonephritis).  The most common place for an infection is in the bladder. This is called a bladder infection. This is one of the most common infections in women. Most bladder infections are easily treated. They are not serious unless the infection spreads to the kidney.  The phrases \"bladder infection,\" \"UTI,\" and \"cystitis\" are often used to describe the same thing. But they are not always the same. Cystitis is an inflammation of the bladder. The most common cause of cystitis is an infection.  Symptoms  The infection causes inflammation in the urethra and bladder. This causes many of the symptoms. The most common symptoms of a bladder infection are:    Pain or burning when urinating    Having to urinate more often than usual    Urgent need to urinate    Only a small amount of urine comes out    Blood in urine    Abdominal discomfort. This is usually in the lower abdomen above the pubic bone.    Cloudy urine    Strong- or bad-smelling urine    Unable to urinate (urinary retention)    Unable to hold urine in (urinary incontinence)    Fever    Loss of appetite    Confusion (in older adults)  Causes  Bladder infections are not contagious. You can't get one from someone else, from a toilet seat, or from sharing a bath.  The most common cause of bladder infections is bacteria from the bowels. The bacteria get onto the skin around the opening of the urethra. From there, they can get into the urine and travel up " to the bladder, causing inflammation and infection. This usually happens because of:    Wiping improperly after urinating. Always wipe from front to back.    Bowel incontinence    Pregnancy    Procedures such as having a catheter inserted    Older age    Not emptying your bladder. This can allow bacteria a chance to grow in your urine.    Dehydration    Constipation    Sex    Use of a diaphragm for birth control   Treatment  Bladder infections are diagnosed by a urine test. They are treated with antibiotics and usually clear up quickly without complications. Treatment helps prevent a more serious kidney infection.  Medicines  Medicines can help in the treatment of a bladder infection:    Take antibiotics until they are used up, even if you feel better. It is important to finish them to make sure the infection has cleared.    You can use acetaminophen or ibuprofen for pain, fever, or discomfort, unless another medicine was prescribed. If you have chronic liver or kidney disease, talk with your healthcare provider before using these medicines. Also talk with your provider if you've ever had a stomach ulcer or gastrointestinal bleeding, or are taking blood-thinner medicines.    If you are given phenazopydridine to reduce burning with urination, it will cause your urine to become a bright orange color. This can stain clothing.  Care and prevention  These self-care steps can help prevent future infections:    Drink plenty of fluids to prevent dehydration and flush out your bladder. Do this unless you must restrict fluids for other health reasons, or your doctor told you not to.    Proper cleaning after going to the bathroom is important. Wipe from front to back after using the toilet to prevent the spread of bacteria.    Urinate more often. Don't try to hold urine in for a long time.    Wear loose-fitting clothes and cotton underwear. Avoid tight-fitting pants.    Improve your diet and prevent constipation. Eat more  fresh fruit and vegetables, and fiber, and less junk and fatty foods.    Avoid sex until your symptoms are gone.    Avoid caffeine, alcohol, and spicy foods. These can irritate your bladder.    Urinate right after intercourse to flush out your bladder.    If you use birth control pills and have frequent bladder infections, discuss it with your doctor.  Follow-up care  Call your healthcare provider if all symptoms are not gone after 3 days of treatment. This is especially important if you have repeat infections.  If a culture was done, you will be told if your treatment needs to be changed. If directed, you can call to find out the results.  If X-rays were done, you will be told if the results will affect your treatment.  Call 911  Call 911 if any of the following occur:    Trouble breathing    Hard to wake up or confusion    Fainting or loss of consciousness    Rapid heart rate  When to seek medical advice  Call your healthcare provider right away if any of these occur:    Fever of 100.4 F (38.0 C) or higher, or as directed by your healthcare provider    Symptoms are not better by the third day of treatment    Back or belly (abdominal) pain that gets worse    Repeated vomiting, or unable to keep medicine down    Weakness or dizziness    Vaginal discharge    Pain, redness, or swelling in the outer vaginal area (labia)  Date Last Reviewed: 10/1/2016    6218-5330 [employer]. [address] [place] [address], [place] [address]. All rights reserved. This information is not intended as a substitute for professional medical care. Always follow your healthcare professional's instructions.

## 2019-03-18 NOTE — TELEPHONE ENCOUNTER
Reason for call:  Patient reporting a symptom    Symptom or request: bladder infection    Duration (how long have symptoms been present): last week    Have you been treated for this before? Yes    Additional comments: Don't feel like need appt, can come and give specimen , needs advice how to deal with it.     Phone Number patient can be reached at:  Cell number on file:    Telephone Information:   Mobile [phone]       Best Time:  anytime    Can we leave a detailed message on this number:  YES    Call taken on 3/18/2019 at 9:13 AM by Jadyn Levine

## 2019-03-18 NOTE — TELEPHONE ENCOUNTER
"Writer took parked call.    Patient complaining of bladder infection/UTI like symptoms. Having increase in urgency to urinate, but when does, only scant amount is produced. Burning and pain with urination. \"I don't feel right\". Asking if provider can just prescribe a medication for her.  Discussion had about symptoms and recommendation for apt for evaluation by provider. Not usual practice to prescribe with out sample. Though having positive symptoms, may not be clear what is going on with out further assessment. Should leave and test sample to make sure getting correct treatment for problem. Patient agreed with plan. Writer assisted in scheduling for today at soonest availability. Will see Jennie Maynard CNP at 10:40 am today. Encouraged intake of fluids prior to apt to be able to produce urine for sample. Patient agreed with all plans.    Renetta Smith RN      "

## 2019-03-18 NOTE — PROGRESS NOTES
SUBJECTIVE:                                                    Diann John is a 87 year old female who presents to clinic today for the following health issues:      URINARY TRACT SYMPTOMS  Onset: 1 week     Description:   Painful urination (Dysuria): YES  Blood in urine (Hematuria): no   Delay in urine (Hesitency): YES    Intensity: moderate    Progression of Symptoms:  intermittent    Accompanying Signs & Symptoms:  Fever/chills: YES- feels cold today   Flank pain no   Nausea and vomiting: no   Any vaginal symptoms: none  Abdominal/Pelvic Pain: YES    History:   History of frequent UTI's: no   History of kidney stones: no   Sexually Active: no   Possibility of pregnancy: No    Precipitating factors:   Nothing     Therapies Tried and outcome: none     Last UTI May 2018, culture resistant to macrobid  Does wear panty liner  Wipes from front to back  Does not drink much water      Problem list and histories reviewed & adjusted, as indicated.  Additional history: as documented    Patient Active Problem List   Diagnosis     Macular pucker, right eye     Pseudophakia OU     Blepharitis     Hypertension goal BP (blood pressure) < 140/90     Seasonal allergies     Carotid artery stenosis, asymptomatic     PVD (posterior vitreous detachment) OU     Advanced directives, counseling/discussion     ASVD (arteriosclerotic vascular disease)     Hyperlipidemia LDL goal <70     Chronic rhinitis     Pulmonary nodules     Pulmonary emphysema, unspecified emphysema type (H)     Past Surgical History:   Procedure Laterality Date     APPENDECTOMY       C RAD RESEC TONSIL/PILLARS      Adeniods     CATARACT IOL, RT/LT Right      HAND SURGERY       NOSE SURGERY         Social History     Tobacco Use     Smoking status: Former Smoker     Last attempt to quit: 1970     Years since quittin.2     Smokeless tobacco: Never Used   Substance Use Topics     Alcohol use: No     Family History   Problem Relation Age of Onset     Cancer  Father      Glaucoma Father      Cancer Mother      Hypertension Mother      Hypertension Sister      Cerebrovascular Disease Sister      Thyroid Disease No family hx of      Macular Degeneration No family hx of          Current Outpatient Medications   Medication Sig Dispense Refill     albuterol (PROAIR HFA/PROVENTIL HFA/VENTOLIN HFA) 108 (90 Base) MCG/ACT inhaler Inhale 2 puffs into the lungs every 4 hours as needed for shortness of breath / dyspnea or wheezing 1 Inhaler 3     albuterol (PROAIR HFA/PROVENTIL HFA/VENTOLIN HFA) 108 (90 Base) MCG/ACT Inhaler Inhale 2 puffs into the lungs every 6 hours as needed for shortness of breath / dyspnea or wheezing 1 Inhaler 0     amLODIPine (NORVASC) 2.5 MG tablet TAKE ONE TABLET BY MOUTH ONCE DAILY 90 tablet 3     ascorbic acid (VITAMIN C) 1000 MG TABS Take 1,000 mg by mouth daily       aspirin 81 MG tablet Take 1 tablet (81 mg) by mouth daily 30 tablet      atorvastatin (LIPITOR) 20 MG tablet TAKE ONE TABLET BY MOUTH ONCE DAILY 90 tablet 2     Calcium-Vitamin D (CALCIUM + D PO) Take 1 tablet by mouth daily.       erythromycin (ROMYCIN) ophthalmic ointment Place into both eyes At Bedtime Apply small (1/4 inch) strip to affected eye(s) 1 Tube 3     fluticasone (FLONASE) 50 MCG/ACT spray Spray 1-2 sprays into both nostrils daily 1 Bottle 11     ipratropium (ATROVENT) 0.06 % spray Spray 1 spray into both nostrils 4 times daily as needed for rhinitis 1 Box 3     omega-3 fatty acids (FISH OIL) 1200 MG capsule Take 1 capsule by mouth daily       order for DME Equipment being ordered: BP cuff/machine 1 Device 0     sulfamethoxazole-trimethoprim (BACTRIM DS/SEPTRA DS) 800-160 MG tablet Take 1 tablet by mouth 2 times daily for 3 days 6 tablet 0     tiotropium (SPIRIVA) 18 MCG inhaled capsule Inhale 1 capsule (18 mcg) into the lungs daily 90 capsule 3     umeclidinium (INCRUSE ELLIPTA) 62.5 MCG/INH inhaler Inhale 1 puff into the lungs daily 1 Inhaler 3     VAQTA 50 UNIT/ML injection  "Inject 50 Units into the muscle once Reported on 4/4/2017       Allergies   Allergen Reactions     Codeine Other (See Comments)     Ottoniel     Lisinopril Cough     Recent Labs   Lab Test 10/25/18  1118 10/03/17  0728  09/26/16  1206 03/07/16  1555  08/15/13  0859 10/19/12   LDL 57 43  --  74  --    < > 123  --    HDL 61 68  --  57  --    < > 59  --    TRIG 54 58  --  60  --    < > 99  --    ALT  --   --   --   --  33  --  32  --    CR 0.71 0.78   < >  --  0.83   < > 0.76  --    GFRESTIMATED 78 70   < >  --  65   < > 73  --    GFRESTBLACK >90 85   < >  --  79   < > 88  --    POTASSIUM 3.9 4.0   < >  --  3.7   < > 3.8  --    TSH  --   --   --   --   --   --  3.53 2.18    < > = values in this interval not displayed.      BP Readings from Last 3 Encounters:   03/18/19 116/60   03/15/19 128/69   12/17/18 142/77    Wt Readings from Last 3 Encounters:   03/18/19 49.8 kg (109 lb 12.8 oz)   03/15/19 49.4 kg (109 lb)   12/17/18 49.8 kg (109 lb 11.2 oz)                    ROS:  Constitutional, HEENT, cardiovascular, pulmonary, gi and gu systems are negative, except as otherwise noted.    OBJECTIVE:     /60 (BP Location: Left arm, Patient Position: Chair, Cuff Size: Adult Regular)   Pulse 76   Temp 97.3  F (36.3  C) (Oral)   Ht 1.537 m (5' 0.5\")   Wt 49.8 kg (109 lb 12.8 oz)   LMP 05/10/1978 (Approximate)   SpO2 98%   BMI 21.09 kg/m    Body mass index is 21.09 kg/m .  GENERAL: healthy, alert and no distress  NECK: no adenopathy, no asymmetry, masses, or scars and thyroid normal to palpation  RESP: lungs clear to auscultation - no rales, rhonchi or wheezes  CV: regular rate and rhythm, normal S1 S2, no S3 or S4, no murmur, click or rub, no peripheral edema and peripheral pulses strong  ABDOMEN: tenderness suprapubic and mild and bowel sounds normal  MS: no gross musculoskeletal defects noted, no edema; no CVA tenderness    Diagnostic Test Results:  Results for orders placed or performed in visit on 03/18/19 (from " the past 24 hour(s))   *UA reflex to Microscopic and Culture ([facility] (except [place] and [place])   Result Value Ref Range    Color Urine Yellow     Appearance Urine Cloudy     Glucose Urine Negative NEG^Negative mg/dL    Bilirubin Urine Negative NEG^Negative    Ketones Urine Negative NEG^Negative mg/dL    Specific Gravity Urine <=1.005 1.003 - 1.035    Blood Urine Large (A) NEG^Negative    pH Urine 6.5 5.0 - 7.0 pH    Protein Albumin Urine 30 (A) NEG^Negative mg/dL    Urobilinogen Urine 0.2 0.2 - 1.0 EU/dL    Nitrite Urine Positive (A) NEG^Negative    Leukocyte Esterase Urine Large (A) NEG^Negative    Source Midstream Urine    Urine Microscopic   Result Value Ref Range    WBC Urine >100 (A) OTO5^0 - 5 /HPF    RBC Urine 10-25 (A) OTO2^O - 2 /HPF    Squamous Epithelial /LPF Urine Few FEW^Few /LPF    Bacteria Urine Moderate (A) NEG^Negative /HPF       ASSESSMENT/PLAN:     1. Acute cystitis without hematuria  Treatment as below.   - *UA reflex to Microscopic and Culture ([facility] (except [place] and [place])  - Urine Microscopic  - sulfamethoxazole-trimethoprim (BACTRIM DS/SEPTRA DS) 800-160 MG tablet; Take 1 tablet by mouth 2 times daily for 3 days  Dispense: 6 tablet; Refill: 0    2. Nonspecific finding on examination of urine  - Urine Culture Aerobic Bacterial    Patient Instructions   Take Bactrim twice a day for 3 days.      Avoid pantry liners, try for 6-8 8oz glasses of water a day    Try for urinating every 3-4 hours.  Patient Education     Bladder Infection, Female (Adult)    Urine is normally doesn't have any bacteria in it. But bacteria can get into the urinary tract from the skin around the rectum. Or they can travel in the blood from elsewhere in the body. Once they are in your urinary tract, they can cause infection in the urethra (urethritis), the bladder (cystitis), or the kidneys (pyelonephritis).  The most common place for an infection is in the bladder.  "This is called a bladder infection. This is one of the most common infections in women. Most bladder infections are easily treated. They are not serious unless the infection spreads to the kidney.  The phrases \"bladder infection,\" \"UTI,\" and \"cystitis\" are often used to describe the same thing. But they are not always the same. Cystitis is an inflammation of the bladder. The most common cause of cystitis is an infection.  Symptoms  The infection causes inflammation in the urethra and bladder. This causes many of the symptoms. The most common symptoms of a bladder infection are:    Pain or burning when urinating    Having to urinate more often than usual    Urgent need to urinate    Only a small amount of urine comes out    Blood in urine    Abdominal discomfort. This is usually in the lower abdomen above the pubic bone.    Cloudy urine    Strong- or bad-smelling urine    Unable to urinate (urinary retention)    Unable to hold urine in (urinary incontinence)    Fever    Loss of appetite    Confusion (in older adults)  Causes  Bladder infections are not contagious. You can't get one from someone else, from a toilet seat, or from sharing a bath.  The most common cause of bladder infections is bacteria from the bowels. The bacteria get onto the skin around the opening of the urethra. From there, they can get into the urine and travel up to the bladder, causing inflammation and infection. This usually happens because of:    Wiping improperly after urinating. Always wipe from front to back.    Bowel incontinence    Pregnancy    Procedures such as having a catheter inserted    Older age    Not emptying your bladder. This can allow bacteria a chance to grow in your urine.    Dehydration    Constipation    Sex    Use of a diaphragm for birth control   Treatment  Bladder infections are diagnosed by a urine test. They are treated with antibiotics and usually clear up quickly without complications. Treatment helps prevent a " more serious kidney infection.  Medicines  Medicines can help in the treatment of a bladder infection:    Take antibiotics until they are used up, even if you feel better. It is important to finish them to make sure the infection has cleared.    You can use acetaminophen or ibuprofen for pain, fever, or discomfort, unless another medicine was prescribed. If you have chronic liver or kidney disease, talk with your healthcare provider before using these medicines. Also talk with your provider if you've ever had a stomach ulcer or gastrointestinal bleeding, or are taking blood-thinner medicines.    If you are given phenazopydridine to reduce burning with urination, it will cause your urine to become a bright orange color. This can stain clothing.  Care and prevention  These self-care steps can help prevent future infections:    Drink plenty of fluids to prevent dehydration and flush out your bladder. Do this unless you must restrict fluids for other health reasons, or your doctor told you not to.    Proper cleaning after going to the bathroom is important. Wipe from front to back after using the toilet to prevent the spread of bacteria.    Urinate more often. Don't try to hold urine in for a long time.    Wear loose-fitting clothes and cotton underwear. Avoid tight-fitting pants.    Improve your diet and prevent constipation. Eat more fresh fruit and vegetables, and fiber, and less junk and fatty foods.    Avoid sex until your symptoms are gone.    Avoid caffeine, alcohol, and spicy foods. These can irritate your bladder.    Urinate right after intercourse to flush out your bladder.    If you use birth control pills and have frequent bladder infections, discuss it with your doctor.  Follow-up care  Call your healthcare provider if all symptoms are not gone after 3 days of treatment. This is especially important if you have repeat infections.  If a culture was done, you will be told if your treatment needs to be  changed. If directed, you can call to find out the results.  If X-rays were done, you will be told if the results will affect your treatment.  Call 911  Call 911 if any of the following occur:    Trouble breathing    Hard to wake up or confusion    Fainting or loss of consciousness    Rapid heart rate  When to seek medical advice  Call your healthcare provider right away if any of these occur:    Fever of 100.4 F (38.0 C) or higher, or as directed by your healthcare provider    Symptoms are not better by the third day of treatment    Back or belly (abdominal) pain that gets worse    Repeated vomiting, or unable to keep medicine down    Weakness or dizziness    Vaginal discharge    Pain, redness, or swelling in the outer vaginal area (labia)  Date Last Reviewed: 10/1/2016    9575-4962 [employer]. [address] [place] [address], [place] [address]. All rights reserved. This information is not intended as a substitute for professional medical care. Always follow your healthcare professional's instructions.               YOBANY Nuno [facility]

## 2019-03-19 LAB
BACTERIA SPEC CULT: ABNORMAL
SPECIMEN SOURCE: ABNORMAL

## 2019-04-23 ENCOUNTER — MEDICAL CORRESPONDENCE (OUTPATIENT)
Dept: HEALTH INFORMATION MANAGEMENT | Facility: CLINIC | Age: 84
End: 2019-04-23

## 2019-04-23 ENCOUNTER — TELEPHONE (OUTPATIENT)
Dept: FAMILY MEDICINE | Facility: CLINIC | Age: 84
End: 2019-04-23

## 2019-04-23 NOTE — TELEPHONE ENCOUNTER
This writer attempted to contact Diann on 04/23/19    Reason for call stool test order and left message to return call-need more information to confirm what type of stool test she is requesting.    If it is test for colon cancer screening then please go over information from provider.    When patient calls back, please contact 1st floor Jenn Osei. routine priority.        Yash Villalobos

## 2019-04-23 NOTE — TELEPHONE ENCOUNTER
Called patient she states 9 years ago last had colonoscopy showing polyps but was not removed. Patient is concerned due to this as another provider mentioned to her that they would have removed the polyps. Also sister just passed away 2 weeks ago from colon cancer.    Patient states Dr Finley gave north a form to fill out and return back to our clinic for the cologuard but she doesn't know how to answer some of them and needs help. She questions what the price will be and also whether it will be covered because of her age.    FYI - Dr Finley I scheduled patient for a ancillary appointment with myself after your schedule this Thursday to help her with her paperwork. I told her I don't know how it looks like, will have to follow up with Dr Tushar Villalobos CMA

## 2019-04-23 NOTE — TELEPHONE ENCOUNTER
Reason for Call:  Other     Detailed comments: [facility] called and patient they needs a Prior auth for DNA stool test.    Phone Number Patient can be reached at: Home number on file [phone] (home)    Best Time: any    Can we leave a detailed message on this number? YES    Call taken on 4/23/2019 at 9:24 AM by Pascale Lee

## 2019-04-23 NOTE — TELEPHONE ENCOUNTER
I think this is in regards to a cologuard stool test that patient requested. I have not seen any requests for PA. Probably not appropriate for patient at her age. Not indicated for screening exam. I do not recommend this.   Esmer Finley MD

## 2019-04-23 NOTE — TELEPHONE ENCOUNTER
Reason for Call:  Other Order    Detailed comments: Pt calling to confirm for she would like to put in a request to have a Cologuard stool test done.  She said Dr. Finley gave Pt form with the order on form.      Phone Number Patient can be reached at: Home number on file [phone] (home)    Best Time: anytime    Can we leave a detailed message on this number? YES    Call taken on 4/23/2019 at 3:40 PM by Barrera Contreras

## 2019-04-23 NOTE — TELEPHONE ENCOUNTER
I can review the form also and she does not need to see me. Will check on the cost and try to do a PA.  Esmer Finley MD

## 2019-04-25 PROBLEM — Z80.0 FAMILY HISTORY OF COLON CANCER: Status: ACTIVE | Noted: 2019-04-25

## 2019-04-26 NOTE — TELEPHONE ENCOUNTER
Called patient. She states called the phone number on the form for cologuard and they want the form faxed. Told patient can fax the form for her. Form faxed.    Yash Villalobos CMA

## 2019-04-26 NOTE — TELEPHONE ENCOUNTER
Patient is checking on status  She brought the form in yesterday    Call patient to advise if it was faxed in for her  Fax to [phone] ok to leave confirmation on her voicemail

## 2019-05-02 DIAGNOSIS — I10 ESSENTIAL HYPERTENSION WITH GOAL BLOOD PRESSURE LESS THAN 140/90: ICD-10-CM

## 2019-05-02 DIAGNOSIS — I65.22 CAROTID ARTERY STENOSIS, ASYMPTOMATIC, LEFT: ICD-10-CM

## 2019-05-02 NOTE — TELEPHONE ENCOUNTER
"Requested Prescriptions   Pending Prescriptions Disp Refills     atorvastatin (LIPITOR) 20 MG tablet [Pharmacy Med Name: ATORVASTATIN 20MG   TAB]  Last Written Prescription Date:  08/20/18  Last Fill Quantity: 90,  # refills: 2   Last Office Visit with SHARON, YUMIKO or [facility] prescribing provider:  03/18/19Jake   Future Office Visit:    90 tablet 2     Sig: TAKE 1 TABLET BY MOUTH ONCE DAILY       Statins Protocol Passed - 5/2/2019  9:14 AM        Passed - LDL on file in past 12 months     Recent Labs   Lab Test 10/25/18  1118   LDL 57             Passed - No abnormal creatine kinase in past 12 months     No lab results found.             Passed - Recent (12 mo) or future (30 days) visit within the authorizing provider's specialty     Patient had office visit in the last 12 months or has a visit in the next 30 days with authorizing provider or within the authorizing provider's specialty.  See \"Patient Info\" tab in inbasket, or \"Choose Columns\" in Meds & Orders section of the refill encounter.              Passed - Medication is active on med list        Passed - Patient is age 18 or older        Passed - No active pregnancy on record        Passed - No positive pregnancy test in past 12 months          "

## 2019-05-02 NOTE — TELEPHONE ENCOUNTER
"Requested Prescriptions   Pending Prescriptions Disp Refills     amLODIPine (NORVASC) 2.5 MG tablet      Last Written Prescription Date:  8/16/18  Last Fill Quantity: 90,  # refills: 3   Last Office Visit with [facility], [facility] or [facility] prescribing provider:  3/18/19   Future Office Visit:      90 tablet 3     Sig: Take 1 tablet (2.5 mg) by mouth daily       Calcium Channel Blockers Protocol  Passed - 5/2/2019  3:59 PM        Passed - Blood pressure under 140/90 in past 12 months     BP Readings from Last 3 Encounters:   03/18/19 116/60   03/15/19 128/69   12/17/18 142/77                 Passed - Recent (12 mo) or future (30 days) visit within the authorizing provider's specialty     Patient had office visit in the last 12 months or has a visit in the next 30 days with authorizing provider or within the authorizing provider's specialty.  See \"Patient Info\" tab in inbasket, or \"Choose Columns\" in Meds & Orders section of the refill encounter.              Passed - Medication is active on med list        Passed - Patient is age 18 or older        Passed - No active pregnancy on record        Passed - Normal serum creatinine on file in past 12 months     Recent Labs   Lab Test 10/25/18  1118  09/11/17  1522 08/23/17   CR 0.71   < >  --   --    CREAT  --   --  0.8  --    CRPOC  --   --   --  0.9    < > = values in this interval not displayed.             Passed - No positive pregnancy test in past 12 months              Haresh Faarax  [facility]  "

## 2019-05-02 NOTE — TELEPHONE ENCOUNTER
Reason for Call:  Medication or medication refill:    Do you use a [facility]?No      Name of the pharmacy and phone number [facility] the [facility] [place]    Name of the medication requested: Amlodipine 2.5    Other request: n/a    Can we leave a detailed message on this number? YES    Phone number patient can be reached at: Cell number on file:    Telephone Information:   Mobile [phone]       Best Time: anytime    Call taken on 5/2/2019 at 3:56 PM by Jadyn Levine

## 2019-05-06 DIAGNOSIS — J43.2 CENTRILOBULAR EMPHYSEMA (H): ICD-10-CM

## 2019-05-06 DIAGNOSIS — R06.00 DYSPNEA, UNSPECIFIED TYPE: ICD-10-CM

## 2019-05-06 RX ORDER — AMLODIPINE BESYLATE 2.5 MG/1
2.5 TABLET ORAL DAILY
Qty: 90 TABLET | Refills: 3
Start: 2019-05-06

## 2019-05-06 RX ORDER — ATORVASTATIN CALCIUM 20 MG/1
TABLET, FILM COATED ORAL
Qty: 90 TABLET | Refills: 2 | Status: SHIPPED | OUTPATIENT
Start: 2019-05-06 | End: 2020-02-04

## 2019-05-06 NOTE — TELEPHONE ENCOUNTER
90 day supply with 3 refills sent on 8/16/18. Should have refill on file at pharmacy. Too soon to refill.       Gabriel Thompson RN, BSN, PHN

## 2019-05-06 NOTE — TELEPHONE ENCOUNTER
Routing refill request to provider for review/approval because:  Associated diagnosis is not on the [facility] refill protocol.     Myrna Bosch RN, BSN

## 2019-05-16 ENCOUNTER — RECORDS - HEALTHEAST (OUTPATIENT)
Dept: ADMINISTRATIVE | Facility: OTHER | Age: 84
End: 2019-05-16

## 2019-05-16 ENCOUNTER — OFFICE VISIT - HEALTHEAST (OUTPATIENT)
Dept: ALLERGY | Facility: CLINIC | Age: 84
End: 2019-05-16

## 2019-05-16 ENCOUNTER — COMMUNICATION - HEALTHEAST (OUTPATIENT)
Dept: ADMINISTRATIVE | Facility: CLINIC | Age: 84
End: 2019-05-16

## 2019-05-16 DIAGNOSIS — R05.9 COUGH: ICD-10-CM

## 2019-05-16 DIAGNOSIS — R09.82 POST-NASAL DRAINAGE: ICD-10-CM

## 2019-05-16 DIAGNOSIS — Z01.82 ENCOUNTER FOR ALLERGY TESTING: ICD-10-CM

## 2019-05-16 ASSESSMENT — MIFFLIN-ST. JEOR: SCORE: 856.8

## 2019-06-03 ENCOUNTER — TRANSFERRED RECORDS (OUTPATIENT)
Dept: HEALTH INFORMATION MANAGEMENT | Facility: CLINIC | Age: 84
End: 2019-06-03

## 2019-06-03 LAB — COLOGUARD-ABSTRACT: POSITIVE

## 2019-06-24 ENCOUNTER — TRANSFERRED RECORDS (OUTPATIENT)
Dept: HEALTH INFORMATION MANAGEMENT | Facility: CLINIC | Age: 84
End: 2019-06-24

## 2019-06-27 ENCOUNTER — OFFICE VISIT (OUTPATIENT)
Dept: FAMILY MEDICINE | Facility: CLINIC | Age: 84
End: 2019-06-27
Payer: COMMERCIAL

## 2019-06-27 VITALS
HEIGHT: 61 IN | HEART RATE: 80 BPM | OXYGEN SATURATION: 97 % | TEMPERATURE: 97.6 F | RESPIRATION RATE: 24 BRPM | DIASTOLIC BLOOD PRESSURE: 60 MMHG | BODY MASS INDEX: 20.65 KG/M2 | WEIGHT: 109.4 LBS | SYSTOLIC BLOOD PRESSURE: 130 MMHG

## 2019-06-27 DIAGNOSIS — J43.9 PULMONARY EMPHYSEMA, UNSPECIFIED EMPHYSEMA TYPE (H): ICD-10-CM

## 2019-06-27 DIAGNOSIS — I10 HYPERTENSION GOAL BP (BLOOD PRESSURE) < 140/90: ICD-10-CM

## 2019-06-27 DIAGNOSIS — E78.5 HYPERLIPIDEMIA LDL GOAL <70: ICD-10-CM

## 2019-06-27 DIAGNOSIS — H53.8 BLURRED VISION: Primary | ICD-10-CM

## 2019-06-27 DIAGNOSIS — J30.2 SEASONAL ALLERGIES: ICD-10-CM

## 2019-06-27 DIAGNOSIS — H35.371 MACULAR PUCKER, RIGHT EYE: ICD-10-CM

## 2019-06-27 DIAGNOSIS — I65.22 ASYMPTOMATIC STENOSIS OF LEFT CAROTID ARTERY: ICD-10-CM

## 2019-06-27 PROCEDURE — 99214 OFFICE O/P EST MOD 30 MIN: CPT | Performed by: FAMILY MEDICINE

## 2019-06-27 ASSESSMENT — PAIN SCALES - GENERAL: PAINLEVEL: NO PAIN (0)

## 2019-06-27 ASSESSMENT — MIFFLIN-ST. JEOR: SCORE: 860.68

## 2019-06-27 NOTE — PROGRESS NOTES
Subjective     Diann John is a 87 year old female who presents to clinic today for the following health issues:    HPI   ED/UC Followup:    Facility:  [facility]  Date of visit: 06/25/2019 and 06/24/2019  Reason for visit: Blurred Vision  Current Status: Patient states that she is doing okay since her visits to the ER. No more episodes. Cause not found.      Impression and Plan:  Diann John is a 87 y.o. female with a past medical history significant for hypertension and carotid stenosis who comes to the emergency department with brief episodes of blurred vision, as well as a current headache. Concern about possible temporal arteritis, progression of carotid stenosis, electrolyte abnormalities. ESR is normal and CRP is only mildly elevated at 0.82. This seems unlikely to represent temporal arteritis. Her headache had already resolved even prior to getting the Reglan. Laboratory evaluation is significant, however, for white blood cell count of 2.9. She has no specific infectious signs at this time. I recommended very close primary care physician follow up. I spoke with her again about her symptoms and she does believe that they happen mostly when she sits up form having slept at night. I recommended that she keep a journal of her symptoms duration and provocations of her symptoms. Specific reasons for which to return to the emergency department were described. All of her questions were answered to the best of my ability and she and her daughter voiced their confident understanding with the plan of care.     Diagnosis:   ICD-10-CM   1. Blurry vision, bilateral H53.8   2. Leukopenia, unspecified type D72.819     Laboratory:   CBC and Differential: WBC 3.5 (low), Lymph absolute 0.89 (low), o/w WNL (HGB 13.6, )  Basic Metabolic Panel: Na 133 (low), Glu 128 (high), o/w WNL (Cr 0.76)    Imaging:   MRI/MRA Brain Without Contrast:  1. Normal MRI of the brain for age.   Reading per  "radiology     MDM:   Diann John is a 87 y.o. female with a history of hypertension who presents to the emergency department for evaluation of vision changes and transient headache. Her vital signs include her blood pressure in the low 80s. On exam, she is very well-appearing. She has no focal or neurologic deficits. She has had multiple episodes over the last few nights where she wakes up feeling \"unfocused\" with vision changes. I reviewed her workup from [facility], which included a CT of the head as well as a CTA of the head and neck. Aside from moderate, re-demonstrated carotid stenosis, her imaging was found to be unremarkable. We did obtain an MRI here which revealed no acute abnormalities. Her labs, including a CBC and a basic metabolic panel, are unremarkable aside from mild leukopenia; the patient was already aware on this. She remains excellent appearing on exam and otherwise has no ongoing symptoms. At this time, I think she is reasonable for outpatient patient management. She is accompanied by her daughter who feels comfortable as planned. I have asked that they follow up with her PCP for evaluation at the next available appointment, and they are comfortable as planned. Return precautions were discussed, and the patient was discharged in stable condition.     DIAGNOSIS:   ICD-10-CM   1. Vision changes H53.9   2. Leukopenia, unspecified type D72.819         Hyperlipidemia Follow-Up      Are you having any of the following symptoms? (Select all that apply)  No complaints of shortness of breath, chest pain or pressure.  No increased sweating or nausea with activity.  No left-sided neck or arm pain.  No complaints of pain in calves when walking 1-2 blocks.    Are you regularly taking any medication or supplement to lower your cholesterol?   Yes- statin    Are you having muscle aches or other side effects that you think could be caused by your cholesterol lowering medication?  No      Hypertension " Follow-up      Do you check your blood pressure regularly outside of the clinic? No     Are you following a low salt diet? Yes    Are your blood pressures ever more than 140 on the top number (systolic) OR more   than 90 on the bottom number (diastolic), for example 140/90? No  Vascular Disease Follow-up      Are you having any of the following symptoms? (Select all that apply) No complaints of shortness of breath, chest pain or pressure.  No increased sweating or nausea with activity.  No left-sided neck or arm pain.  No complaints of pain in calves when walking 1-2 blocks.    How often do you take nitroglycerin? Never    Do you take an aspirin every day? Yes    COPD Follow-Up    Overall, how are your COPD symptoms since your last clinic visit?  No change    How much fatigue or shortness of breath do you have when you are walking?  None    How much shortness of breath do you have when you are resting?  None    How often do you cough? Rarely    Have you noticed any change in your sputum/phlegm?  No    Have you experienced a recent fever? No    Please describe how far you can walk without stopping to rest:  The length of 1-2 rooms    How many flights of stairs are you able to walk up without stopping?  1    Have you had any Emergency Room Visits, Urgent Care Visits, or Hospital Admissions because of your COPD since your last office visit?  No    History   Smoking Status     Former Smoker     Quit date: 1/1/1970   Smokeless Tobacco     Never Used     No results found for: FEV1, JQS2RLX    Patient Active Problem List   Diagnosis     Macular pucker, right eye     Pseudophakia OU     Blepharitis     Hypertension goal BP (blood pressure) < 140/90     Seasonal allergies     Carotid artery stenosis, asymptomatic     PVD (posterior vitreous detachment) OU     Advanced directives, counseling/discussion     ASVD (arteriosclerotic vascular disease)     Hyperlipidemia LDL goal <70     Chronic rhinitis     Pulmonary nodules      Pulmonary emphysema, unspecified emphysema type (H)     Family history of colon cancer     Past Surgical History:   Procedure Laterality Date     APPENDECTOMY       C RAD RESEC TONSIL/PILLARS      Adeniods     CATARACT IOL, RT/LT Right      HAND SURGERY       NOSE SURGERY         Social History     Tobacco Use     Smoking status: Former Smoker     Last attempt to quit: 1970     Years since quittin.5     Smokeless tobacco: Never Used   Substance Use Topics     Alcohol use: No     Family History   Problem Relation Age of Onset     Cancer Father      Glaucoma Father      Cancer Mother      Hypertension Mother      Hypertension Sister      Cerebrovascular Disease Sister      Thyroid Disease No family hx of      Macular Degeneration No family hx of          Current Outpatient Medications   Medication Sig Dispense Refill     amLODIPine (NORVASC) 2.5 MG tablet TAKE ONE TABLET BY MOUTH ONCE DAILY 90 tablet 3     ascorbic acid (VITAMIN C) 1000 MG TABS Take 1,000 mg by mouth daily       aspirin 81 MG tablet Take 1 tablet (81 mg) by mouth daily 30 tablet      atorvastatin (LIPITOR) 20 MG tablet TAKE 1 TABLET BY MOUTH ONCE DAILY 90 tablet 2     Calcium-Vitamin D (CALCIUM + D PO) Take 1 tablet by mouth daily.       fluticasone (FLONASE) 50 MCG/ACT spray Spray 1-2 sprays into both nostrils daily 1 Bottle 11     omega-3 fatty acids (FISH OIL) 1200 MG capsule Take 1 capsule by mouth daily       order for DME Equipment being ordered: BP cuff/machine 1 Device 0     umeclidinium (INCRUSE ELLIPTA) 62.5 MCG/INH inhaler Inhale 1 puff into the lungs daily 1 Inhaler 3     VAQTA 50 UNIT/ML injection Inject 50 Units into the muscle once Reported on 2017       albuterol (PROAIR HFA/PROVENTIL HFA/VENTOLIN HFA) 108 (90 Base) MCG/ACT inhaler Inhale 2 puffs into the lungs every 4 hours as needed for shortness of breath / dyspnea or wheezing (Patient not taking: Reported on 2019) 1 Inhaler 3     albuterol (PROAIR HFA/PROVENTIL  HFA/VENTOLIN HFA) 108 (90 Base) MCG/ACT Inhaler Inhale 2 puffs into the lungs every 6 hours as needed for shortness of breath / dyspnea or wheezing (Patient not taking: Reported on 6/27/2019) 1 Inhaler 0     erythromycin (ROMYCIN) ophthalmic ointment Place into both eyes At Bedtime Apply small (1/4 inch) strip to affected eye(s) (Patient not taking: Reported on 6/27/2019) 1 Tube 3     ipratropium (ATROVENT) 0.06 % spray Spray 1 spray into both nostrils 4 times daily as needed for rhinitis (Patient not taking: Reported on 6/27/2019) 1 Box 3     tiotropium (SPIRIVA) 18 MCG inhaled capsule Inhale 1 capsule (18 mcg) into the lungs daily (Patient not taking: Reported on 6/27/2019) 90 capsule 3     Allergies   Allergen Reactions     Codeine Other (See Comments)     Heaaches     Lisinopril Cough     Recent Labs   Lab Test 10/25/18  1118 10/03/17  0728  09/26/16  1206 03/07/16  1555  08/15/13  0859 10/19/12   LDL 57 43  --  74  --    < > 123  --    HDL 61 68  --  57  --    < > 59  --    TRIG 54 58  --  60  --    < > 99  --    ALT  --   --   --   --  33  --  32  --    CR 0.71 0.78   < >  --  0.83   < > 0.76  --    GFRESTIMATED 78 70   < >  --  65   < > 73  --    GFRESTBLACK >90 85   < >  --  79   < > 88  --    POTASSIUM 3.9 4.0   < >  --  3.7   < > 3.8  --    TSH  --   --   --   --   --   --  3.53 2.18    < > = values in this interval not displayed.      BP Readings from Last 3 Encounters:   06/27/19 130/60   03/18/19 116/60   03/15/19 128/69    Wt Readings from Last 3 Encounters:   06/27/19 49.6 kg (109 lb 6.4 oz)   03/18/19 49.8 kg (109 lb 12.8 oz)   03/15/19 49.4 kg (109 lb)                      Reviewed and updated as needed this visit by Provider         Review of Systems   ROS COMP: Constitutional, HEENT, cardiovascular, pulmonary, gi and gu systems are negative, except as otherwise noted.      Objective    /60 (BP Location: Left arm, Patient Position: Sitting, Cuff Size: Adult Regular)   Pulse 80   Temp 97.6  " F (36.4  C) (Oral)   Resp 24   Ht 1.537 m (5' 0.5\")   Wt 49.6 kg (109 lb 6.4 oz)   LMP 05/10/1978 (Approximate)   SpO2 97%   Breastfeeding? No   BMI 21.01 kg/m    Body mass index is 21.01 kg/m .  Physical Exam   GENERAL: elderly, alert, well nourished, well hydrated, no distress  HENT: ear canals- normal; TMs- normal; Nose- normal; Mouth- no ulcers, no lesions, missing dentition  NECK: no tenderness, no adenopathy, no asymmetry, no masses, no stiffness; thyroid- normal to palpation  RESP: lungs clear to auscultation - no rales, no rhonchi, no wheezes  CV: regular rates and rhythm, normal S1 S2, no S3 or S4 and no murmur, no click or rub, normal pulses  ABDOMEN: soft, no tenderness, no  hepatosplenomegaly, no masses, normal bowel sounds  MS: extremities- no gross deformities noted, no edema  SKIN: no suspicious lesions, no rashes, age related skin changes with seborrheic keratosis and no actinic keratosis.    NEURO: strength and tone- decreased, sensory exam- grossly normal, reflexes- symmetric  BACK: no CVA tenderness, no paralumbar tenderness  MENTAL STATUS EXAM:  Appearance/Behavior: no apparent distress, neatly groomed, dressed appropriately for weather, appears stated age and is frail-appearing  Speech: normal  Mood/Affect: normal affect  Insight: Good     Diagnostic Test Results:  Labs reviewed in Epic  Results for orders placed or performed in visit on 06/03/19   ABSTRACT COLOGUARD-NO CHARGE   Result Value Ref Range    COLOGUARD-ABSTRACT Positive (A)     Narrative    COLOGUARD [employer]           Assessment & Plan       ICD-10-CM    1. Blurred vision H53.8 Symptoms resolved and CT scan MRI scan head normal. No change in carotid stenosis and this may have been a factor.   2. Hypertension goal BP (blood pressure) < 140/90 I10 Well controlled on medications    3. Hyperlipidemia LDL goal <70 E78.5 Stable    4. Asymptomatic stenosis of left carotid artery I65.22 May have caused symptoms    5. Seasonal " allergies J30.2 No specific allergins per allergist and symptoms are now improved.   6. Pulmonary emphysema, unspecified emphysema type (H) J43.9 Stable and not smoking   7. Macular pucker, right eye H35.371 Follow up with eye doctor          CONSULTATION/REFERRAL to ophthalmology as scheduled. Gastroenterology for follow up abnormal stool test  FUTURE LABS:       - Schedule fasting labs in 6 months  FUTURE APPOINTMENTS:       - Follow-up visit in 6 months or sooner if any questions or concerns.   See Patient Instructions    No follow-ups on file.    Esmer Finley MD  [facility]

## 2019-06-28 NOTE — PATIENT INSTRUCTIONS
Patient Education     How the Eye Works    Sharp vision depends on many factors. The parts of the eye work together to refract, or bend, and focus light rays. For normal vision, light must focus onto the retina.   Cornea  Light enters the eye through this clear, dome-shaped tissue. The cornea also bends light rays to help focus them. Problems with the cornea's shape can affect vision.  Pupil  This circular window in the center of the iris opens and closes to let the right amount of light into the eye.  Iris  This is the colored part of the eye. It contains muscles that dilate or open, and constrict or close, the pupil.  Lens  This disk of clear tissue behind the pupil changes shape to help focus light.  Retina  This thin layer of light-sensitive tissue lines the inside of the eye. The retina sends signals to the optic nerve.  Optic nerve  This nerve carries signals from the retina to the brain. The brain then interprets these signals to make images. These images are what you see.  Date Last Reviewed: 10/1/2017    [id number] [employer]. [address] [place] [address]. All rights reserved. This information is not intended as a substitute for professional medical care. Always follow your healthcare professional's instructions.           Patient Education     Understanding Nasal Anatomy: Inside View  A lot happens under the surface of the nose. The bone and cartilage under the skin give the nose most of its size and shape. Other structures inside and behind the nose help you breathe. Learning the anatomy of the nose can help you better understand how the nose works.           Bone supports the upper third (bridge) of the nose. The upper cartilage supports the side of the nose. The lower cartilage adds support, width, and height. It helps shape the nostrils and the tip of the nose. Skin also helps shape the nose.     The nasal cavity is a hollow space behind the nose that air flows through. The  "septum is a thin \"wall\" made of cartilage and bone. It divides the inside of the nose into two chambers. The mucous membrane is thin tissue that lines the nose, sinuses, and throat. It warms and moistens the air you breathe in. It also makes the sticky mucus that helps clean the air of dust and other small particles. The turbinates on each side of the nose are curved, bony ridges lined with mucous membrane. They warm and moisten the air you breathe in. The sinuses are hollow, air-filled chambers in the bone around your nose. Mucus from the sinuses drains into the nasal cavity.  Date Last Reviewed: 11/1/2016 2000-2018 [employer]. [address] [place] [address], [place] [address]. All rights reserved. This information is not intended as a substitute for professional medical care. Always follow your healthcare professional's instructions.           "

## 2019-07-02 ENCOUNTER — OFFICE VISIT (OUTPATIENT)
Dept: GASTROENTEROLOGY | Facility: CLINIC | Age: 84
End: 2019-07-02
Payer: COMMERCIAL

## 2019-07-02 VITALS
DIASTOLIC BLOOD PRESSURE: 75 MMHG | HEART RATE: 70 BPM | WEIGHT: 108.9 LBS | OXYGEN SATURATION: 100 % | BODY MASS INDEX: 20.56 KG/M2 | SYSTOLIC BLOOD PRESSURE: 137 MMHG | HEIGHT: 61 IN

## 2019-07-02 DIAGNOSIS — Z80.0 FAMILY HISTORY OF COLON CANCER: ICD-10-CM

## 2019-07-02 DIAGNOSIS — R19.5 POSITIVE COLORECTAL CANCER SCREENING USING COLOGUARD TEST: Primary | ICD-10-CM

## 2019-07-02 PROCEDURE — 99214 OFFICE O/P EST MOD 30 MIN: CPT | Performed by: PHYSICIAN ASSISTANT

## 2019-07-02 ASSESSMENT — MIFFLIN-ST. JEOR: SCORE: 858.41

## 2019-07-02 ASSESSMENT — PAIN SCALES - GENERAL: PAINLEVEL: NO PAIN (0)

## 2019-07-02 NOTE — PROGRESS NOTES
GASTROENTEROLOGY NEW PATIENT CLINIC VISIT    CC/REFERRING MD:    Esmer Finley      REASON FOR CONSULTATION:  Consult (Positive Cologuard Test)      HISTORY OF PRESENT ILLNESS:    Diann John is 87 year old female with pmhx of left carotid stenosis, HTN and pulmonary emphysema presents for evaluation of positive Cologuard test.    She is very concerned about a positive Cologuard result and would like to discuss next steps.  Her last colonoscopy was in 2010 which revealed a 3 mm sessile serrated adenomatous cecal polyp that was removed.  She was also noted to have a tortuous sigmoid colon with small mouthed diverticula. She was advised at that time that she did not need a repeat colonoscopy due to her age. More recently her sister was diagnosed with colon cancer and passed away this past 2019. This alarmed patient and prompted her to get checked with cologuard stool test.     She notes that her bowel movements have been regular. She denies any diarrhea or constipation at this time although her history tends to favor constipation. She denies any rectal bleeding or black tarry stools. She does not have any abdominal pain or rectal pain. She has no problems eating or swallowing foods. No nausea, vomiting or heartburn.     Her weight has remained stable. She is fairly active and normally walks about 9 miles a week. She has tapered this down to 7 miles a week as she is not trying to lose weight just remain healthy.     She was a previous smoker for 30 years, less tan 1ppd however quit once she saw that her late  developed lung cancer.     Her family history besides colon cancer in her sister is also significant for bile duct cancer in her mother.       PROBLEM LIST  Patient Active Problem List    Diagnosis Date Noted     Family history of colon cancer 2019     Priority: Medium     Sister  of colon cancer in her 90's       Pulmonary emphysema, unspecified emphysema type (H)  10/25/2018     Priority: Medium     Pulmonary nodules 08/28/2017     Priority: Medium     ASVD (arteriosclerotic vascular disease) 06/01/2017     Priority: Medium     Hyperlipidemia LDL goal <70 06/01/2017     Priority: Medium     Chronic rhinitis 06/01/2017     Priority: Medium     Advanced directives, counseling/discussion 09/10/2015     Priority: Medium     Advance Care Planning 9/10/2015: ACP Review and Resources Provided:  Reviewed chart for advance care plan.  Diann John has no plan or code status on file. Discussed available resources and provided with information on 09/04/2015. Confirmed code status reflects current choices pending further ACP discussions.  Confirmed/documented legally designated decision maker(s). Added by Alethea Keene         PVD (posterior vitreous detachment) OU 06/10/2014     Priority: Medium     Carotid artery stenosis, asymptomatic 06/09/2014     Priority: Medium     Hypertension goal BP (blood pressure) < 140/90 08/15/2013     Priority: Medium     Seasonal allergies 08/15/2013     Priority: Medium     Macular pucker, right eye 11/18/2010     Priority: Medium     Pseudophakia OU 11/18/2010     Priority: Medium     Blepharitis 11/18/2010     Priority: Medium       PERTINENT PAST MEDICAL HISTORY:  (I personally reviewed this history with the patient at today's visit)   Past Medical History:   Diagnosis Date     Hypertension      Nonsenile cataract      Postherpetic neuralgia      Shingles     3 x      Steroid responders 5/28/2014         PREVIOUS SURGERIES: (I personally reviewed this history with the patient at today's visit)   Past Surgical History:   Procedure Laterality Date     APPENDECTOMY       C RAD RESEC TONSIL/PILLARS      Adeniods     CATARACT IOL, RT/LT Right      HAND SURGERY       NOSE SURGERY           ALLERGIES:     Allergies   Allergen Reactions     Codeine Other (See Comments)     [place]     Lisinopril Cough       PERTINENT MEDICATIONS:    Current Outpatient  Medications:      amLODIPine (NORVASC) 2.5 MG tablet, TAKE ONE TABLET BY MOUTH ONCE DAILY, Disp: 90 tablet, Rfl: 3     ascorbic acid (VITAMIN C) 1000 MG TABS, Take 1,000 mg by mouth daily, Disp: , Rfl:      aspirin 81 MG tablet, Take 1 tablet (81 mg) by mouth daily, Disp: 30 tablet, Rfl:      atorvastatin (LIPITOR) 20 MG tablet, TAKE 1 TABLET BY MOUTH ONCE DAILY, Disp: 90 tablet, Rfl: 2     Calcium-Vitamin D (CALCIUM + D PO), Take 1 tablet by mouth daily., Disp: , Rfl:      fluticasone (FLONASE) 50 MCG/ACT spray, Spray 1-2 sprays into both nostrils daily, Disp: 1 Bottle, Rfl: 11     omega-3 fatty acids (FISH OIL) 1200 MG capsule, Take 1 capsule by mouth daily, Disp: , Rfl:      umeclidinium (INCRUSE ELLIPTA) 62.5 MCG/INH inhaler, Inhale 1 puff into the lungs daily, Disp: 1 Inhaler, Rfl: 3     albuterol (PROAIR HFA/PROVENTIL HFA/VENTOLIN HFA) 108 (90 Base) MCG/ACT inhaler, Inhale 2 puffs into the lungs every 4 hours as needed for shortness of breath / dyspnea or wheezing (Patient not taking: Reported on 6/27/2019), Disp: 1 Inhaler, Rfl: 3     albuterol (PROAIR HFA/PROVENTIL HFA/VENTOLIN HFA) 108 (90 Base) MCG/ACT Inhaler, Inhale 2 puffs into the lungs every 6 hours as needed for shortness of breath / dyspnea or wheezing (Patient not taking: Reported on 6/27/2019), Disp: 1 Inhaler, Rfl: 0     erythromycin (ROMYCIN) ophthalmic ointment, Place into both eyes At Bedtime Apply small (1/4 inch) strip to affected eye(s) (Patient not taking: Reported on 6/27/2019), Disp: 1 Tube, Rfl: 3     ipratropium (ATROVENT) 0.06 % spray, Spray 1 spray into both nostrils 4 times daily as needed for rhinitis (Patient not taking: Reported on 6/27/2019), Disp: 1 Box, Rfl: 3     order for DME, Equipment being ordered: BP cuff/machine (Patient not taking: Reported on 7/2/2019), Disp: 1 Device, Rfl: 0     tiotropium (SPIRIVA) 18 MCG inhaled capsule, Inhale 1 capsule (18 mcg) into the lungs daily (Patient not taking: Reported on 6/27/2019),  Disp: 90 capsule, Rfl: 3     VAQTA 50 UNIT/ML injection, Inject 50 Units into the muscle once Reported on 2017, Disp: , Rfl:   No current facility-administered medications for this visit.     Facility-Administered Medications Ordered in Other Visits:      DOBUTamine 500 mg in dextrose 5% 250 mL (adult std), 15 mcg/kg/min, Intravenous, Continuous, Tameka Romo PA-C    SOCIAL HISTORY:  Social History     Socioeconomic History     Marital status:      Spouse name: Not on file     Number of children: Not on file     Years of education: Not on file     Highest education level: Not on file   Occupational History     Not on file   Social Needs     Financial resource strain: Not on file     Food insecurity:     Worry: Not on file     Inability: Not on file     Transportation needs:     Medical: Not on file     Non-medical: Not on file   Tobacco Use     Smoking status: Former Smoker     Last attempt to quit: 1970     Years since quittin.5     Smokeless tobacco: Never Used   Substance and Sexual Activity     Alcohol use: No     Drug use: No     Sexual activity: Never   Lifestyle     Physical activity:     Days per week: Not on file     Minutes per session: Not on file     Stress: Not on file   Relationships     Social connections:     Talks on phone: Not on file     Gets together: Not on file     Attends L[employer] service: Not on file     Active member of club or organization: Not on file     Attends meetings of clubs or organizations: Not on file     Relationship status: Not on file     Intimate partner violence:     Fear of current or ex partner: Not on file     Emotionally abused: Not on file     Physically abused: Not on file     Forced sexual activity: Not on file   Other Topics Concern     Parent/sibling w/ CABG, MI or angioplasty before 65F 55M? No   Social History Narrative     Not on file       FAMILY HISTORY: (I personally reviewed this history with the patient at today's visit)  Family  "History   Problem Relation Age of Onset     Cancer Father      Glaucoma Father      Cancer Mother      Hypertension Mother      Hypertension Sister      Cerebrovascular Disease Sister      Thyroid Disease No family hx of      Macular Degeneration No family hx of           ROS:    No fevers or chills  No weight loss  No sore throat  No lymphadenopathy  No headache, paraesthesias, or weakness in a limb  No shortness of breath or wheezing  No chest pain or pressure  No arthralgias or myalgias  No rashes or skin changes  No odynophagia or dysphagia  No BRBPR, hematochezia, melena  No dysuria, frequency or urgency  No hot/cold intolerance or polyria  No anxiety or depression  PHYSICAL EXAMINATION:  Constitutional: aaox3, cooperative, pleasant, not dyspneic/diaphoretic, no acute distress  Vitals reviewed: /75 (BP Location: Left arm, Patient Position: Sitting, Cuff Size: Adult Regular)   Pulse 70   Ht 1.537 m (5' 0.5\")   Wt 49.4 kg (108 lb 14.4 oz)   LMP 05/10/1978 (Approximate)   SpO2 100%   BMI 20.92 kg/m     Wt:   Wt Readings from Last 2 Encounters:   07/02/19 49.4 kg (108 lb 14.4 oz)   06/27/19 49.6 kg (109 lb 6.4 oz)          Eyes: Sclera anicteric/injected  Ears/nose/mouth/throat: Normal oropharynx without ulcers or exudate, mucus membranes moist, hearing intact  Neck: supple, thyroid normal size  CV: No edema, RRR  Respiratory: Unlabored breathing, CTAB  Lymph: No submandibular, supraclavicular or inguinal lymphadenopathy  Abd: Nondistended, no masses, +bs, no hepatosplenomegaly, nontender, no peritoneal signs  Skin: warm, perfused, no jaundice  Psych: Normal affect  MSK: Normal gait      PERTINENT STUDIES: (I personally reviewed these laboratory studies today)  Most recent CBC:   Recent Labs   Lab Test 10/25/18  1118 10/03/17  0728   WBC 5.3 3.5*   HGB 13.2 13.5   HCT 40.2 40.4    181     Most recent hepatic panel:  Recent Labs   Lab Test 03/07/16  1555 08/15/13  0859   ALT 33 32   AST 33 41 "     Most recent creatinine:  Recent Labs   Lab Test 10/25/18  1118 10/03/17  0728   CR 0.71 0.78         ASSESSMENT/PLAN:    Diann John is a 87 year old female who presents in our clinic today to discuss her positive Cologuard test.  She recently had this testing done last month after her sister passed away from colon cancer in March 2019.  Her last her last colonoscopy was in August 2010 which revealed a 3 mm sessile serrated adenomatous polyp in the cecum, a tortuous sigmoid colon with small mouthed diverticula. She recalls being told that she did not need a repeat colonoscopy however after learning of her sisters diagnosis she became concerned and requested testing. She now has a positive cologaurd test and is interested in pursuing a diagnostic colonoscopy which is typically the next step with a positive cologuard. We discussed colonoscopy screening which is not typically done at her age along with risks and complications including perforation, bleeding and even death. She verbalizes understanding of these risks and is interested in diagnostic colonoscopy. She states that she would be interested in seeking treatment if worse case scenario she is found to have a colon cancer.     Will review patients case with Dr. Morton for his opinion to see if it would be reasonable for patient to pursue colonoscopy. Orders will not be placed at this time until further discussion.     After further review with Dr. Morton, we will pursue diagnostic colonoscopy given positive cologuard and family hx of colon cancer.           Positive colorectal cancer screening using Cologuard test  Family history of colon cancer    Thank you for this consultation.  It was a pleasure to participate in the care of this patient; please contact us with any further questions.      This note was created with voice recognition software, and while reviewed for accuracy, typos may remain.     Kane Muhammad PA-C  Gastroenterology  [facility]  [facility]

## 2019-07-02 NOTE — NURSING NOTE
"Diann John's goals for this visit include:   Chief Complaint   Patient presents with     Consult     Positive Cologuard Test       She requests these members of her care team be copied on today's visit information: PCP    PCP: Esmer Finley    Referring Provider:  No referring provider defined for this encounter.    /75 (BP Location: Left arm, Patient Position: Sitting, Cuff Size: Adult Regular)   Pulse 70   Ht 1.537 m (5' 0.5\")   Wt 49.4 kg (108 lb 14.4 oz)   LMP 05/10/1978 (Approximate)   SpO2 100%   BMI 20.92 kg/m      Do you need any medication refills at today's visit? No    Darby Doran LPN      "

## 2019-07-02 NOTE — Clinical Note
87 year old with positive cologuard requesting colonoscopy. She is pretty adamant about having one and understands the risks/concerns at her age. Wondering if it would be reasonable to pursue colonoscopy at the [facility] after pre op clearance by her PCP?

## 2019-08-05 ENCOUNTER — ANCILLARY PROCEDURE (OUTPATIENT)
Dept: CT IMAGING | Facility: CLINIC | Age: 84
End: 2019-08-05
Attending: INTERNAL MEDICINE
Payer: COMMERCIAL

## 2019-08-05 ENCOUNTER — OFFICE VISIT (OUTPATIENT)
Dept: FAMILY MEDICINE | Facility: CLINIC | Age: 84
End: 2019-08-05
Payer: COMMERCIAL

## 2019-08-05 VITALS
BODY MASS INDEX: 20.3 KG/M2 | HEIGHT: 61 IN | WEIGHT: 107.5 LBS | SYSTOLIC BLOOD PRESSURE: 136 MMHG | HEART RATE: 77 BPM | OXYGEN SATURATION: 98 % | TEMPERATURE: 97 F | DIASTOLIC BLOOD PRESSURE: 70 MMHG | RESPIRATION RATE: 20 BRPM

## 2019-08-05 DIAGNOSIS — Z12.11 SPECIAL SCREENING FOR MALIGNANT NEOPLASMS, COLON: ICD-10-CM

## 2019-08-05 DIAGNOSIS — J44.9 CHRONIC OBSTRUCTIVE PULMONARY DISEASE, UNSPECIFIED COPD TYPE (H): ICD-10-CM

## 2019-08-05 DIAGNOSIS — Z01.818 PREOP GENERAL PHYSICAL EXAM: Primary | ICD-10-CM

## 2019-08-05 DIAGNOSIS — I10 HYPERTENSION GOAL BP (BLOOD PRESSURE) < 140/90: ICD-10-CM

## 2019-08-05 PROCEDURE — 99214 OFFICE O/P EST MOD 30 MIN: CPT | Performed by: PHYSICIAN ASSISTANT

## 2019-08-05 PROCEDURE — 71250 CT THORAX DX C-: CPT | Performed by: RADIOLOGY

## 2019-08-05 PROCEDURE — 93000 ELECTROCARDIOGRAM COMPLETE: CPT | Performed by: PHYSICIAN ASSISTANT

## 2019-08-05 ASSESSMENT — PAIN SCALES - GENERAL: PAINLEVEL: NO PAIN (0)

## 2019-08-05 ASSESSMENT — MIFFLIN-ST. JEOR: SCORE: 847.06

## 2019-08-05 NOTE — PROGRESS NOTES
[address] [address]  [phone]  Dept: [phone]    PRE-OP EVALUATION:  Today's date: 2019    Diann John (: 1931) presents for pre-operative evaluation assessment as requested by Dr. Morton .  She requires evaluation and anesthesia risk assessment prior to undergoing surgery/procedure for treatment of  + cologuard    Proposed Surgery/ Procedure: Colonoscopy   Date of Surgery/ Procedure:   Time of Surgery/ Procedure: 9am   Hospital/Surgical Facility:  [facility]     Primary Physician: Esmer Finley  Type of Anesthesia Anticipated: General    Patient has a Health Care Directive or Living Will:  YES    1. NO - Do you have a history of heart attack, stroke, stent, bypass or surgery on an artery in the head, neck, heart or legs?  2. NO - Do you ever have any pain or discomfort in your chest?  3. NO - Do you have a history of  Heart Failure?  4. NO - Are you troubled by shortness of breath when: walking on the level, up a slight hill or at night?  5. NO - Do you currently have a cold, bronchitis or other respiratory infection?  6. YES - DO YOU HAVE A COUGH, SHORTNESS OF BREATH OR WHEEZING? congestion  From allergies basically at baseline  6. NO - Do you have a cough, shortness of breath or wheezing?  7. NO - Do you sometimes get pains in the calves of your legs when you walk?  8. NO - Do you or anyone in your family have previous history of blood clots?  9. NO - Do you or does anyone in your family have a serious bleeding problem such as prolonged bleeding following surgeries or cuts?  10. NO - Have you ever had problems with anemia or been told to take iron pills?  11. NO - Have you had any abnormal blood loss such as black, tarry or bloody stools, or abnormal vaginal bleeding?  12. NO - Have you ever had a blood transfusion?  13. NO - Have you or any of your relatives ever had problems with anesthesia?  14. NO - Do  "you have sleep apnea, excessive snoring or daytime drowsiness?  15. NO - Do you have any prosthetic heart valves?  16. NO - Do you have prosthetic joints?  17. NO - Is there any chance that you may be pregnant?      HPI:     HPI related to upcoming procedure: Colonoscopy . Sister recently  from colon cancer, patient had cologuard test + recently and after discussion with GI (primarily given age) decision made to get colonoscopy. No overt symptoms.      Rports BPs \"always high in the office.\"  At home systoplic generally 130s, stress echo 2014 was wnl      See problem list for active medical problems.  Problems all longstanding and stable, except as noted/documented.  See ROS for pertinent symptoms related to these conditions.      MEDICAL HISTORY:     Patient Active Problem List    Diagnosis Date Noted     Family history of colon cancer 2019     Priority: Medium     Sister  of colon cancer in her 90's       Pulmonary emphysema, unspecified emphysema type (H) 10/25/2018     Priority: Medium     Pulmonary nodules 2017     Priority: Medium     ASVD (arteriosclerotic vascular disease) 2017     Priority: Medium     Hyperlipidemia LDL goal <70 2017     Priority: Medium     Chronic rhinitis 2017     Priority: Medium     Advanced directives, counseling/discussion 09/10/2015     Priority: Medium     Advance Care Planning 9/10/2015: ACP Review and Resources Provided:  Reviewed chart for advance care plan.  Diann John has no plan or code status on file. Discussed available resources and provided with information on 2015. Confirmed code status reflects current choices pending further ACP discussions.  Confirmed/documented legally designated decision maker(s). Added by Alethea Keene         PVD (posterior vitreous detachment) OU 06/10/2014     Priority: Medium     Carotid artery stenosis, asymptomatic 2014     Priority: Medium     Hypertension goal BP (blood pressure) < " 140/90 08/15/2013     Priority: Medium     Seasonal allergies 08/15/2013     Priority: Medium     Macular pucker, right eye 11/18/2010     Priority: Medium     Pseudophakia OU 11/18/2010     Priority: Medium     Blepharitis 11/18/2010     Priority: Medium      Past Medical History:   Diagnosis Date     Hypertension      Nonsenile cataract      Postherpetic neuralgia      Shingles     3 x      Steroid responders 5/28/2014     Past Surgical History:   Procedure Laterality Date     APPENDECTOMY       C RAD RESEC TONSIL/PILLARS      Adeniods     CATARACT IOL, RT/LT Right      HAND SURGERY       NOSE SURGERY       Current Outpatient Medications   Medication Sig Dispense Refill     albuterol (PROAIR HFA/PROVENTIL HFA/VENTOLIN HFA) 108 (90 Base) MCG/ACT inhaler Inhale 2 puffs into the lungs every 4 hours as needed for shortness of breath / dyspnea or wheezing 1 Inhaler 3     amLODIPine (NORVASC) 2.5 MG tablet TAKE ONE TABLET BY MOUTH ONCE DAILY 90 tablet 3     ascorbic acid (VITAMIN C) 1000 MG TABS Take 1,000 mg by mouth daily       aspirin 81 MG tablet Take 1 tablet (81 mg) by mouth daily 30 tablet      atorvastatin (LIPITOR) 20 MG tablet TAKE 1 TABLET BY MOUTH ONCE DAILY 90 tablet 2     Calcium-Vitamin D (CALCIUM + D PO) Take 1 tablet by mouth daily.       fluticasone (FLONASE) 50 MCG/ACT spray Spray 1-2 sprays into both nostrils daily 1 Bottle 11     omega-3 fatty acids (FISH OIL) 1200 MG capsule Take 1 capsule by mouth daily       ipratropium (ATROVENT) 0.06 % spray Spray 1 spray into both nostrils 4 times daily as needed for rhinitis (Patient not taking: Reported on 6/27/2019) 1 Box 3     order for DME Equipment being ordered: BP cuff/machine (Patient not taking: Reported on 7/2/2019) 1 Device 0     tiotropium (SPIRIVA) 18 MCG inhaled capsule Inhale 1 capsule (18 mcg) into the lungs daily (Patient not taking: Reported on 6/27/2019) 90 capsule 3     umeclidinium (INCRUSE ELLIPTA) 62.5 MCG/INH inhaler Inhale 1 puff into  "the lungs daily (Patient not taking: Reported on 2019) 1 Inhaler 3     OTC products: None, except as noted above    Allergies   Allergen Reactions     Codeine Other (See Comments)     Ottoniel     Lisinopril Cough      Latex Allergy: NO    Social History     Tobacco Use     Smoking status: Former Smoker     Last attempt to quit: 1970     Years since quittin.6     Smokeless tobacco: Never Used   Substance Use Topics     Alcohol use: No     History   Drug Use No       REVIEW OF SYSTEMS:   CONSTITUTIONAL: NEGATIVE for fever, chills, change in weight  INTEGUMENTARY/SKIN: NEGATIVE for worrisome rashes, moles or lesions  EYES: NEGATIVE for vision changes or irritation  RESP:h xmild COPD*  CV: NEGATIVE for chest pain, palpitations or peripheral edema  GI: cologuard as above  : NEGATIVE for frequency, dysuria, or hematuria  MUSCULOSKELETAL: NEGATIVE for significant arthralgias or myalgia  NEURO: NEGATIVE for weakness, dizziness or paresthesias  ENDOCRINE: NEGATIVE for temperature intolerance, skin/hair changes  HEME: NEGATIVE for bleeding problems  PSYCHIATRIC: NEGATIVE for changes in mood or affect    EXAM:   /70   Pulse 77   Temp 97  F (36.1  C) (Oral)   Resp 20   Ht 1.537 m (5' 0.5\")   Wt 48.8 kg (107 lb 8 oz)   LMP 05/10/1978 (Approximate)   SpO2 98%   Breastfeeding? No   BMI 20.65 kg/m      GENERAL APPEARANCE: healthy, alert and no distress     EYES: EOMI, PERRL     HENT: ear canals and TM's normal and nose and mouth without ulcers or lesions     NECK: no adenopathy, no asymmetry, masses, or scars and thyroid normal to palpation     RESP: lungs clear to auscultation - no rales, rhonchi or wheezes     CV: regular rates and rhythm, normal S1 S2, no S3 or S4 and no murmur, click or rub     ABDOMEN:  soft, nontender, no HSM or masses and bowel sounds normal     MS: extremities normal- no gross deformities noted, no evidence of inflammation in joints, FROM in all extremities.     SKIN: no " suspicious lesions or rashes     NEURO: Normal strength and tone, sensory exam grossly normal, mentation intact and speech normal     PSYCH: mentation appears normal. and affect normal/bright     LYMPHATICS: No cervical adenopathy    DIAGNOSTICS:   EKG: appears normal, NSR, improved from 2014 study    Recent Labs   Lab Test 10/25/18  1118 10/03/17  0728   HGB 13.2 13.5    181    137   POTASSIUM 3.9 4.0   CR 0.71 0.78        IMPRESSION:   Reason for surgery/procedure:She requires evaluation and anesthesia risk assessment prior to undergoing surgery/procedure for treatment of  + cologuard    Proposed Surgery/ Procedure: Colonoscopy     The proposed surgical procedure is considered LOW risk.    REVISED CARDIAC RISK INDEX  The patient has the following serious cardiovascular risks for perioperative complications such as (MI, PE, VFib and 3  AV Block):  No serious cardiac risks  INTERPRETATION: 0 risks: Class I (very low risk - 0.4% complication rate)    The patient has the following additional risks for perioperative complications:  No identified additional risks      ICD-10-CM    1. Preop general physical exam Z01.818 EKG 12-lead complete w/read - Clinics   2. Special screening for malignant neoplasms, colon Z12.11    3. Hypertension goal BP (blood pressure) < 140/90 I10        RECOMMENDATIONS:          --Patient is to take all scheduled medications on the day of surgery EXCEPT for modifications listed below.    Anticoagulant or Antiplatelet Medication Use  ASPIRIN: Discontinue ASA 7-10 days prior to procedure to reduce bleeding risk.  It should be resumed post-operatively.        APPROVAL GIVEN to proceed with proposed procedure, without further diagnostic evaluation       Signed Electronically by: LEIF Gonsalez    Copy of this evaluation report is provided to requesting physician.    [place] Preop Guidelines    Revised Cardiac Risk Index

## 2019-08-05 NOTE — PROGRESS NOTES
Chart reviewed. Agree with assessment and plan. Approved to proceed with procedure and anesthesia without further work up or referral.  Esmer Finley MD

## 2019-08-06 ENCOUNTER — ANESTHESIA EVENT (OUTPATIENT)
Dept: SURGERY | Facility: AMBULATORY SURGERY CENTER | Age: 84
End: 2019-08-06

## 2019-08-07 ENCOUNTER — HOSPITAL ENCOUNTER (OUTPATIENT)
Facility: AMBULATORY SURGERY CENTER | Age: 84
Discharge: HOME | End: 2019-08-07
Attending: INTERNAL MEDICINE | Admitting: INTERNAL MEDICINE
Payer: COMMERCIAL

## 2019-08-07 ENCOUNTER — ANESTHESIA (OUTPATIENT)
Dept: SURGERY | Facility: AMBULATORY SURGERY CENTER | Age: 84
End: 2019-08-07
Payer: COMMERCIAL

## 2019-08-07 VITALS
OXYGEN SATURATION: 99 % | SYSTOLIC BLOOD PRESSURE: 159 MMHG | HEART RATE: 69 BPM | RESPIRATION RATE: 16 BRPM | TEMPERATURE: 97.5 F | DIASTOLIC BLOOD PRESSURE: 83 MMHG

## 2019-08-07 VITALS — HEART RATE: 53 BPM

## 2019-08-07 LAB — COLONOSCOPY: NORMAL

## 2019-08-07 PROCEDURE — G8918 PT W/O PREOP ORDER IV AB PRO: HCPCS

## 2019-08-07 PROCEDURE — 45381 COLONOSCOPY SUBMUCOUS NJX: CPT

## 2019-08-07 PROCEDURE — 45385 COLONOSCOPY W/LESION REMOVAL: CPT

## 2019-08-07 PROCEDURE — G8907 PT DOC NO EVENTS ON DISCHARG: HCPCS

## 2019-08-07 RX ORDER — PROPOFOL 10 MG/ML
INJECTION, EMULSION INTRAVENOUS PRN
Status: DISCONTINUED | OUTPATIENT
Start: 2019-08-07 | End: 2019-08-07

## 2019-08-07 RX ORDER — LIDOCAINE 40 MG/G
CREAM TOPICAL
Status: DISCONTINUED | OUTPATIENT
Start: 2019-08-07 | End: 2019-08-08 | Stop reason: HOSPADM

## 2019-08-07 RX ORDER — MEPERIDINE HYDROCHLORIDE 25 MG/ML
12.5 INJECTION INTRAMUSCULAR; INTRAVENOUS; SUBCUTANEOUS
Status: DISCONTINUED | OUTPATIENT
Start: 2019-08-07 | End: 2019-08-08 | Stop reason: HOSPADM

## 2019-08-07 RX ORDER — PROPOFOL 10 MG/ML
INJECTION, EMULSION INTRAVENOUS CONTINUOUS PRN
Status: DISCONTINUED | OUTPATIENT
Start: 2019-08-07 | End: 2019-08-07

## 2019-08-07 RX ORDER — LIDOCAINE HYDROCHLORIDE 20 MG/ML
INJECTION, SOLUTION INFILTRATION; PERINEURAL PRN
Status: DISCONTINUED | OUTPATIENT
Start: 2019-08-07 | End: 2019-08-07

## 2019-08-07 RX ORDER — SODIUM CHLORIDE, SODIUM LACTATE, POTASSIUM CHLORIDE, CALCIUM CHLORIDE 600; 310; 30; 20 MG/100ML; MG/100ML; MG/100ML; MG/100ML
INJECTION, SOLUTION INTRAVENOUS CONTINUOUS
Status: DISCONTINUED | OUTPATIENT
Start: 2019-08-07 | End: 2019-08-08 | Stop reason: HOSPADM

## 2019-08-07 RX ORDER — ONDANSETRON 2 MG/ML
4 INJECTION INTRAMUSCULAR; INTRAVENOUS
Status: DISCONTINUED | OUTPATIENT
Start: 2019-08-07 | End: 2019-08-08 | Stop reason: HOSPADM

## 2019-08-07 RX ORDER — OXYCODONE HYDROCHLORIDE 5 MG/1
5 TABLET ORAL EVERY 4 HOURS PRN
Status: DISCONTINUED | OUTPATIENT
Start: 2019-08-07 | End: 2019-08-08 | Stop reason: HOSPADM

## 2019-08-07 RX ORDER — FENTANYL CITRATE 50 UG/ML
25-50 INJECTION, SOLUTION INTRAMUSCULAR; INTRAVENOUS
Status: DISCONTINUED | OUTPATIENT
Start: 2019-08-07 | End: 2019-08-08 | Stop reason: HOSPADM

## 2019-08-07 RX ORDER — NALOXONE HYDROCHLORIDE 0.4 MG/ML
.1-.4 INJECTION, SOLUTION INTRAMUSCULAR; INTRAVENOUS; SUBCUTANEOUS
Status: DISCONTINUED | OUTPATIENT
Start: 2019-08-07 | End: 2019-08-08 | Stop reason: HOSPADM

## 2019-08-07 RX ORDER — ONDANSETRON 2 MG/ML
4 INJECTION INTRAMUSCULAR; INTRAVENOUS EVERY 30 MIN PRN
Status: DISCONTINUED | OUTPATIENT
Start: 2019-08-07 | End: 2019-08-08 | Stop reason: HOSPADM

## 2019-08-07 RX ORDER — HYDROMORPHONE HYDROCHLORIDE 1 MG/ML
.3-.5 INJECTION, SOLUTION INTRAMUSCULAR; INTRAVENOUS; SUBCUTANEOUS EVERY 10 MIN PRN
Status: DISCONTINUED | OUTPATIENT
Start: 2019-08-07 | End: 2019-08-08 | Stop reason: HOSPADM

## 2019-08-07 RX ORDER — ONDANSETRON 4 MG/1
4 TABLET, ORALLY DISINTEGRATING ORAL EVERY 30 MIN PRN
Status: DISCONTINUED | OUTPATIENT
Start: 2019-08-07 | End: 2019-08-08 | Stop reason: HOSPADM

## 2019-08-07 RX ADMIN — LIDOCAINE HYDROCHLORIDE 60 MG: 20 INJECTION, SOLUTION INFILTRATION; PERINEURAL at 09:21

## 2019-08-07 RX ADMIN — SODIUM CHLORIDE, SODIUM LACTATE, POTASSIUM CHLORIDE, CALCIUM CHLORIDE: 600; 310; 30; 20 INJECTION, SOLUTION INTRAVENOUS at 08:09

## 2019-08-07 RX ADMIN — PROPOFOL 50 MG: 10 INJECTION, EMULSION INTRAVENOUS at 09:21

## 2019-08-07 RX ADMIN — PROPOFOL 150 MCG/KG/MIN: 10 INJECTION, EMULSION INTRAVENOUS at 09:22

## 2019-08-07 ASSESSMENT — COPD QUESTIONNAIRES: COPD: 1

## 2019-08-07 NOTE — ANESTHESIA PREPROCEDURE EVALUATION
Anesthesia Pre-Procedure Evaluation    Patient: Diann John   MRN:     [id number] Gender:   female   Age:    88 year old :      1931        Preoperative Diagnosis: COLONOSCOPY/[facility]  Positive colorectal cancer screening using Cologuard test [R19.5]  - Primary   Family history of colon cancer   Procedure(s):  COLONOSCOPY, WITH CO2 INSUFFLATION     Past Medical History:   Diagnosis Date     Hypertension      Nonsenile cataract      Postherpetic neuralgia      Shingles     3 x      Steroid responders 2014      Past Surgical History:   Procedure Laterality Date     APPENDECTOMY       C RAD RESEC TONSIL/PILLARS      Adeniods     CATARACT IOL, RT/LT Right      HAND SURGERY       NOSE SURGERY            Anesthesia Evaluation     .             ROS/MED HX    ENT/Pulmonary:  - neg pulmonary ROS   (+)COPD, , . .    Neurologic:  - neg neurologic ROS     Cardiovascular: Comment: Carotid stenosis: 60-70% LCA - neg cardiovascular ROS   (+) Dyslipidemia, hypertension--CAD, --. : . . . :. .       METS/Exercise Tolerance:     Hematologic:  - neg hematologic  ROS       Musculoskeletal:  - neg musculoskeletal ROS       GI/Hepatic:  - neg GI/hepatic ROS       Renal/Genitourinary:  - ROS Renal section negative       Endo:  - neg endo ROS       Psychiatric:  - neg psychiatric ROS       Infectious Disease:  - neg infectious disease ROS       Malignancy:      - no malignancy   Other:    - neg other ROS                     PHYSICAL EXAM:   Mental Status/Neuro: A/A/O   Airway: Facies: Feasible  Mallampati: II  Mouth/Opening: Full  TM distance: > 6 cm  Neck ROM: Full   Respiratory: Auscultation: CTAB     Resp. Rate: Normal     Resp. Effort: Normal      CV: Rhythm: Regular  Rate: Age appropriate  Heart: Normal Sounds  Edema: None   Comments:      Dental: Normal Dentition                LABS:  CBC:   Lab Results   Component Value Date    WBC 5.3 10/25/2018    WBC 3.5 (L) 10/03/2017    HGB 13.2 10/25/2018     "HGB 13.5 10/03/2017    HCT 40.2 10/25/2018    HCT 40.4 10/03/2017     10/25/2018     10/03/2017     BMP:   Lab Results   Component Value Date     10/25/2018     10/03/2017    POTASSIUM 3.9 10/25/2018    POTASSIUM 4.0 10/03/2017    CHLORIDE 107 10/25/2018    CHLORIDE 102 10/03/2017    CO2 27 10/25/2018    CO2 31 10/03/2017    BUN 16 10/25/2018    BUN 18 10/03/2017    CR 0.71 10/25/2018    CR 0.78 10/03/2017    GLC 87 10/25/2018    GLC 80 10/03/2017     COAGS: No results found for: PTT, INR, FIBR  POC: No results found for: BGM, HCG, HCGS  OTHER:   Lab Results   Component Value Date    REBECCA 8.8 10/25/2018    PHOS 3.5 10/03/2017    ALBUMIN 3.7 10/03/2017    PROTTOTAL 7.0 03/07/2016    ALT 33 03/07/2016    AST 33 03/07/2016    ALKPHOS 70 03/07/2016    BILITOTAL 0.3 03/07/2016    TSH 3.53 08/15/2013        Preop Vitals    BP Readings from Last 3 Encounters:   08/07/19 (!) 172/74   08/05/19 136/70   07/02/19 137/75    Pulse Readings from Last 3 Encounters:   08/07/19 71   08/05/19 77   07/02/19 70      Resp Readings from Last 3 Encounters:   08/07/19 20   08/05/19 20   06/27/19 24    SpO2 Readings from Last 3 Encounters:   08/07/19 99%   08/05/19 98%   07/02/19 100%      Temp Readings from Last 1 Encounters:   08/07/19 37.1  C (98.8  F) (Temporal)    Ht Readings from Last 1 Encounters:   08/05/19 1.537 m (5' 0.5\")      Wt Readings from Last 1 Encounters:   08/05/19 48.8 kg (107 lb 8 oz)    Estimated body mass index is 20.65 kg/m  as calculated from the following:    Height as of 8/5/19: 1.537 m (5' 0.5\").    Weight as of 8/5/19: 48.8 kg (107 lb 8 oz).     LDA:  Peripheral IV 08/07/19 Right Hand (Active)   Number of days: 0        Assessment:   ASA SCORE: 3    H&P: History and physical reviewed and following examination; no interval change.   Smoking Status:  Non-Smoker/Unknown   NPO Status: NPO Appropriate     Plan:   Anes. Type:  MAC   Pre-Medication: None   Induction:  N/a   Airway: Native " Airway   Access/Monitoring: PIV   Maintenance: TIVA     Postop Plan:   Postop Pain: None  Postop Sedation/Airway: Not planned     PONV Management:   Adult Risk Factors: Female, Non-Smoker   Prevention: Ondansetron, No Volatiles     CONSENT: Direct conversation   Plan and risks discussed with: Patient   Blood Products: Consent Deferred (Minimal Blood Loss)                   Tyron Selby MD

## 2019-08-07 NOTE — ANESTHESIA POSTPROCEDURE EVALUATION
Anesthesia POST Procedure Evaluation    Patient: Diann John   MRN:     [id number] Gender:   female   Age:    88 year old :      1931        Preoperative Diagnosis: COLONOSCOPY/MAC  [facility]  Positive colorectal cancer screening using Cologuard test [R19.5]  - Primary   Family history of colon cancer   Procedure(s):  COLONOSCOPY, WITH CO2 INSUFFLATION  Colonoscopy, Flexible, With Lesion Removal Using Snare  Tattooing, With Colonoscopy  Colonoscopy, With Hemorrhage Control  Colonoscopy, With Foreign Body Removal   Postop Comments: No value filed.       Anesthesia Type:  Not documented  MAC    Reportable Event: NO     PAIN: Uncomplicated   Sign Out status: Comfortable, Well controlled pain     PONV: No PONV   Sign Out status:  No Nausea or Vomiting     Neuro/Psych: Uneventful perioperative course   Sign Out Status: Preoperative baseline; Age appropriate mentation     Airway/Resp.: Uneventful perioperative course   Sign Out Status: Non labored breathing, age appropriate RR; Resp. Status within EXPECTED Parameters     CV: Uneventful perioperative course   Sign Out status: Appropriate BP and perfusion indices; Appropriate HR/Rhythm     Disposition:   Sign Out in:  PACU  Disposition:  Phase II; Home  Recovery Course: Uneventful  Follow-Up: Not required           Last Anesthesia Record Vitals:  CRNA VITALS  2019 1006 - 2019 1106      2019             Pulse:  55    SpO2:  100 %          Last PACU Vitals:  Vitals Value Taken Time   /86 2019 10:36 AM   Temp     Pulse 69 2019 10:36 AM   Resp 16 2019 10:36 AM   SpO2 99 % 2019 10:36 AM   Temp src     NIBP 150/67 2019 10:30 AM   Pulse 55 2019 10:34 AM   SpO2 100 % 2019 10:34 AM   Resp     Temp     Ht Rate 54 2019 10:31 AM   Temp 2           Electronically Signed By: Tyron Selby MD, 2019, 2:27 PM

## 2019-08-07 NOTE — ANESTHESIA CARE TRANSFER NOTE
Patient: Diann John    Procedure(s):  COLONOSCOPY, WITH CO2 INSUFFLATION  Colonoscopy, Flexible, With Lesion Removal Using Snare  Tattooing, With Colonoscopy  Colonoscopy, With Hemorrhage Control  Colonoscopy, With Foreign Body Removal    Diagnosis: COLONOSCOPY/MAC  [facility]  Positive colorectal cancer screening using Cologuard test [R19.5]  - Primary   Family history of colon cancer  Diagnosis Additional Information: No value filed.    Anesthesia Type:   MAC     Note:  Airway :Room Air  Patient transferred to:Phase II  Comments: To Phase II. Report to RN.  VSS Resp status stable.Handoff Report: Identifed the Patient, Identified the Reponsible Provider, Reviewed the pertinent medical history, Discussed the surgical course, Reviewed Intra-OP anesthesia mangement and issues during anesthesia, Set expectations for post-procedure period and Allowed opportunity for questions and acknowledgement of understanding      Vitals: (Last set prior to Anesthesia Care Transfer)    CRNA VITALS  8/7/2019 1006 - 8/7/2019 1037      8/7/2019             Pulse:  55    SpO2:  100 %                Electronically Signed By: YOBANY Francois CRNA  August 7, 2019  10:37 AM

## 2019-08-08 DIAGNOSIS — I10 ESSENTIAL HYPERTENSION WITH GOAL BLOOD PRESSURE LESS THAN 140/90: ICD-10-CM

## 2019-08-09 ENCOUNTER — TELEPHONE (OUTPATIENT)
Dept: FAMILY MEDICINE | Facility: CLINIC | Age: 84
End: 2019-08-09

## 2019-08-09 RX ORDER — AMLODIPINE BESYLATE 2.5 MG/1
TABLET ORAL
Qty: 90 TABLET | Refills: 3 | Status: SHIPPED | OUTPATIENT
Start: 2019-08-09 | End: 2020-08-12

## 2019-08-09 NOTE — TELEPHONE ENCOUNTER
Please tell patient to call Gastro as next step referral placed. Per instructions on letter that was sent to patient.        Dianna Strong RN

## 2019-08-09 NOTE — TELEPHONE ENCOUNTER
Reason for Call:  Other appointment    Detailed comments: Pt calling for she received a letter in the mail after she completed her colonoscopy and would like a call back for advisement on what to do next.    Phone Number Patient can be reached at: Home number on file [phone] (home)    Best Time: anytime    Can we leave a detailed message on this number? YES    Call taken on 8/9/2019 at 3:02 PM by Barrera Contreras

## 2019-08-09 NOTE — TELEPHONE ENCOUNTER
"Requested Prescriptions   Pending Prescriptions Disp Refills     amLODIPine (NORVASC) 2.5 MG tablet [Pharmacy Med Name: AMLODIPINE 2.5MG TAB] 90 tablet 3     Sig: TAKE 1 TABLET BY MOUTH ONCE DAILY         Last Written Prescription Date:  8/16/18  Last Fill Quantity: 90,  # refills: 3   Last Office Visit with [facility], [facility] or [facility] prescribing provider:  8/5/19   Future Office Visit:    Next 5 appointments (look out 90 days)    Aug 12, 2019  8:00 AM CDT  Return Visit with Luis Gray MD  [facility] ([facility]) [id number] [place] [id number]  [phone]             Calcium Channel Blockers Protocol  Failed - 8/8/2019  3:36 PM        Failed - Blood pressure under 140/90 in past 12 months     BP Readings from Last 3 Encounters:   08/07/19 (!) 159/83   08/05/19 136/70   07/02/19 137/75                 Passed - Recent (12 mo) or future (30 days) visit within the authorizing provider's specialty     Patient had office visit in the last 12 months or has a visit in the next 30 days with authorizing provider or within the authorizing provider's specialty.  See \"Patient Info\" tab in inbasket, or \"Choose Columns\" in Meds & Orders section of the refill encounter.              Passed - Medication is active on med list        Passed - Patient is age 18 or older        Passed - No active pregnancy on record        Passed - Normal serum creatinine on file in past 12 months     Recent Labs   Lab Test 10/25/18  1118  09/11/17  1522 08/23/17   CR 0.71   < >  --   --    CREAT  --   --  0.8  --    CRPOC  --   --   --  0.9    < > = values in this interval not displayed.             Passed - No positive pregnancy test in past 12 months              Haresh Faarax  [facility]  "

## 2019-08-09 NOTE — TELEPHONE ENCOUNTER
Prescription approved per [facility] Refill Protocol.      Gabriel Thompson RN, BSN, PHN

## 2019-08-12 ENCOUNTER — OFFICE VISIT (OUTPATIENT)
Dept: PULMONOLOGY | Facility: CLINIC | Age: 84
End: 2019-08-12
Payer: COMMERCIAL

## 2019-08-12 VITALS
RESPIRATION RATE: 30 BRPM | BODY MASS INDEX: 20.63 KG/M2 | SYSTOLIC BLOOD PRESSURE: 153 MMHG | DIASTOLIC BLOOD PRESSURE: 77 MMHG | WEIGHT: 107.4 LBS | HEART RATE: 71 BPM | OXYGEN SATURATION: 98 %

## 2019-08-12 DIAGNOSIS — R91.8 PULMONARY NODULES: Primary | ICD-10-CM

## 2019-08-12 LAB — COPATH REPORT: NORMAL

## 2019-08-12 PROCEDURE — 99214 OFFICE O/P EST MOD 30 MIN: CPT | Performed by: INTERNAL MEDICINE

## 2019-08-12 ASSESSMENT — PAIN SCALES - GENERAL: PAINLEVEL: NO PAIN (0)

## 2019-08-12 NOTE — PROGRESS NOTES
LUNG NODULE & INTERVENTIONAL PULMONARY CLINIC  [facility]     Diann John MRN# [id number]   Age: 88 year old YOB: 1931     Reason for Consultation: lung nodule(s)    Requesting Physician: No referring provider defined for this encounter.       Assessment and Plan:    1. Established multiple pulmonary lung nodule(s). Given the characteristics on current/previous imaging and risk factors; I would classify this to be low risk for cancer. Last CT was in 2018 and today there is no interval change. Given stability since 2017, surveillance has been completed     2. COPD. Mild in severity. Up-to-date on flu and PNA vaccine. No recent AECOPD. Had significant improvement with incruse. Can f/u with PCP for ongoing COPD management.      Billing: I spent more than 30 minutes face to face and greater than 50% of time was for counseling and coordination of care about the issues above.       Luis Gray MD   of Medicine  Interventional Pulmonology  Department of Pulmonary, Allergy, Critical Care and Sleep Medicine   [facility]  Pager: [phone]          History:     Diann John is a 87 year old female with sig h/o for HTN, COPD, hyperlipidemia, and allergies who is here for evaluation/followup of nodules     - No new resp sx or complaints. Denies dyspnea or cough today.   - Had CT angio head/neck and found incidental nodule (8mm in RUL). Had short term f/u in 2017 which showed resolution of the RUL nodule. There are 7mm and sub-6mm nodules that have been stable. She is here for f/u  - Personal hx of cancer: No. Normal mammograms and c-scope previously.   - Family hx of cancer: No.   of lung cancer.   - Exposure hx: Denies asbestos or radon exposure   - Tobacco hx: Past Smoker: 0.5ppd for 30years. Quit 36yrs ago.   - My interpretation of the images relevant for this visit  includes: previous RUL nodule is resolving. sub7mm bilateral nodules are stable. RLL anterior scarring in areas of emphysema    - My interpretation of the PFT's relevant for this visit includes: Obstructive pattern      Culprit Nodule(s):   1: Right upper lobe nodule and is 8 mm in size/severity and non-calcified in morphology/quality. First seen by chest CT on 2017. There is a decrease in size.     Other nodules:   Multiple bilateral lung nodule(s) and ground glass nodule that are sub 7 mm. First seen by chest CT on 2017. There is no interval change     Other active medical problems include:   - Has HTN and high cholesterol. Stable.  - Has COPD. Significant improvement with incruse. Does not use albuterol often. No previous hospitalizations for breathing. Up-to-date on vaccinations.             Past Medical History:      Past Medical History:   Diagnosis Date     Hypertension      Nonsenile cataract      Postherpetic neuralgia      Shingles     3 x      Steroid responders 2014           Past Surgical History:      Past Surgical History:   Procedure Laterality Date     APPENDECTOMY       C RAD RESEC TONSIL/PILLARS      Adeniods     CATARACT IOL, RT/LT Right      HAND SURGERY       NOSE SURGERY            Social History:     Social History     Tobacco Use     Smoking status: Former Smoker     Last attempt to quit: 1970     Years since quittin.6     Smokeless tobacco: Never Used   Substance Use Topics     Alcohol use: No          Family History:     Family History   Problem Relation Age of Onset     Cancer Father      Glaucoma Father      Cancer Mother      Hypertension Mother      Hypertension Sister      Cerebrovascular Disease Sister      Thyroid Disease No family hx of      Macular Degeneration No family hx of            Allergies:      Allergies   Allergen Reactions     Codeine Other (See Comments)     Heaaches     Lisinopril Cough          Medications:     Current Outpatient Medications    Medication Sig     albuterol (PROAIR HFA/PROVENTIL HFA/VENTOLIN HFA) 108 (90 Base) MCG/ACT inhaler Inhale 2 puffs into the lungs every 4 hours as needed for shortness of breath / dyspnea or wheezing     amLODIPine (NORVASC) 2.5 MG tablet TAKE 1 TABLET BY MOUTH ONCE DAILY     ascorbic acid (VITAMIN C) 1000 MG TABS Take 1,000 mg by mouth daily     aspirin 81 MG tablet Take 1 tablet (81 mg) by mouth daily     atorvastatin (LIPITOR) 20 MG tablet TAKE 1 TABLET BY MOUTH ONCE DAILY     Calcium-Vitamin D (CALCIUM + D PO) Take 1 tablet by mouth daily.     fluticasone (FLONASE) 50 MCG/ACT spray Spray 1-2 sprays into both nostrils daily     omega-3 fatty acids (FISH OIL) 1200 MG capsule Take 1 capsule by mouth daily     umeclidinium (INCRUSE ELLIPTA) 62.5 MCG/INH inhaler Inhale 1 puff into the lungs daily     ipratropium (ATROVENT) 0.06 % spray Spray 1 spray into both nostrils 4 times daily as needed for rhinitis (Patient not taking: Reported on 6/27/2019)     order for DME Equipment being ordered: BP cuff/machine (Patient not taking: Reported on 7/2/2019)     tiotropium (SPIRIVA) 18 MCG inhaled capsule Inhale 1 capsule (18 mcg) into the lungs daily (Patient not taking: Reported on 6/27/2019)     No current facility-administered medications for this visit.      Facility-Administered Medications Ordered in Other Visits   Medication     DOBUTamine 500 mg in dextrose 5% 250 mL (adult std)          Review of Systems:     CONSTITUTIONAL: negative for fever, chills, change in weight  INTEGUMENTARY/SKIN: no rash or obvious new lesions  ENT/MOUTH: no sore throat, new sinus pain or nasal drainage  RESP: see interval history  CV: negative for chest pain, palpitations or peripheral edema  GI: no nausea, vomiting, change in stools  : no dysuria  MUSCULOSKELETAL: no myalgias, arthralgias  ENDOCRINE: blood sugars with adequate control  PSYCHIATRIC: mood stable  LYMPHATIC: no new lymphadenopathy  HEME: no bleeding or easy  bruisability  NEURO: no numbness, weakness, headaches         Physical Exam:     Pulse:  [71] 71  Resp:  [30] 30  BP: (153)/(77) 153/77  SpO2:  [98 %] 98 %  Wt Readings from Last 4 Encounters:   08/12/19 48.7 kg (107 lb 6.4 oz)   08/05/19 48.8 kg (107 lb 8 oz)   07/02/19 49.4 kg (108 lb 14.4 oz)   06/27/19 49.6 kg (109 lb 6.4 oz)     Constitutional:   Awake, alert and in no apparent distress     Eyes:   Nonicteric, JAMAR     ENT:    Trachea is midline. No gross neck abnormalities      Neck:   Supple without supraclavicular or cervical lymphadenopathy     Lungs:   Good air flow.  No crackles. No rhonchi.  No wheezes.     Cardiovascular:   Normal S1 and S2.  RRR.  No murmur, gallop or rub.  Radial, DP and PT pulses normal and symmetric     Abdomen:   NABS, soft, nontender, nondistended.  No HSM.     Musculoskeletal:   No edema.      Neurologic:   Alert and conversant. Cranial nerves  intact.       Skin:   Warm, dry.  No rash on limited exam.           Current Laboratory Data:   All laboratory and imaging data reviewed.    No results found for this or any previous visit (from the past 24 hour(s)).         Previous Pulmonary Function Testing     FVC-Pred   Date Value Ref Range Status   08/03/2018 2.12 L      FVC-Pre   Date Value Ref Range Status   08/03/2018 3.06 L      FVC-%Pred-Pre   Date Value Ref Range Status   08/03/2018 144 %      FEV1-Pre   Date Value Ref Range Status   08/03/2018 1.91 L      FEV1-%Pred-Pre   Date Value Ref Range Status   08/03/2018 119 %      FEV1FVC-Pred   Date Value Ref Range Status   08/03/2018 72 %      FEV1FVC-Pre   Date Value Ref Range Status   08/03/2018 62 %      No results found for: 20029  FEFMax-Pred   Date Value Ref Range Status   08/03/2018 3.62 L/sec      FEFMax-Pre   Date Value Ref Range Status   08/03/2018 5.54 L/sec      FEFMax-%Pred-Pre   Date Value Ref Range Status   08/03/2018 153 %      ExpTime-Pre   Date Value Ref Range Status   08/03/2018 6.81 sec      FIFMax-Pre   Date  Value Ref Range Status   08/03/2018 3.83 L/sec      FEV1FEV6-Pred   Date Value Ref Range Status   08/03/2018 77 %      FEV1FEV6-Pre   Date Value Ref Range Status   08/03/2018 63 %             Previous Cardiology Imaging   No results found for this or any previous visit (from the past 8760 hour(s)).

## 2019-08-12 NOTE — TELEPHONE ENCOUNTER
Patient also asking about taking her aspirin, she was told to stop it for her testing. She has another week to go without it and the aspirin is for her neck    Call patient to advise  [phone]

## 2019-08-12 NOTE — NURSING NOTE
Diann John's goals for this visit include:   Chief Complaint   Patient presents with     RECHECK     9 month lung nodule f/u. CT chest 08/05/19       She requests these members of her care team be copied on today's visit information: PCP    PCP: Esmer Finley    Referring Provider:  No referring provider defined for this encounter.    BP (!) 153/77 (BP Location: Left arm, Patient Position: Sitting, Cuff Size: Adult Regular)   Pulse 71   Resp 30   Wt 48.7 kg (107 lb 6.4 oz)   LMP 05/10/1978 (Approximate)   SpO2 98%   BMI 20.63 kg/m      Do you need any medication refills at today's visit? No    Darby Doran LPN

## 2019-08-13 NOTE — TELEPHONE ENCOUNTER
This writer attempted to contact Diann on 08/13/19      Reason for call WHAT IS PATIENT ASKING? OR NEEDING? and unable to leave message. Attempted to call patient x3 and phone would  call and then disconnect- unable to get through      If patient calls back:   Ask what patient is needing - if further questions, then send to RN line.        Layne Esquivel RN

## 2019-08-14 NOTE — TELEPHONE ENCOUNTER
Called and spoke with patient. Has concerns about being off of her aspirin for so long, due to hx of carotid stenosis.    Patient had colonoscopy done last Wednesday. For prep was advised to hold her ASA 81 mg tab for one week prior to procedure and then for 2 weeks after procedure and then can start again (total of 3 weeks on hold). It has now been 2 weeks off and patient is concerned should be taking, worried about potential of a stroke if not taking. Was prescribed or recommended due to her history of carotid stenosis, is wondering if it is harmful for her not to be taking the ASA. Notes she did have a few polyps removed during colonoscopy. Has not had any bleeding since procedure. Asking if okay to remain off ASA for another week or if should go back to taking daily. Wants PCP advice on this.  Call back with outcome as soon as given, patient worried about this. Wants staff to leave detailed  message if does not answer phone and goes to .    Routing to provider to review and advise.    Renetta Smith RN

## 2019-08-14 NOTE — TELEPHONE ENCOUNTER
Should be OK to restart baby aspirin once a day. Call if any signs of bleeding.   Esmer Finley MD

## 2019-09-12 ENCOUNTER — OFFICE VISIT (OUTPATIENT)
Dept: OPHTHALMOLOGY | Facility: CLINIC | Age: 84
End: 2019-09-12
Payer: COMMERCIAL

## 2019-09-12 DIAGNOSIS — H43.813 PVD (POSTERIOR VITREOUS DETACHMENT), BILATERAL: ICD-10-CM

## 2019-09-12 DIAGNOSIS — H02.889 MGD (MEIBOMIAN GLAND DISEASE), UNSPECIFIED LATERALITY: ICD-10-CM

## 2019-09-12 DIAGNOSIS — H01.029 SQUAMOUS BLEPHARITIS, UNSPECIFIED LATERALITY: ICD-10-CM

## 2019-09-12 DIAGNOSIS — Z96.1 PSEUDOPHAKIA: Primary | ICD-10-CM

## 2019-09-12 DIAGNOSIS — H52.4 PRESBYOPIA: ICD-10-CM

## 2019-09-12 DIAGNOSIS — H35.371 MACULAR PUCKER, RIGHT EYE: ICD-10-CM

## 2019-09-12 PROCEDURE — 92014 COMPRE OPH EXAM EST PT 1/>: CPT | Performed by: STUDENT IN AN ORGANIZED HEALTH CARE EDUCATION/TRAINING PROGRAM

## 2019-09-12 PROCEDURE — 92015 DETERMINE REFRACTIVE STATE: CPT | Performed by: STUDENT IN AN ORGANIZED HEALTH CARE EDUCATION/TRAINING PROGRAM

## 2019-09-12 RX ORDER — ERYTHROMYCIN 5 MG/G
0.5 OINTMENT OPHTHALMIC DAILY
Qty: 1 TUBE | Refills: 11 | Status: SHIPPED | OUTPATIENT
Start: 2019-09-12 | End: 2020-10-16

## 2019-09-12 ASSESSMENT — REFRACTION_MANIFEST
OD_ADD: +3.00
OS_ADD: +3.00
OS_CYLINDER: +3.25
OS_AXIS: 171
OS_SPHERE: -3.00
OD_SPHERE: -1.00
OD_AXIS: 010
OD_CYLINDER: +3.00

## 2019-09-12 ASSESSMENT — CONF VISUAL FIELD
OD_NORMAL: 1
OS_NORMAL: 1

## 2019-09-12 ASSESSMENT — REFRACTION_WEARINGRX
OD_AXIS: 002
OS_CYLINDER: +2.25
OD_CYLINDER: +4.00
OS_AXIS: 170
SPECS_TYPE: SUNGLASSES
OS_SPHERE: -1.75
OD_SPHERE: -1.25

## 2019-09-12 ASSESSMENT — CUP TO DISC RATIO
OS_RATIO: 0.7
OD_RATIO: 0.7

## 2019-09-12 ASSESSMENT — TONOMETRY
IOP_METHOD: APPLANATION
OS_IOP_MMHG: 18
OD_IOP_MMHG: 21

## 2019-09-12 ASSESSMENT — VISUAL ACUITY
OS_CC+: +2
METHOD_MR: 20/20 BOTH EYES TOGETHER
METHOD: SNELLEN - LINEAR
OS_CC: 20/50
OD_CC+: -2
CORRECTION_TYPE: GLASSES
OD_CC: 20/50

## 2019-09-12 ASSESSMENT — EXTERNAL EXAM - RIGHT EYE: OD_EXAM: NORMAL

## 2019-09-12 ASSESSMENT — EXTERNAL EXAM - LEFT EYE: OS_EXAM: NORMAL

## 2019-09-12 NOTE — PROGRESS NOTES
Current Eye Medications:  fish oil supplement daily and Refresh tears very prn     Subjective:  Here for complete today.  Eyes are dry, was doing the baby shampoo and cream, but couldn't get the prescription in at the store. Then she got used the ointment and it seemed to get worse.  Burned almost.  She is worse in the winter though. Does have chronic rhinitis as well and reactions to milk.      Objective:  See Ophthalmology Exam.      Assessment:  Diann John is a 88 year old female who presents with:     Pseudophakia OU      Macular pucker, right eye      PVD (posterior vitreous detachment), bilateral      Squamous blepharitis, unspecified laterality      MGD (meibomian gland disease), unspecified laterality        Plan:  Continue artificial tears up to four times a day as needed      Could use a gel tear at bedtime or up to four times a day (like Refresh Liquigel or GenTeal Gel Tears)     Continue fish oil supplement    Continue baby shampoo and erythromycin ointment as needed     Lisa Page MD  [phone]

## 2019-09-12 NOTE — LETTER
9/12/2019         RE: Diann John  [address] [id number]        Dear Colleague,    Thank you for referring your patient, Diann John, to the [facility]. Please see a copy of my visit note below.     Current Eye Medications:  fish oil supplement daily and Refresh tears very prn     Subjective:  Here for complete today.  Eyes are dry, was doing the baby shampoo and cream, but couldn't get the prescription in at the store. Then she got used the ointment and it seemed to get worse.  Burned almost.  She is worse in the winter though. Does have chronic rhinitis as well and reactions to milk.      Objective:  See Ophthalmology Exam.      Assessment:  Diann John is a 88 year old female who presents with:     Pseudophakia OU      Macular pucker, right eye      PVD (posterior vitreous detachment), bilateral      Squamous blepharitis, unspecified laterality      MGD (meibomian gland disease), unspecified laterality        Plan:  Continue artificial tears up to four times a day as needed      Could use a gel tear at bedtime or up to four times a day (like Refresh Liquigel or GenTeal Gel Tears)     Continue fish oil supplement    Continue baby shampoo and erythromycin ointment as needed     Lisa Page MD  [phone]             Again, thank you for allowing me to participate in the care of your patient.        Sincerely,        Lisa Page MD

## 2019-09-12 NOTE — PATIENT INSTRUCTIONS
Continue artificial tears up to four times a day as needed      Could use a gel tear at bedtime or up to four times a day (like Refresh Liquigel or GenTeal Gel Tears)     Continue fish oil supplement    Continue baby shampoo and erythromycin ointment as needed     Lisa Page MD  [phone]

## 2019-09-13 ENCOUNTER — TELEPHONE (OUTPATIENT)
Dept: GASTROENTEROLOGY | Facility: CLINIC | Age: 84
End: 2019-09-13

## 2019-09-13 NOTE — TELEPHONE ENCOUNTER
See letter and send to patient per her request. Thank you.     Kane Muhammad PA-C  Gastroenterology  [facility]

## 2019-09-13 NOTE — TELEPHONE ENCOUNTER
Health Call Center    Phone Message    May a detailed message be left on voicemail: yes    Reason for Call: Form or Letter   Type or form/letter needing completion: Medical Necessity- Patients colonoscopy is being denied by Medicare.    Provider: Kane Muhammad  Date form needed: As soon as possible  Once completed: Patient is requesting the letter be mailed to her.        Action Taken: Message routed to:  Adult Clinics: Gastroenterology (GI) p [id number]

## 2019-09-13 NOTE — LETTER
September 13, 2019      To Whom it May Concern:    I am writing to formally request coverage for my patient,  Diann John, for a colonoscopy procedure which was performed at our [facility] facility with our gastroenterologist physician Dr. Morton.    Diann John underwent a colonoscopy which was determined medically appropriate for a positive cologuard test and also due to a family history significant for colon cancer.       Kane Muhammad PA-C  Gastroenterology  [facility]

## 2019-10-18 ENCOUNTER — OFFICE VISIT (OUTPATIENT)
Dept: FAMILY MEDICINE | Facility: CLINIC | Age: 84
End: 2019-10-18
Payer: COMMERCIAL

## 2019-10-18 VITALS
WEIGHT: 107 LBS | SYSTOLIC BLOOD PRESSURE: 133 MMHG | TEMPERATURE: 97.9 F | HEIGHT: 65 IN | HEART RATE: 82 BPM | DIASTOLIC BLOOD PRESSURE: 70 MMHG | OXYGEN SATURATION: 99 % | RESPIRATION RATE: 20 BRPM | BODY MASS INDEX: 17.83 KG/M2

## 2019-10-18 DIAGNOSIS — J44.9 CHRONIC OBSTRUCTIVE PULMONARY DISEASE, UNSPECIFIED COPD TYPE (H): ICD-10-CM

## 2019-10-18 DIAGNOSIS — Z00.00 ENCOUNTER FOR MEDICARE ANNUAL WELLNESS EXAM: Primary | ICD-10-CM

## 2019-10-18 DIAGNOSIS — I10 HYPERTENSION GOAL BP (BLOOD PRESSURE) < 140/90: ICD-10-CM

## 2019-10-18 DIAGNOSIS — I65.22 ASYMPTOMATIC STENOSIS OF LEFT CAROTID ARTERY: ICD-10-CM

## 2019-10-18 DIAGNOSIS — E44.1 MILD PROTEIN-CALORIE MALNUTRITION (H): ICD-10-CM

## 2019-10-18 DIAGNOSIS — I70.90 ASVD (ARTERIOSCLEROTIC VASCULAR DISEASE): ICD-10-CM

## 2019-10-18 DIAGNOSIS — E78.5 HYPERLIPIDEMIA LDL GOAL <70: ICD-10-CM

## 2019-10-18 PROBLEM — E46 PROTEIN-CALORIE MALNUTRITION (H): Status: ACTIVE | Noted: 2019-10-18

## 2019-10-18 LAB
ANION GAP SERPL CALCULATED.3IONS-SCNC: 4 MMOL/L (ref 3–14)
BUN SERPL-MCNC: 18 MG/DL (ref 7–30)
CALCIUM SERPL-MCNC: 9.2 MG/DL (ref 8.5–10.1)
CHLORIDE SERPL-SCNC: 108 MMOL/L (ref 94–109)
CHOLEST SERPL-MCNC: 144 MG/DL
CO2 SERPL-SCNC: 28 MMOL/L (ref 20–32)
CREAT SERPL-MCNC: 0.64 MG/DL (ref 0.52–1.04)
CREAT UR-MCNC: 131 MG/DL
ERYTHROCYTE [DISTWIDTH] IN BLOOD BY AUTOMATED COUNT: 12.7 % (ref 10–15)
GFR SERPL CREATININE-BSD FRML MDRD: 79 ML/MIN/{1.73_M2}
GLUCOSE SERPL-MCNC: 87 MG/DL (ref 70–99)
HCT VFR BLD AUTO: 40.3 % (ref 35–47)
HDLC SERPL-MCNC: 71 MG/DL
HGB BLD-MCNC: 13.1 G/DL (ref 11.7–15.7)
LDLC SERPL CALC-MCNC: 64 MG/DL
MCH RBC QN AUTO: 31.9 PG (ref 26.5–33)
MCHC RBC AUTO-ENTMCNC: 32.5 G/DL (ref 31.5–36.5)
MCV RBC AUTO: 98 FL (ref 78–100)
MICROALBUMIN UR-MCNC: 24 MG/L
MICROALBUMIN/CREAT UR: 18.24 MG/G CR (ref 0–25)
NONHDLC SERPL-MCNC: 73 MG/DL
PLATELET # BLD AUTO: 177 10E9/L (ref 150–450)
POTASSIUM SERPL-SCNC: 3.9 MMOL/L (ref 3.4–5.3)
RBC # BLD AUTO: 4.11 10E12/L (ref 3.8–5.2)
SODIUM SERPL-SCNC: 140 MMOL/L (ref 133–144)
TRIGL SERPL-MCNC: 46 MG/DL
WBC # BLD AUTO: 4.3 10E9/L (ref 4–11)

## 2019-10-18 PROCEDURE — 85027 COMPLETE CBC AUTOMATED: CPT | Performed by: FAMILY MEDICINE

## 2019-10-18 PROCEDURE — G0008 ADMIN INFLUENZA VIRUS VAC: HCPCS | Performed by: FAMILY MEDICINE

## 2019-10-18 PROCEDURE — 82043 UR ALBUMIN QUANTITATIVE: CPT | Performed by: FAMILY MEDICINE

## 2019-10-18 PROCEDURE — 80048 BASIC METABOLIC PNL TOTAL CA: CPT | Performed by: FAMILY MEDICINE

## 2019-10-18 PROCEDURE — 80061 LIPID PANEL: CPT | Performed by: FAMILY MEDICINE

## 2019-10-18 PROCEDURE — 36415 COLL VENOUS BLD VENIPUNCTURE: CPT | Performed by: FAMILY MEDICINE

## 2019-10-18 PROCEDURE — 90662 IIV NO PRSV INCREASED AG IM: CPT | Performed by: FAMILY MEDICINE

## 2019-10-18 PROCEDURE — 99397 PER PM REEVAL EST PAT 65+ YR: CPT | Mod: 25 | Performed by: FAMILY MEDICINE

## 2019-10-18 RX ORDER — ALBUTEROL SULFATE 90 UG/1
2 AEROSOL, METERED RESPIRATORY (INHALATION) EVERY 4 HOURS PRN
Qty: 1 INHALER | Refills: 3 | Status: SHIPPED | OUTPATIENT
Start: 2019-10-18

## 2019-10-18 ASSESSMENT — ACTIVITIES OF DAILY LIVING (ADL)
COGNITION: 0 - NO COGNITION ISSUES REPORTED
TOILETING: 0-->INDEPENDENT
RETIRED_COMMUNICATION: 0-->UNDERSTANDS/COMMUNICATES WITHOUT DIFFICULTY
DRESS: 0-->INDEPENDENT
AMBULATION: 0-->INDEPENDENT
RETIRED_EATING: 0-->INDEPENDENT
TRANSFERRING: 0-->INDEPENDENT
BATHING: 0-->INDEPENDENT
SWALLOWING: 0-->SWALLOWS FOODS/LIQUIDS WITHOUT DIFFICULTY
FALL_HISTORY_WITHIN_LAST_SIX_MONTHS: NO

## 2019-10-18 ASSESSMENT — PAIN SCALES - GENERAL: PAINLEVEL: NO PAIN (0)

## 2019-10-18 ASSESSMENT — MIFFLIN-ST. JEOR: SCORE: 916.23

## 2019-10-18 NOTE — LETTER
2019      Diann John  [address] [id number]              Dear Diann John        Your test results are attached. I am happy to let you know that they are stable and your medications can stay the same.    Your blood test results look great. We can recheck labs in 1 year.      Enclosed is a copy of the results.  It was a pleasure to see you at your last appointment.    If you have any questions or concerns, please call myself or my nurse at [phone].      Sincerely,      Esmer Finley MD, MPH /[facility]URICIO. [facility]ABR[facility]  [employer]      Results for orders placed or performed in visit on 10/18/19   BASIC METABOLIC PANEL   Result Value Ref Range    Sodium 140 133 - 144 mmol/L    Potassium 3.9 3.4 - 5.3 mmol/L    Chloride 108 94 - 109 mmol/L    Carbon Dioxide 28 20 - 32 mmol/L    Anion Gap 4 3 - 14 mmol/L    Glucose 87 70 - 99 mg/dL    Urea Nitrogen 18 7 - 30 mg/dL    Creatinine 0.64 0.52 - 1.04 mg/dL    GFR Estimate 79 >60 mL/min/[1.73_m2]    GFR Estimate If Black >90 >60 mL/min/[1.73_m2]    Calcium 9.2 8.5 - 10.1 mg/dL   Albumin Random Urine Quantitative with Creat Ratio   Result Value Ref Range    Creatinine Urine 131 mg/dL    Albumin Urine mg/L 24 mg/L    Albumin Urine mg/g Cr 18.24 0 - 25 mg/g Cr   Lipid panel reflex to direct LDL Fasting   Result Value Ref Range    Cholesterol 144 <200 mg/dL    Triglycerides 46 <150 mg/dL    HDL Cholesterol 71 >49 mg/dL    LDL Cholesterol Calculated 64 <100 mg/dL    Non HDL Cholesterol 73 <130 mg/dL   CBC with platelets   Result Value Ref Range    WBC 4.3 4.0 - 11.0 10e9/L    RBC Count 4.11 3.8 - 5.2 10e12/L    Hemoglobin 13.1 11.7 - 15.7 g/dL    Hematocrit 40.3 35.0 - 47.0 %    MCV 98 78 - 100 fl    MCH 31.9 26.5 - 33.0 pg    MCHC 32.5 31.5 - 36.5 g/dL    RDW 12.7 10.0 - 15.0 %    Platelet Count 177 150 - 450 10e9/L                 RE: Diann John   MRN: [id number]  : 1931  ENC DATE: Oct 18, 2019

## 2019-10-18 NOTE — PROGRESS NOTES
"  SUBJECTIVE:   Diann John is a 88 year old female who presents for Preventive Visit.      Are you in the first 12 months of your Medicare Part B coverage?  No    Physical Health:    In general, how would you rate your overall physical health? good    Outside of work, how many days during the week do you exercise? 2-3 days/week    Outside of work, approximately how many minutes a day do you exercise?greater than 60 minutes    If you drink alcohol do you typically have >3 drinks per day or >7 drinks per week? No    Do you usually eat at least 4 servings of fruit and vegetables a day, include whole grains & fiber and avoid regularly eating high fat or \"junk\" foods? NO    Do you have any problems taking medications regularly?  No    Do you have any side effects from medications? none    Needs assistance for the following daily activities: no assistance needed    Which of the following safety concerns are present in your home?  none identified     Hearing impairment: Yes,     In the past 6 months, have you been bothered by leaking of urine? no    Mental Health:    In general, how would you rate your overall mental or emotional health? good  PHQ-2 Score:      Do you feel safe in your environment? Yes    Do you have a Health Care Directive? No: Advance care planning was reviewed with patient; patient declined at this time.    Additional concerns to address?  YES, Right leg has been sore for a few  weeks     Fall risk:       Cognitive Screenin) Repeat 3 items (Leader, Season, Table)    2) Clock draw: NORMAL  3) 3 item recall: Recalls NO objects   Results: 0 items recalled: PROBABLE COGNITIVE IMPAIRMENT, **INFORM PROVIDER**    Mini-CogTM Copyright ROCHELLE Solorzano. Licensed by the author for use in [facility]; reprinted with permission (kelly@.[facility]). All rights reserved.      Do you have sleep apnea, excessive snoring or daytime drowsiness?: no        Hypertension Follow-up      Do you check your blood " pressure regularly outside of the clinic? Yes     Are you following a low salt diet? Yes    Are your blood pressures ever more than 140 on the top number (systolic) OR more   than 90 on the bottom number (diastolic), for example 140/90? No    Reviewed and updated as needed this visit by clinical staff  Tobacco  Allergies  Meds         Reviewed and updated as needed this visit by Provider        Social History     Tobacco Use     Smoking status: Former Smoker     Last attempt to quit: 1970     Years since quittin.8     Smokeless tobacco: Never Used   Substance Use Topics     Alcohol use: No                           Current providers sharing in care for this patient include:   Patient Care Team:  Esmer Finley MD as PCP - General (Family Practice)  Esmer Finley MD as Assigned PCP    The following health maintenance items are reviewed in Epic and correct as of today:  Health Maintenance   Topic Date Due     COPD ACTION PLAN  1931     ZOSTER IMMUNIZATION (1 of 2) 1981     MEDICARE ANNUAL WELLNESS VISIT  2017     FALL RISK ASSESSMENT  02/15/2019     INFLUENZA VACCINE (1) 2019     BMP  10/25/2019     MICROALBUMIN  10/25/2019     ADVANCE CARE PLANNING  09/10/2020     EYE EXAM  2020     DTAP/TDAP/TD IMMUNIZATION (3 - Td) 2025     SPIROMETRY  Completed     PHQ-2  Completed     PNEUMOCOCCAL IMMUNIZATION 65+ LOW/MEDIUM RISK  Completed     IPV IMMUNIZATION  Aged Out     MENINGITIS IMMUNIZATION  Aged Out     Lab work is in process  Labs reviewed in EPIC  BP Readings from Last 3 Encounters:   10/18/19 133/70   19 (!) 153/77   19 (!) 159/83    Wt Readings from Last 3 Encounters:   10/18/19 48.5 kg (107 lb)   19 48.7 kg (107 lb 6.4 oz)   19 48.8 kg (107 lb 8 oz)                  Patient Active Problem List   Diagnosis     Macular pucker, right eye     Pseudophakia OU     Blepharitis     Hypertension goal BP (blood pressure) < 140/90     Seasonal  allergies     Carotid artery stenosis, asymptomatic     PVD (posterior vitreous detachment) OU     Advanced directives, counseling/discussion     ASVD (arteriosclerotic vascular disease)     Hyperlipidemia LDL goal <70     Chronic rhinitis     Pulmonary nodules     Pulmonary emphysema, unspecified emphysema type (H)     Family history of colon cancer     Protein-calorie malnutrition (H)     Past Surgical History:   Procedure Laterality Date     APPENDECTOMY       C RAD RESEC TONSIL/PILLARS      Adeniods     CATARACT IOL, RT/LT Right      COLONOSCOPY WITH CO2 INSUFFLATION N/A 2019    Procedure: COLONOSCOPY, WITH CO2 INSUFFLATION;  Surgeon: Faizan Morton MD;  Location:  OR     HAND SURGERY       NOSE SURGERY         Social History     Tobacco Use     Smoking status: Former Smoker     Last attempt to quit: 1970     Years since quittin.8     Smokeless tobacco: Never Used   Substance Use Topics     Alcohol use: No     Family History   Problem Relation Age of Onset     Cancer Father      Glaucoma Father      Cancer Mother      Hypertension Mother      Hypertension Sister      Cerebrovascular Disease Sister      Thyroid Disease No family hx of      Macular Degeneration No family hx of          Current Outpatient Medications   Medication Sig Dispense Refill     albuterol (PROAIR HFA/PROVENTIL HFA/VENTOLIN HFA) 108 (90 Base) MCG/ACT inhaler Inhale 2 puffs into the lungs every 4 hours as needed for shortness of breath / dyspnea or wheezing 1 Inhaler 3     amLODIPine (NORVASC) 2.5 MG tablet TAKE 1 TABLET BY MOUTH ONCE DAILY 90 tablet 3     ascorbic acid (VITAMIN C) 1000 MG TABS Take 1,000 mg by mouth daily       aspirin 81 MG tablet Take 1 tablet (81 mg) by mouth daily 30 tablet      atorvastatin (LIPITOR) 20 MG tablet TAKE 1 TABLET BY MOUTH ONCE DAILY 90 tablet 2     erythromycin (ROMYCIN) 5 MG/GM ophthalmic ointment Place 0.5 inches into both eyes daily Apply small (1/4 inch) strip to affected  "eye(s) 1 Tube 11     fluticasone (FLONASE) 50 MCG/ACT spray Spray 1-2 sprays into both nostrils daily 1 Bottle 11     umeclidinium (INCRUSE ELLIPTA) 62.5 MCG/INH inhaler Inhale 1 puff into the lungs daily 1 Inhaler 3     Calcium-Vitamin D (CALCIUM + D PO) Take 1 tablet by mouth daily.       omega-3 fatty acids (FISH OIL) 1200 MG capsule Take 1 capsule by mouth daily       Allergies   Allergen Reactions     Codeine Other (See Comments)     Heaaches     Lisinopril Cough     Milk Protein Extract Cough     And phlegm        Recent Labs   Lab Test 10/25/18  1118 10/03/17  0728  09/26/16  1206 03/07/16  1555  08/15/13  0859 10/19/12   LDL 57 43  --  74  --    < > 123  --    HDL 61 68  --  57  --    < > 59  --    TRIG 54 58  --  60  --    < > 99  --    ALT  --   --   --   --  33  --  32  --    CR 0.71 0.78   < >  --  0.83   < > 0.76  --    GFRESTIMATED 78 70   < >  --  65   < > 73  --    GFRESTBLACK >90 85   < >  --  79   < > 88  --    POTASSIUM 3.9 4.0   < >  --  3.7   < > 3.8  --    TSH  --   --   --   --   --   --  3.53 2.18    < > = values in this interval not displayed.      Pneumonia Vaccine:Adults age 65+ who received Pneumovax (PPSV23) at 65 years or older: Should be given PCV13 > 1 year after their most recent PPSV23  Mammogram Screening: Patient over age 75, has elected to stop mammography screening.    ROS:  Constitutional, HEENT, cardiovascular, pulmonary, gi and gu systems are negative, except as otherwise noted.    OBJECTIVE:   /70 (BP Location: Right arm, Patient Position: Sitting, Cuff Size: Adult Regular)   Pulse 82   Temp 97.9  F (36.6  C) (Oral)   Resp 20   Ht 1.651 m (5' 5\")   Wt 48.5 kg (107 lb)   LMP 05/10/1978 (Approximate)   SpO2 99%   BMI 17.81 kg/m   Estimated body mass index is 17.81 kg/m  as calculated from the following:    Height as of this encounter: 1.651 m (5' 5\").    Weight as of this encounter: 48.5 kg (107 lb).  EXAM:   GENERAL: elderly, alert, thin but energetic, well " hydrated, no distress  HENT: ear canals- normal; TMs- normal; Nose- normal; Mouth- no ulcers, no lesions, missing dentition  NECK: no tenderness, no adenopathy, no asymmetry, no masses, no stiffness; thyroid- normal to palpation  RESP: lungs clear to auscultation - no rales, no rhonchi, no wheezes  CV: regular rates and rhythm, normal S1 S2, no S3 or S4 and no murmur, no click or rub, normal pulses  ABDOMEN: soft, no tenderness, no  hepatosplenomegaly, no masses, normal bowel sounds  MS: extremities- no gross deformities noted, no edema  SKIN: no suspicious lesions, no rashes, age related skin changes with seborrheic keratosis and no actinic keratosis.    NEURO: strength and tone- normal, sensory exam- grossly normal, reflexes- symmetric  BACK: no CVA tenderness, no paralumbar tenderness  MENTAL STATUS EXAM:  Appearance/Behavior: no apparent distress, neatly groomed, dressed appropriately for weather, appears stated age and is not frail-appearing  Speech: normal  Mood/Affect: normal affect  Insight: Good     Diagnostic Test Results:  Labs reviewed in Epic  Results for orders placed or performed in visit on 10/18/19   CBC with platelets   Result Value Ref Range    WBC 4.3 4.0 - 11.0 10e9/L    RBC Count 4.11 3.8 - 5.2 10e12/L    Hemoglobin 13.1 11.7 - 15.7 g/dL    Hematocrit 40.3 35.0 - 47.0 %    MCV 98 78 - 100 fl    MCH 31.9 26.5 - 33.0 pg    MCHC 32.5 31.5 - 36.5 g/dL    RDW 12.7 10.0 - 15.0 %    Platelet Count 177 150 - 450 10e9/L       ASSESSMENT / PLAN:       ICD-10-CM    1. Encounter for Medicare annual wellness exam Z00.00 Doing very well and independent.    2. Mild protein-calorie malnutrition (H) E44.1 Stable weight and appetite is good   3. Hypertension goal BP (blood pressure) < 140/90 I10 BASIC METABOLIC PANEL     Albumin Random Urine Quantitative with Creat Ratio     CBC with platelets   4. Hyperlipidemia LDL goal <70 E78.5 Lipid panel reflex to direct LDL Fasting   5. Asymptomatic stenosis of left  "carotid artery I65.22 Will have follow up ultrasound   6. ASVD (arteriosclerotic vascular disease) I70.90 Stable    7. Chronic obstructive pulmonary disease, unspecified COPD type (H) J44.9 albuterol (PROAIR HFA/PROVENTIL HFA/VENTOLIN HFA) 108 (90 Base) MCG/ACT inhaler as needed.        End of Life Planning:  Patient currently has an advanced directive: Yes.  Practitioner is supportive of decision.    COUNSELING:  Reviewed preventive health counseling, as reflected in patient instructions       Regular exercise       Healthy diet/nutrition       Vision screening       Hearing screening       Dental care       Bladder control       Fall risk prevention       Osteoporosis Prevention/Bone Health    Estimated body mass index is 17.81 kg/m  as calculated from the following:    Height as of this encounter: 1.651 m (5' 5\").    Weight as of this encounter: 48.5 kg (107 lb).    Weight management plan noted, stable and monitoring     reports that she quit smoking about 49 years ago. She has never used smokeless tobacco.      Appropriate preventive services were discussed with this patient, including applicable screening as appropriate for cardiovascular disease, diabetes, osteopenia/osteoporosis, and glaucoma.  As appropriate for age/gender, discussed screening for colorectal cancer, prostate cancer, breast cancer, and cervical cancer. Checklist reviewing preventive services available has been given to the patient.    Reviewed patients plan of care and provided an AVS. The Intermediate Care Plan ( asthma action plan, low back pain action plan, and migraine action plan) for Diann meets the Care Plan requirement. This Care Plan has been established and reviewed with the Patient.    Counseling Resources:  ATP IV Guidelines  Pooled Cohorts Equation Calculator  Breast Cancer Risk Calculator  FRAX Risk Assessment  ICSI Preventive Guidelines  Dietary Guidelines for Americans, 2010  USDA's MyPlate  ASA Prophylaxis  Lung CA " Screening    Esmer Finley MD  [facility]

## 2019-10-18 NOTE — PATIENT INSTRUCTIONS
At [facility], we strive to deliver an exceptional experience to you, every time we see you.  If you receive a survey in the mail, please send us back your thoughts. We really do value your feedback.    Your care team:                            Family Medicine Internal Medicine   MD Ghulam Santos MD Shantel Branch-Fleming, MD Katya Georgiev PA-C Megan Hill, APRN CNP    Roderick Hager, MD Pediatrics   Chidi Oakley, PANDA Maynard, MD Lily Stanley APRN CNP   MD Jaimie Stockton MD Deborah Mielke, MD Krysten Donohue, APRN [facility]      Clinic hours: Monday - Thursday 7 am-7 pm; Fridays 7 am-5 pm.   Urgent care: Monday - Friday 11 am-9 pm; Saturday and Sunday 9 am-5 pm.  Pharmacy : Monday -Thursday 8 am-8 pm; Friday 8 am-6 pm; Saturday and Sunday 9 am-5 pm.     Clinic: [phone]   Pharmacy: [phone]        Patient Education   Personalized Prevention Plan  You are due for the preventive services outlined below.  Your care team is available to assist you in scheduling these services.  If you have already completed any of these items, please share that information with your care team to update in your medical record.  Health Maintenance Due   Topic Date Due     COPD Action Plan  07/31/1931     Zoster (Shingles) Vaccine (1 of 2) 07/31/1981     Annual Wellness Visit  09/26/2017     FALL RISK ASSESSMENT  02/15/2019     Flu Vaccine (1) 09/01/2019     Basic Metabolic Panel  10/25/2019     Kidney Microalbumin Urine Test  10/25/2019        Patient Education     RICE     Rest an injury, elevate it, and use ice and compression as directed.   RICE stands for rest, ice, compression, and elevation. These can limit pain and swelling after an injury. RICE may be recommended to help treat breaks (fractures), sprains, strains, and bruises or bumps.   Home care  Here are the details of RICE:    Rest. Limit the use of the injured body part. This  helps prevent further damage to the body part and gives it time to heal. In some cases, you may need a sling, brace, splint, or cast to help keep the body part still until it has healed.    Ice. Applying ice right after an injury helps relieve pain and swelling. To make an ice pack, put ice cubes in a plastic bag that seals at the top. Wrap the bag in a clean, thin towel or cloth. Then place it over the injured area. Do this for 10 to 15 minutes every 3 to 4 hours. Continue for the next 1 to 3 days or until your symptoms improve. Never put ice directly on your skin. Don't ice an area longer than 15 minutes at a time.    Compression. Putting pressure on an injury helps reduce swelling and provides support. Wrap the injured area firmly with an elastic bandage or wrap. Make sure not to wrap the bandage too tightly or you will cut off blood flow to the injured area. If your bandage loosens, rewrap it.    Elevation. Keeping an injury raised or elevated above the level of your heart reduces swelling, pain, and throbbing. For instance, if you have a broken leg, it may help to rest your leg on several pillows when sitting or lying down. Try to keep the injured area elevated as often as possible.  Follow-up care  Follow up with your healthcare provider, or as advised.  When to seek medical advice  Call your healthcare provider right away if any of these occur:    Fever of 100.4 F (38 C) or higher, or as directed by your healthcare provider    Chills    Increased pain or swelling in the injured body part    Injured body part becomes cold, blue, numb, or tingly    Signs of infection. These include warmth in the skin, redness, drainage, or bad smell coming from the injured body part.  Date Last Reviewed: 6/1/2018 2000-2018 [employer]. [address] [place] [address], [place] [address]. All rights reserved. This information is not intended as a substitute for professional medical care. Always follow your healthcare  professional's instructions.

## 2019-10-19 NOTE — RESULT ENCOUNTER NOTE
Dear Diann John,    Your test results are attached. I am happy to let you know that they are stable and your medications can stay the same.    Your blood test results look great. We can recheck labs in 1 year.     Please call me if you have any questions about these test results or about your care.    Sincerely,    Esmer Finley MD

## 2019-10-23 DIAGNOSIS — J43.2 CENTRILOBULAR EMPHYSEMA (H): ICD-10-CM

## 2019-10-23 DIAGNOSIS — R06.00 DYSPNEA, UNSPECIFIED TYPE: ICD-10-CM

## 2019-10-23 NOTE — TELEPHONE ENCOUNTER
"Requested Prescriptions   Pending Prescriptions Disp Refills     umeclidinium (INCRUSE ELLIPTA) 62.5 MCG/INH inhaler  Last Written Prescription Date:  05/06/19  Last Fill Quantity: 1,  # refills: 3   Last Office Visit with [facility] or [facility] prescribing provider:  10/18/19-Tushar   Future Office Visit:    1 Inhaler 3     Sig: Inhale 1 puff into the lungs daily       Asthma Maintenance Inhalers - Anticholinergics Passed - 10/23/2019  8:32 AM        Passed - Patient is age 12 years or older        Passed - Recent (12 mo) or future (30 days) visit within the authorizing provider's specialty     Patient has had an office visit with the authorizing provider or a provider within the authorizing providers department within the previous 12 mos or has a future within next 30 days. See \"Patient Info\" tab in inbasket, or \"Choose Columns\" in Meds & Orders section of the refill encounter.              Passed - Medication is active on med list          "

## 2019-10-25 NOTE — TELEPHONE ENCOUNTER
Prescription approved per [facility] Refill Protocol.  Layne Esquivel RN  [facility]

## 2019-10-31 ENCOUNTER — TELEPHONE (OUTPATIENT)
Dept: FAMILY MEDICINE | Facility: CLINIC | Age: 84
End: 2019-10-31

## 2019-10-31 DIAGNOSIS — I65.22 ASYMPTOMATIC STENOSIS OF LEFT CAROTID ARTERY: Primary | ICD-10-CM

## 2019-10-31 NOTE — TELEPHONE ENCOUNTER
Order for ultrasound placed.     Please schedule your DEXA scan or ultrasound by calling [phone].     Esmer Finley MD

## 2019-10-31 NOTE — TELEPHONE ENCOUNTER
Reason for Call:  Other call back    Detailed comments: Pt states that she spoke with provider at last appt about having an ultrasound done and would like a call back concerning if an order will be put in or if the provider has decided to eliminate doing them. Thank you.    Phone Number Patient can be reached at: Home number on file [phone] (home)    Best Time: Any    Can we leave a detailed message on this number? YES    Call taken on 10/31/2019 at 2:16 PM by Janay Wetzel

## 2019-11-07 ENCOUNTER — ANCILLARY PROCEDURE (OUTPATIENT)
Dept: ULTRASOUND IMAGING | Facility: CLINIC | Age: 84
End: 2019-11-07
Attending: FAMILY MEDICINE
Payer: COMMERCIAL

## 2019-11-07 DIAGNOSIS — I65.22 ASYMPTOMATIC STENOSIS OF LEFT CAROTID ARTERY: ICD-10-CM

## 2019-11-07 PROCEDURE — 93880 EXTRACRANIAL BILAT STUDY: CPT | Performed by: RADIOLOGY

## 2019-11-09 NOTE — RESULT ENCOUNTER NOTE
Dear Diann John,    Your test results are attached. I am happy to let you know that they are stable and your medications can stay the same.    The ultrasound of your carotid arteries is stable and you do not need any surgery or additional medication at this time.     Please call me if you have any questions about these test results or about your care.    Sincerely,    Esmer Finley MD

## 2020-01-31 DIAGNOSIS — I65.22 CAROTID ARTERY STENOSIS, ASYMPTOMATIC, LEFT: ICD-10-CM

## 2020-01-31 NOTE — TELEPHONE ENCOUNTER
"Requested Prescriptions   Pending Prescriptions Disp Refills     atorvastatin (LIPITOR) 20 MG tablet [Pharmacy Med Name: Atorvastatin Calcium 20 MG Oral Tablet]  Last Written Prescription Date:  5/6/19  Last Fill Quantity: 90.t,  # refills: 2   Last office visit: 10/18/2019 with prescribing provider:  Esmer Finley MD     Future Office Visit:      0     Sig: TAKE 1 TABLET BY MOUTH ONCE DAILY       Statins Protocol Passed - 1/31/2020  9:24 AM        Passed - LDL on file in past 12 months     Recent Labs   Lab Test 10/18/19  1220   LDL 64             Passed - No abnormal creatine kinase in past 12 months     No lab results found.             Passed - Recent (12 mo) or future (30 days) visit within the authorizing provider's specialty     Patient has had an office visit with the authorizing provider or a provider within the authorizing providers department within the previous 12 mos or has a future within next 30 days. See \"Patient Info\" tab in inbasket, or \"Choose Columns\" in Meds & Orders section of the refill encounter.              Passed - Medication is active on med list        Passed - Patient is age 18 or older        Passed - No active pregnancy on record        Passed - No positive pregnancy test in past 12 months          "

## 2020-02-04 RX ORDER — ATORVASTATIN CALCIUM 20 MG/1
TABLET, FILM COATED ORAL
Qty: 90 TABLET | Refills: 1 | Status: SHIPPED | OUTPATIENT
Start: 2020-02-04 | End: 2020-08-12

## 2020-02-04 NOTE — TELEPHONE ENCOUNTER
Prescription approved per [facility] Refill Protocol.  Layne Esquivel RN  [facility]

## 2020-03-05 ENCOUNTER — OFFICE VISIT (OUTPATIENT)
Dept: FAMILY MEDICINE | Facility: CLINIC | Age: 85
End: 2020-03-05
Payer: COMMERCIAL

## 2020-03-05 VITALS
TEMPERATURE: 97.6 F | HEART RATE: 98 BPM | DIASTOLIC BLOOD PRESSURE: 70 MMHG | WEIGHT: 109 LBS | BODY MASS INDEX: 18.16 KG/M2 | OXYGEN SATURATION: 98 % | SYSTOLIC BLOOD PRESSURE: 138 MMHG | HEIGHT: 65 IN | RESPIRATION RATE: 12 BRPM

## 2020-03-05 DIAGNOSIS — E78.5 HYPERLIPIDEMIA LDL GOAL <70: ICD-10-CM

## 2020-03-05 DIAGNOSIS — E44.1 MILD PROTEIN-CALORIE MALNUTRITION (H): ICD-10-CM

## 2020-03-05 DIAGNOSIS — J44.9 CHRONIC OBSTRUCTIVE PULMONARY DISEASE, UNSPECIFIED COPD TYPE (H): ICD-10-CM

## 2020-03-05 DIAGNOSIS — I10 HYPERTENSION GOAL BP (BLOOD PRESSURE) < 140/90: Primary | ICD-10-CM

## 2020-03-05 DIAGNOSIS — I65.22 ASYMPTOMATIC STENOSIS OF LEFT CAROTID ARTERY: ICD-10-CM

## 2020-03-05 DIAGNOSIS — I70.90 ASVD (ARTERIOSCLEROTIC VASCULAR DISEASE): ICD-10-CM

## 2020-03-05 PROCEDURE — 99214 OFFICE O/P EST MOD 30 MIN: CPT | Performed by: FAMILY MEDICINE

## 2020-03-05 ASSESSMENT — PAIN SCALES - GENERAL: PAINLEVEL: NO PAIN (0)

## 2020-03-05 ASSESSMENT — MIFFLIN-ST. JEOR: SCORE: 925.3

## 2020-03-05 NOTE — PROGRESS NOTES
Subjective     Diann John is a 88 year old female who presents to clinic today for the following health issues:    HPI   Hypertension Follow-up      Do you check your blood pressure regularly outside of the clinic? Yes     Are you following a low salt diet? Yes    Are your blood pressures ever more than 140 on the top number (systolic) OR more   than 90 on the bottom number (diastolic), for example 140/90? Yes      How many servings of fruits and vegetables do you eat daily?  0-1    On average, how many sweetened beverages do you drink each day (Examples: soda, juice, sweet tea, etc.  Do NOT count diet or artificially sweetened beverages)?   0    How many days per week do you exercise enough to make your heart beat faster? 3 or less    How many minutes a day do you exercise enough to make your heart beat faster? 9 or less    How many days per week do you miss taking your medication? 0    Hyperlipidemia Follow-Up      Are you regularly taking any medication or supplement to lower your cholesterol?   Yes- atorvastatin    Are you having muscle aches or other side effects that you think could be caused by your cholesterol lowering medication?  No    Vascular Disease Follow-up      How often do you take nitroglycerin? Never    Do you take an aspirin every day? Yes    COPD Follow-Up    Overall, how are your COPD symptoms since your last clinic visit?  No change    How much fatigue or shortness of breath do you have when you are walking?  Same as usual    How much shortness of breath do you have when you are resting?  Same as usual    How often do you cough? Sometimes    Have you noticed any change in your sputum/phlegm?  No    Have you experienced a recent fever? No    Please describe how far you can walk without stopping to rest:  The length of 1-2 rooms    How many flights of stairs are you able to walk up without stopping?  None    Have you had any Emergency Room Visits, Urgent Care Visits, or Hospital Admissions  because of your COPD since your last office visit?  No    History   Smoking Status     Former Smoker     Quit date: 1970   Smokeless Tobacco     Never Used     No results found for: FEV1, [id number]      Patient Active Problem List   Diagnosis     Macular pucker, right eye     Pseudophakia OU     Blepharitis     Hypertension goal BP (blood pressure) < 140/90     Seasonal allergies     Carotid artery stenosis, asymptomatic     PVD (posterior vitreous detachment) OU     Advanced directives, counseling/discussion     ASVD (arteriosclerotic vascular disease)     Hyperlipidemia LDL goal <70     Chronic rhinitis     Pulmonary nodules     Pulmonary emphysema, unspecified emphysema type (H)     Family history of colon cancer     Protein-calorie malnutrition (H)     Past Surgical History:   Procedure Laterality Date     APPENDECTOMY       C RAD RESEC TONSIL/PILLARS      Adeniods     CATARACT IOL, RT/LT Right      COLONOSCOPY WITH CO2 INSUFFLATION N/A 2019    Procedure: COLONOSCOPY, WITH CO2 INSUFFLATION;  Surgeon: Faizan Morton MD;  Location:  OR     HAND SURGERY       NOSE SURGERY         Social History     Tobacco Use     Smoking status: Former Smoker     Last attempt to quit: 1970     Years since quittin.2     Smokeless tobacco: Never Used   Substance Use Topics     Alcohol use: No     Family History   Problem Relation Age of Onset     Cancer Father      Glaucoma Father      Cancer Mother      Hypertension Mother      Hypertension Sister      Cerebrovascular Disease Sister      Thyroid Disease No family hx of      Macular Degeneration No family hx of          Current Outpatient Medications   Medication Sig Dispense Refill     albuterol (PROAIR HFA/PROVENTIL HFA/VENTOLIN HFA) 108 (90 Base) MCG/ACT inhaler Inhale 2 puffs into the lungs every 4 hours as needed for shortness of breath / dyspnea or wheezing 1 Inhaler 3     amLODIPine (NORVASC) 2.5 MG tablet TAKE 1 TABLET BY MOUTH ONCE DAILY 90  tablet 3     ascorbic acid (VITAMIN C) 1000 MG TABS Take 1,000 mg by mouth daily       aspirin 81 MG tablet Take 1 tablet (81 mg) by mouth daily 30 tablet      atorvastatin (LIPITOR) 20 MG tablet TAKE 1 TABLET BY MOUTH ONCE DAILY 90 tablet 1     Calcium-Vitamin D (CALCIUM + D PO) Take 1 tablet by mouth daily.       erythromycin (ROMYCIN) 5 MG/GM ophthalmic ointment Place 0.5 inches into both eyes daily Apply small (1/4 inch) strip to affected eye(s) 1 Tube 11     fluticasone (FLONASE) 50 MCG/ACT spray Spray 1-2 sprays into both nostrils daily 1 Bottle 11     omega-3 fatty acids (FISH OIL) 1200 MG capsule Take 1 capsule by mouth daily       umeclidinium (INCRUSE ELLIPTA) 62.5 MCG/INH inhaler Inhale 1 puff into the lungs daily 1 Inhaler 11     Allergies   Allergen Reactions     Codeine Other (See Comments)     Heaaches     Lisinopril Cough     Milk Protein Extract Cough     And phlegm        Recent Labs   Lab Test 10/18/19  1220 10/25/18  1118 10/03/17  0728  03/07/16  1555  08/15/13  0859 10/19/12   LDL 64 57 43   < >  --    < > 123  --    HDL 71 61 68   < >  --    < > 59  --    TRIG 46 54 58   < >  --    < > 99  --    ALT  --   --   --   --  33  --  32  --    CR 0.64 0.71 0.78   < > 0.83   < > 0.76  --    GFRESTIMATED 79 78 70   < > 65   < > 73  --    GFRESTBLACK >90 >90 85   < > 79   < > 88  --    POTASSIUM 3.9 3.9 4.0   < > 3.7   < > 3.8  --    TSH  --   --   --   --   --   --  3.53 2.18    < > = values in this interval not displayed.      BP Readings from Last 3 Encounters:   03/05/20 138/70   10/18/19 133/70   08/12/19 (!) 153/77    Wt Readings from Last 3 Encounters:   03/05/20 49.4 kg (109 lb)   10/18/19 48.5 kg (107 lb)   08/12/19 48.7 kg (107 lb 6.4 oz)              Reviewed and updated as needed this visit by Provider  Tobacco         Review of Systems   ROS COMP: Constitutional, HEENT, cardiovascular, pulmonary, gi and gu systems are negative, except as otherwise noted.      Objective    /70    "Pulse 98   Temp 97.6  F (36.4  C) (Oral)   Resp 12   Ht 1.651 m (5' 5\")   Wt 49.4 kg (109 lb)   LMP 05/10/1978 (Approximate)   SpO2 98%   BMI 18.14 kg/m    Body mass index is 18.14 kg/m .  Physical Exam   GENERAL: elderly, alert, well nourished, well hydrated, no distress  HENT: ear canals- normal; TMs- normal; Nose- normal; Mouth- no ulcers, no lesions, missing dentition  NECK: no tenderness, no adenopathy, no asymmetry, no masses, no stiffness; thyroid- normal to palpation  RESP: lungs clear to auscultation - no rales, no rhonchi, no wheezes  CV: regular rates and rhythm, normal S1 S2, no S3 or S4 and no murmur, no click or rub, normal pulses  ABDOMEN: soft, no tenderness, no  hepatosplenomegaly, no masses, normal bowel sounds  MS: extremities- no gross deformities noted, no edema  SKIN: no suspicious lesions, no rashes, age related skin changes with seborrheic keratosis and no actinic keratosis.    NEURO: strength and tone- decreased, sensory exam- grossly normal, reflexes- symmetric  BACK: no CVA tenderness, no paralumbar tenderness  MENTAL STATUS EXAM:  Appearance/Behavior: no apparent distress, neatly groomed, dressed appropriately for weather, appears stated age and is frail-appearing  Speech: normal  Mood/Affect: normal affect  Insight: Fair     Diagnostic Test Results:  Labs reviewed in Epic  No results found for this or any previous visit (from the past 24 hour(s)).      Office Visit on 10/18/2019   Component Date Value Ref Range Status     Sodium 10/18/2019 140  133 - 144 mmol/L Final     Potassium 10/18/2019 3.9  3.4 - 5.3 mmol/L Final     Chloride 10/18/2019 108  94 - 109 mmol/L Final     Carbon Dioxide 10/18/2019 28  20 - 32 mmol/L Final     Anion Gap 10/18/2019 4  3 - 14 mmol/L Final     Glucose 10/18/2019 87  70 - 99 mg/dL Final     Urea Nitrogen 10/18/2019 18  7 - 30 mg/dL Final     Creatinine 10/18/2019 0.64  0.52 - 1.04 mg/dL Final     GFR Estimate 10/18/2019 79  >60 mL/min/[1.73_m2] Final "    Comment: Non  GFR Calc  Starting 12/18/2018, serum creatinine based estimated GFR (eGFR) will be   calculated using the Chronic Kidney Disease Epidemiology Collaboration   (CKD-EPI) equation.       GFR Estimate If Black 10/18/2019 >90  >60 mL/min/[1.73_m2] Final    Comment:  GFR Calc  Starting 12/18/2018, serum creatinine based estimated GFR (eGFR) will be   calculated using the Chronic Kidney Disease Epidemiology Collaboration   (CKD-EPI) equation.       Calcium 10/18/2019 9.2  8.5 - 10.1 mg/dL Final     Creatinine Urine 10/18/2019 131  mg/dL Final     Albumin Urine mg/L 10/18/2019 24  mg/L Final     Albumin Urine mg/g Cr 10/18/2019 18.24  0 - 25 mg/g Cr Final     Cholesterol 10/18/2019 144  <200 mg/dL Final     Triglycerides 10/18/2019 46  <150 mg/dL Final     HDL Cholesterol 10/18/2019 71  >49 mg/dL Final     LDL Cholesterol Calculated 10/18/2019 64  <100 mg/dL Final    Desirable:       <100 mg/dl     Non HDL Cholesterol 10/18/2019 73  <130 mg/dL Final     WBC 10/18/2019 4.3  4.0 - 11.0 10e9/L Final     RBC Count 10/18/2019 4.11  3.8 - 5.2 10e12/L Final     Hemoglobin 10/18/2019 13.1  11.7 - 15.7 g/dL Final     Hematocrit 10/18/2019 40.3  35.0 - 47.0 % Final     MCV 10/18/2019 98  78 - 100 fl Final     MCH 10/18/2019 31.9  26.5 - 33.0 pg Final     MCHC 10/18/2019 32.5  31.5 - 36.5 g/dL Final     RDW 10/18/2019 12.7  10.0 - 15.0 % Final     Platelet Count 10/18/2019 177  150 - 450 10e9/L Final        Assessment & Plan       ICD-10-CM    1. Hypertension goal BP (blood pressure) < 140/90 I10 Well controlled on medications    2. Hyperlipidemia LDL goal <70 E78.5 Stable    3. Asymptomatic stenosis of left carotid artery I65.22 70% stenosis on left side and not interested in intervention.    4. ASVD (arteriosclerotic vascular disease) I70.90 Stable without current chest pain or shortness of breath    5. Mild protein-calorie malnutrition (H) E44.1 Eating somewhat better now.    6.  Chronic obstructive pulmonary disease, unspecified COPD type (H) J44.9 Lungs stable, no upper respiratory infection symptoms           FURTHER TESTING:       - carotid ultrasound yearly  FUTURE LABS:       - Schedule fasting labs in 6 months  FUTURE APPOINTMENTS:       - Follow-up visit in 6 months.   Work on weight loss  Regular exercise  See Patient Instructions    Return in about 6 months (around 9/5/2020) for Physical Exam, Lab Work, medication follow up.    Esmer Finley MD  [facility]

## 2020-03-05 NOTE — PATIENT INSTRUCTIONS
Patient Education     Carotid Artery Problems: Surgery for TIAs    You have narrowing of a blood vessel or carotid artery in your neck. It is caused by a substance called plaque that has built up in your artery. This can cause a transient ischemic attack or TIA. During a TIA, narrowing in the artery blocks blood to your brain for a short time. Fortunately, the damage to the brain is not permanent and symptoms resolve within 24 hours. However, a TIA is a warning that a stroke may happen in the near future. You and your doctor will discuss the best course of treatment for you.  Your treatment plan  The need for surgery depends on your symptoms. It also depends on how narrow your carotid artery is. If you have mild narrowing but have TIAs, you may need surgery. If you have severe narrowing and no TIAs, you may need surgery. This is because your risk of stroke may be high.  If surgery is needed, you will have a carotid endarterectomy. The surgery removes plaque from inside of your carotid artery. The inside of your carotid artery is made wider and smoother. This reduces the chance of having a stroke.  Date Last Reviewed: 5/1/2017 2000-2019 [employer]. [address] [place] [address]. All rights reserved. This information is not intended as a substitute for professional medical care. Always follow your healthcare professional's instructions.           Patient Education     Established High Blood Pressure    High blood pressure (hypertension) is a chronic disease. Often, healthcare providers don t know what causes it. But it can be caused by certain health conditions and medicines.  If you have high blood pressure, you may not have any symptoms. If you do have symptoms, they may include headache, dizziness, changes in your vision, chest pain, and shortness of breath. But even without symptoms, high blood pressure that s not treated raises your risk for heart attack, heart failure, and stroke.  High blood pressure is a serious health risk and shouldn t be ignored.  Blood pressure measurements are given as 2 numbers. Systolic blood pressure is the upper number. This is the pressure when the heart contracts. Diastolic blood pressure is the lower number. This is the pressure when the heart relaxes between beats. You will see your blood pressure readings written together. For example, a person with a systolic pressure of 118 and a diastolic pressure of 78 will have 118/78 written in the medical record.  Blood pressure is categorized as normal, elevated, or stage 1 or stage 2 high blood pressure:    Normal blood pressure is systolic of less than 120 and diastolic of less than 80 (120/80)    Elevated blood pressure is systolic of 120 to 129 and diastolic less than 80    Stage 1 high blood pressure is systolic is 130 to 139 or diastolic between 80 to 89    Stage 2 high blood pressure is when systolic is 140 or higher or the diastolic is 90 or higher  Home care  If you have high blood pressure, follow these home care guidelines to help lower your blood pressure. If you are taking medicines for high blood pressure, these methods may reduce or end your need for medicines in the future.    Start a weight-loss program if you are overweight.    Cut back on how much salt you get in your diet. Here s how to do this:  ? Don t eat foods that have a lot of salt. These include olives, pickles, smoked meats, and salted potato chips.  ? Don t add salt to your food at the table.  ? Use only small amounts of salt when cooking.    Start an exercise program. Talk with your healthcare provider about the type of exercise program that would be best for you. It doesn't have to be hard. Even brisk walking for 20 minutes 3 times a week is a good form of exercise.    Don t take medicines that stimulate the heart. This includes many over-the-counter cold and sinus decongestant pills and sprays, as well as diet pills. Check the warnings  about high blood pressure on the label. Before buying any over-the-counter medicines or supplements, always ask the pharmacist about the product's potential interaction with your high blood pressure and your high blood pressure medicines.    Stimulants such as amphetamine or cocaine could be deadly for someone with high blood pressure. Never take these.    Limit how much caffeine you get in your diet. Switch to caffeine-free products.    Stop smoking. If you are a long-time smoker, this can be hard. Talk to your healthcare provider about medicines and nicotine replacement options to help you. Also, enroll in a stop-smoking program to make it more likely that you will quit for good.    Learn how to handle stress. This is an important part of any program to lower blood pressure. Learn about relaxation methods like meditation, yoga, or biofeedback.    If your provider prescribed medicines, take them exactly as directed. Missing doses may cause your blood pressure get out of control.    If you miss a dose or doses, check with your healthcare provider or pharmacist about what to do.    Consider buying an automatic blood pressure machine to check your blood pressure at home. Ask your provider for a recommendation. You can get one of these at most pharmacies.     The American Heart Association recommends the following guidelines for home blood pressure monitoring:    Don't smoke or drink coffee for 30 minutes before taking your blood pressure.    Go to the bathroom before the test.    Relax for 5 minutes before taking the measurement.    Sit with your back supported (don't sit on a couch or soft chair); keep your feet on the floor uncrossed. Place your arm on a solid flat surface (like a table) with the upper part of the arm at heart level. Place the middle of the cuff directly above the bend of the elbow. Check the monitor's instruction manual for an illustration.    Take multiple readings. When you measure, take 2 to 3  readings one minute apart and record all of the results.    Take your blood pressure at the same time every day, or as your healthcare provider recommends.    Record the date, time, and blood pressure reading.    Take the record with you to your next medical appointment. If your blood pressure monitor has a built-in memory, simply take the monitor with you to your next appointment.    Call your provider if you have several high readings. Don't be frightened by a single high blood pressure reading, but if you get several high readings, check in with your healthcare provider.    Note: When blood pressure reaches a systolic (top number) of 180 or higher OR diastolic (bottom number) of 110 or higher, seek emergency medical treatment.  Follow-up care  You will need to see your healthcare provider regularly. This is to check your blood pressure and to make changes to your medicines. Make a follow-up appointment as directed. Bring the record of your home blood pressure readings to the appointment.  When to seek medical advice  Call your healthcare provider right away if any of these occur:    Blood pressure reaches a systolic (upper number) of 180 or higher OR a diastolic (bottom number) of 110 or higher    Chest pain or shortness of breath    Severe headache    Throbbing or rushing sound in the ears    Nosebleed    Sudden severe pain in your belly (abdomen)    Extreme drowsiness, confusion, or fainting    Dizziness or spinning sensation (vertigo)    Weakness of an arm or leg or one side of the face    You have problems speaking or seeing   Date Last Reviewed: 12/1/2016 2000-2019 [employer]. [address] [place] [address], [place] [address]. All rights reserved. This information is not intended as a substitute for professional medical care. Always follow your healthcare professional's instructions.

## 2020-03-07 ENCOUNTER — NURSE TRIAGE (OUTPATIENT)
Dept: NURSING | Facility: CLINIC | Age: 85
End: 2020-03-07

## 2020-03-07 ENCOUNTER — OFFICE VISIT (OUTPATIENT)
Dept: URGENT CARE | Facility: URGENT CARE | Age: 85
End: 2020-03-07
Payer: COMMERCIAL

## 2020-03-07 VITALS
DIASTOLIC BLOOD PRESSURE: 78 MMHG | OXYGEN SATURATION: 98 % | BODY MASS INDEX: 18.47 KG/M2 | HEART RATE: 85 BPM | WEIGHT: 111 LBS | TEMPERATURE: 97.8 F | SYSTOLIC BLOOD PRESSURE: 158 MMHG

## 2020-03-07 DIAGNOSIS — R30.0 DYSURIA: ICD-10-CM

## 2020-03-07 DIAGNOSIS — N30.00 ACUTE CYSTITIS WITHOUT HEMATURIA: Primary | ICD-10-CM

## 2020-03-07 DIAGNOSIS — R82.90 NONSPECIFIC FINDING ON EXAMINATION OF URINE: ICD-10-CM

## 2020-03-07 LAB
ALBUMIN UR-MCNC: 30 MG/DL
APPEARANCE UR: ABNORMAL
BACTERIA #/AREA URNS HPF: ABNORMAL /HPF
BILIRUB UR QL STRIP: NEGATIVE
COLOR UR AUTO: YELLOW
GLUCOSE UR STRIP-MCNC: NEGATIVE MG/DL
HGB UR QL STRIP: ABNORMAL
KETONES UR STRIP-MCNC: NEGATIVE MG/DL
LEUKOCYTE ESTERASE UR QL STRIP: ABNORMAL
NITRATE UR QL: NEGATIVE
NON-SQ EPI CELLS #/AREA URNS LPF: ABNORMAL /LPF
PH UR STRIP: 5.5 PH (ref 5–7)
RBC #/AREA URNS AUTO: ABNORMAL /HPF
SOURCE: ABNORMAL
SP GR UR STRIP: 1.01 (ref 1–1.03)
UROBILINOGEN UR STRIP-ACNC: 0.2 EU/DL (ref 0.2–1)
WBC #/AREA URNS AUTO: >100 /HPF

## 2020-03-07 PROCEDURE — 81001 URINALYSIS AUTO W/SCOPE: CPT | Performed by: PHYSICIAN ASSISTANT

## 2020-03-07 PROCEDURE — 87088 URINE BACTERIA CULTURE: CPT | Performed by: PHYSICIAN ASSISTANT

## 2020-03-07 PROCEDURE — 87086 URINE CULTURE/COLONY COUNT: CPT | Performed by: PHYSICIAN ASSISTANT

## 2020-03-07 PROCEDURE — 99213 OFFICE O/P EST LOW 20 MIN: CPT | Performed by: PHYSICIAN ASSISTANT

## 2020-03-07 PROCEDURE — 87186 SC STD MICRODIL/AGAR DIL: CPT | Performed by: PHYSICIAN ASSISTANT

## 2020-03-07 RX ORDER — CEFDINIR 300 MG/1
300 CAPSULE ORAL 2 TIMES DAILY
Qty: 14 CAPSULE | Refills: 0 | Status: SHIPPED | OUTPATIENT
Start: 2020-03-07 | End: 2020-06-17

## 2020-03-07 RX ORDER — SULFAMETHOXAZOLE/TRIMETHOPRIM 800-160 MG
1 TABLET ORAL 2 TIMES DAILY
Qty: 10 TABLET | Refills: 0 | Status: SHIPPED | OUTPATIENT
Start: 2020-03-07 | End: 2020-03-07

## 2020-03-07 NOTE — TELEPHONE ENCOUNTER
"Yanira from [facility] in [place] calling. Diann was prescribed Bactrim today at [facility] but is reporting an allergy to sulfa drugs.    FNA contacted [facility]. New prescription will be sent. Updated pharmacist at [facility].    Alivia Watson, RN, BSN  [facility]    Reason for Disposition    Pharmacy calling with prescription questions and triager unable to answer question    Additional Information    Negative: Drug overdose and nurse unable to answer question    Negative: Caller requesting information not related to medicine    Negative: Caller requesting a prescription for Strep throat and has a positive culture result    Negative: Rash while taking a medication or within 3 days of stopping it    Negative: Immunization reaction suspected    Negative: [1] Asthma and [2] having symptoms of asthma (cough, wheezing, etc)    Negative: MORE THAN A DOUBLE DOSE of a prescription or over-the-counter (OTC) drug    Negative: [1] DOUBLE DOSE (an extra dose or lesser amount) of over-the-counter (OTC) drug AND [2] any symptoms (e.g., dizziness, nausea, pain, sleepiness)    Negative: [1] DOUBLE DOSE (an extra dose or lesser amount) of prescription drug AND [2] any symptoms (e.g., dizziness, nausea, pain, sleepiness)    Negative: Took another person's prescription drug    Negative: [1] Prescription not at pharmacy AND [2] was prescribed today by PCP    Negative: [1] Request for URGENT new prescription or refill of \"essential\" medication (i.e., likelihood of harm to patient if not taken) AND [2] triager unable to fill per unit policy    Negative: Diabetes drug error or overdose (e.g., insulin or extra dose)    Negative: [1] DOUBLE DOSE (an extra dose or lesser amount) of prescription drug AND [2] NO symptoms (Exception: a double dose of antibiotics)    Protocols used: MEDICATION QUESTION CALL-A-AH      "

## 2020-03-07 NOTE — TELEPHONE ENCOUNTER
"C/o dysuria, urinary frequency and L sided low back pain since yesterday. Feeling tired, \"not up to par\". No fever. No visible hematuria. Advised see provider w/i 4 hrs. She will go to  Urgent Care now.     Reason for Disposition    Side (flank) or lower back pain present    Additional Information    [1] Discomfort (pain, burning or stinging) when passing urine AND [2] female    Negative: Shock suspected (e.g., cold/pale/clammy skin, too weak to stand, low BP, rapid pulse)    Negative: Sounds like a life-threatening emergency to the triager    Negative: Followed a genital area injury    Negative: Taking antibiotic for urinary tract infection (UTI)    Negative: Pregnant    Negative: Postpartum < 1 month    Negative: [1] Unable to urinate (or only a few drops) > 4 hours AND [2] bladder feels very full (e.g., palpable bladder or strong urge to urinate)    Negative: Vomiting    Negative: Patient sounds very sick or weak to the triager    Negative: [1] SEVERE pain with urination  (e.g., excruciating) AND [2] not improved after 2 hours of pain medicine and Sitz bath    Negative: Fever > 100.5 F (38.1 C)    Protocols used: URINATION PAIN - FEMALE-A-AH, URINARY SYMPTOMS-A-AH      "

## 2020-03-07 NOTE — PROGRESS NOTES
S:89 yo female is here for dysuria and urinary frequency since yesterday.  No nausea, vomiting, fever, rash.  No abdominal pain.  Some intermittent left lower back discomfort.        Allergies   Allergen Reactions     Codeine Other (See Comments)     Ottoniel     Lisinopril Cough     Milk Protein Extract Cough     And phlegm          Past Medical History:   Diagnosis Date     Hypertension      Nonsenile cataract      Postherpetic neuralgia      Shingles     3 x      Steroid responders 2014       albuterol (PROAIR HFA/PROVENTIL HFA/VENTOLIN HFA) 108 (90 Base) MCG/ACT inhaler, Inhale 2 puffs into the lungs every 4 hours as needed for shortness of breath / dyspnea or wheezing  amLODIPine (NORVASC) 2.5 MG tablet, TAKE 1 TABLET BY MOUTH ONCE DAILY  ascorbic acid (VITAMIN C) 1000 MG TABS, Take 1,000 mg by mouth daily  aspirin 81 MG tablet, Take 1 tablet (81 mg) by mouth daily  atorvastatin (LIPITOR) 20 MG tablet, TAKE 1 TABLET BY MOUTH ONCE DAILY  Calcium-Vitamin D (CALCIUM + D PO), Take 1 tablet by mouth daily.  erythromycin (ROMYCIN) 5 MG/GM ophthalmic ointment, Place 0.5 inches into both eyes daily Apply small (1/4 inch) strip to affected eye(s)  fluticasone (FLONASE) 50 MCG/ACT spray, Spray 1-2 sprays into both nostrils daily  omega-3 fatty acids (FISH OIL) 1200 MG capsule, Take 1 capsule by mouth daily  umeclidinium (INCRUSE ELLIPTA) 62.5 MCG/INH inhaler, Inhale 1 puff into the lungs daily    DOBUTamine 500 mg in dextrose 5% 250 mL (adult std)        Social History     Tobacco Use     Smoking status: Former Smoker     Last attempt to quit: 1970     Years since quittin.2     Smokeless tobacco: Never Used   Substance Use Topics     Alcohol use: No     Drug use: No       ROS:  General: negative for fever  ABD: Denies abd pain  : as above    OBJECTIVE:  BP (!) 190/78 (BP Location: Left arm, Patient Position: Chair, Cuff Size: Adult Regular)   Pulse 85   Temp 97.8  F (36.6  C) (Oral)   Wt 50.3 kg (111  lb)   LMP 05/10/1978 (Approximate)   SpO2 98%   BMI 18.47 kg/m      LMP 05/10/1978 (Approximate)    General:   awake, alert, and cooperative.  NAD.   Head: Normocephalic, atraumatic.  Eyes: Conjunctiva clear, non icteric.   ABD: soft, no tenderness to palpation , no rigidity, guarding or rebound . No CVAT  Neuro: Alert and oriented - normal speech.     10/18/19 12:20 PM [id number]    Component Value Flag Ref Range Units Status Collected Lab   Sodium 140   133 - 144 mmol/L Final 10/18/2019 12:20 PM MG   Potassium 3.9   3.4 - 5.3 mmol/L Final 10/18/2019 12:20 PM MG   Chloride 108   94 - 109 mmol/L Final 10/18/2019 12:20 PM MG   Carbon Dioxide 28   20 - 32 mmol/L Final 10/18/2019 12:20 PM MG   Anion Gap 4   3 - 14 mmol/L Final 10/18/2019 12:20 PM MG   Glucose 87   70 - 99 mg/dL Final 10/18/2019 12:20 PM MG   Urea Nitrogen 18   7 - 30 mg/dL Final 10/18/2019 12:20 PM MG   Creatinine 0.64   0.52 - 1.04 mg/dL Final 10/18/2019 12:20 PM MG   GFR Estimate 79   >60 mL/min/ Final 10/18/2019 12:20 PM MG   Comment:       ASSESSMENT:      ICD-10-CM    1. Acute cystitis without hematuria N30.00 cefdinir (OMNICEF) 300 MG capsule     DISCONTINUED: sulfamethoxazole-trimethoprim (BACTRIM DS) 800-160 MG tablet   2. Dysuria R30.0 *UA reflex to Microscopic and Culture ([place] [facility] (except [place] and [place])     Urine Microscopic     cefdinir (OMNICEF) 300 MG capsule     DISCONTINUED: sulfamethoxazole-trimethoprim (BACTRIM DS) 800-160 MG tablet   3. Nonspecific finding on examination of urine R82.90 Urine Culture Aerobic Bacterial             PLAN:   As per ordered above.  Drink plenty of fluids.  Prevention and treatment of UTI's discussed. Follow up with primary care physician if not improving.  Advised about symptoms which might herald more serious problems.      Addendum-pharmacy called.  The have Sulfa as a listed allergy.  Her chart does not list it but will switch to cefdinir.  Prescription sent. Cancelled  Bactgatito Lindsay PA-C

## 2020-03-11 LAB
BACTERIA SPEC CULT: ABNORMAL
BACTERIA SPEC CULT: ABNORMAL
Lab: ABNORMAL
SPECIMEN SOURCE: ABNORMAL

## 2020-06-10 ENCOUNTER — VIRTUAL VISIT (OUTPATIENT)
Dept: FAMILY MEDICINE | Facility: CLINIC | Age: 85
End: 2020-06-10
Payer: COMMERCIAL

## 2020-06-10 VITALS — SYSTOLIC BLOOD PRESSURE: 138 MMHG | DIASTOLIC BLOOD PRESSURE: 85 MMHG

## 2020-06-10 DIAGNOSIS — K59.00 CONSTIPATION, UNSPECIFIED CONSTIPATION TYPE: ICD-10-CM

## 2020-06-10 DIAGNOSIS — J44.9 CHRONIC OBSTRUCTIVE PULMONARY DISEASE, UNSPECIFIED COPD TYPE (H): ICD-10-CM

## 2020-06-10 DIAGNOSIS — R30.0 DYSURIA: Primary | ICD-10-CM

## 2020-06-10 DIAGNOSIS — I10 HYPERTENSION GOAL BP (BLOOD PRESSURE) < 140/90: ICD-10-CM

## 2020-06-10 PROCEDURE — 99214 OFFICE O/P EST MOD 30 MIN: CPT | Mod: 95 | Performed by: PREVENTIVE MEDICINE

## 2020-06-10 ASSESSMENT — PAIN SCALES - GENERAL: PAINLEVEL: MODERATE PAIN (5)

## 2020-06-10 NOTE — PROGRESS NOTES
"Diann John is a 88 year old female who is being evaluated via a billable telephone visit.      The patient has been notified of following:     \"This telephone visit will be conducted via a call between you and your physician/provider. We have found that certain health care needs can be provided without the need for a physical exam.  This service lets us provide the care you need with a short phone conversation.  If a prescription is necessary we can send it directly to your pharmacy.  If lab work is needed we can place an order for that and you can then stop by our lab to have the test done at a later time.    Telephone visits are billed at different rates depending on your insurance coverage. During this emergency period, for some insurers they may be billed the same as an in-person visit.  Please reach out to your insurance provider with any questions.    If during the course of the call the physician/provider feels a telephone visit is not appropriate, you will not be charged for this service.\"    Patient has given verbal consent for Telephone visit?  Yes    What phone number would you like to be contacted at? [phone]    How would you like to obtain your AVS? Montez Rocha     Diann John is a 88 year old female who presents via phone visit today for the following health issues:    HPI  Constipation     Onset: a week     Description:   Frequency of bowel movements: 24 hours.  Stool consistency: hard at 1st     Progression of Symptoms:  improving    Accompanying Signs & Symptoms:  Abdominal pain (cramping?): YES  Blood in stool: no   Rectal pain: no   Nausea/vomiting: no   Weight loss or gain: YES   History:   History of abdominal surgery: YES    Precipitating factors:   Recent use of narcotics, anticholinergics, calcium channel blockers, antacids, or iron supplements: Pt doesn't know   Chronic Laxative Use: YES         Therapies Tried and outcome: change in diet, herbal fiber blend has been " helping     A little better now    Has had a combination of constipation and urine burning    Has had more acid    Symptoms have been better with herbal fiber     No bloating     No rectal bleeding    No melena    Has had lower appetite     Dysuria:  Has had some urine symptoms as well the last few days   Has had some burning  No fever  Increased frequency at first every 5 minutes  Has been drinking lemonade for her symptoms   Last UTI in 2020   Sulfa medication did not tolerate well       Hypertension Follow-up      Do you check your blood pressure regularly outside of the clinic? Yes     Are you following a low salt diet? Yes    Are your blood pressures ever more than 140 on the top number (systolic) OR more   than 90 on the bottom number (diastolic), for example 140/90? No     COPD:  -breathing stable       Patient Active Problem List   Diagnosis     Macular pucker, right eye     Pseudophakia OU     Blepharitis     Hypertension goal BP (blood pressure) < 140/90     Seasonal allergies     Carotid artery stenosis, asymptomatic     PVD (posterior vitreous detachment) OU     Advanced directives, counseling/discussion     ASVD (arteriosclerotic vascular disease)     Hyperlipidemia LDL goal <70     Chronic rhinitis     Pulmonary nodules     Pulmonary emphysema, unspecified emphysema type (H)     Family history of colon cancer     Protein-calorie malnutrition (H)     Past Surgical History:   Procedure Laterality Date     APPENDECTOMY       C RAD RESEC TONSIL/PILLARS      Adeniods     CATARACT IOL, RT/LT Right      COLONOSCOPY WITH CO2 INSUFFLATION N/A 2019    Procedure: COLONOSCOPY, WITH CO2 INSUFFLATION;  Surgeon: Faizan Morton MD;  Location:  OR     HAND SURGERY       NOSE SURGERY         Social History     Tobacco Use     Smoking status: Former Smoker     Last attempt to quit: 1970     Years since quittin.4     Smokeless tobacco: Never Used   Substance Use Topics     Alcohol use: No      Family History   Problem Relation Age of Onset     Cancer Father      Glaucoma Father      Cancer Mother      Hypertension Mother      Hypertension Sister      Cerebrovascular Disease Sister      Thyroid Disease No family hx of      Macular Degeneration No family hx of          Current Outpatient Medications   Medication Sig Dispense Refill     albuterol (PROAIR HFA/PROVENTIL HFA/VENTOLIN HFA) 108 (90 Base) MCG/ACT inhaler Inhale 2 puffs into the lungs every 4 hours as needed for shortness of breath / dyspnea or wheezing 1 Inhaler 3     amLODIPine (NORVASC) 2.5 MG tablet TAKE 1 TABLET BY MOUTH ONCE DAILY 90 tablet 3     ascorbic acid (VITAMIN C) 1000 MG TABS Take 1,000 mg by mouth daily       aspirin 81 MG tablet Take 1 tablet (81 mg) by mouth daily 30 tablet      atorvastatin (LIPITOR) 20 MG tablet TAKE 1 TABLET BY MOUTH ONCE DAILY 90 tablet 1     Calcium-Vitamin D (CALCIUM + D PO) Take 1 tablet by mouth daily.       erythromycin (ROMYCIN) 5 MG/GM ophthalmic ointment Place 0.5 inches into both eyes daily Apply small (1/4 inch) strip to affected eye(s) 1 Tube 11     fluticasone (FLONASE) 50 MCG/ACT spray Spray 1-2 sprays into both nostrils daily 1 Bottle 11     omega-3 fatty acids (FISH OIL) 1200 MG capsule Take 1 capsule by mouth daily       umeclidinium (INCRUSE ELLIPTA) 62.5 MCG/INH inhaler Inhale 1 puff into the lungs daily 1 Inhaler 11     Allergies   Allergen Reactions     Codeine Other (See Comments)     [place]     Lisinopril Cough     Milk Protein Extract Cough     And phlegm        BP Readings from Last 3 Encounters:   06/10/20 138/85   03/07/20 (!) 158/78   03/05/20 138/70    Wt Readings from Last 3 Encounters:   03/07/20 50.3 kg (111 lb)   03/05/20 49.4 kg (109 lb)   10/18/19 48.5 kg (107 lb)           Reviewed and updated as needed this visit by Provider  Tobacco  Allergies  Meds  Problems  Med Hx  Surg Hx  Fam Hx         Review of Systems   Constitutional, HEENT, cardiovascular, pulmonary,  gi and gu systems are negative, except as otherwise noted.       Objective   Reported vitals:  /85   LMP 05/10/1978 (Approximate)   Breastfeeding No    healthy, alert and no distress  PSYCH: Alert and oriented times 3; coherent speech, normal   rate and volume, able to articulate logical thoughts, able   to abstract reason, no tangential thoughts, no hallucinations   or delusions  Her affect is normal  RESP: No cough, no audible wheezing, able to talk in full sentences  Remainder of exam unable to be completed due to telephone visits    Diagnostic Test Results:  Labs reviewed in Epic        Assessment/Plan:  1. Dysuria  -will come in for labs  -last UTI 3/2020  -wait for UA results, if positive for infection then start antibiotics  -prefers not to use Sulfa   - UA reflex to Microscopic and Culture; Future  - Urine Culture Aerobic Bacterial; Future  - Urine culture if sent can take up to 48 hours, antibiotics may need to be changed once susceptibilities are available.  Stay well hydrated.  Contact clinic if develop fever over 101 F, hematuria, vomiting, or flank pain.      2. Hypertension goal BP (blood pressure) < 140/90  -at goal  -continue current medication     3. Chronic obstructive pulmonary disease, unspecified COPD type (H)  -stable    4. Constipation, unspecified constipation type  -Intermittent  -better now with fiber supplements       Return in about 1 day (around 6/11/2020) for Lab Work.      Phone call duration:  20 minutes      Patricia Jenkins MD MPH

## 2020-06-10 NOTE — PATIENT INSTRUCTIONS
At [facility], we strive to deliver an exceptional experience to you, every time we see you. If you receive a survey, please complete it as we do value your feedback.  If you have MyChart, you can expect to receive results automatically within 24 hours of their completion.  Your provider will send a note interpreting your results as well.   If you do not have MyChart, you should receive your results in about a week by mail.    Your care team:                            Family Medicine Internal Medicine   MD Ghulam Santos MD Shantel Branch-Fleming, MD Katya Georgiev PA-C Megan Hill, APRLAM Hager, MD Pediatrics   Chidi Oakley, PAANTONI Maynard, MD Lily Stanley APRN CNP   MD Jaimie Stockton MD Deborah Mielke, MD Kim Thein, APRN [facility]      Clinic hours: Monday - Thursday 7 am-7 pm; Fridays 7 am-5 pm.   Urgent care: Monday - Friday 11 am-9 pm; Saturday and Sunday 9 am-5 pm.    Clinic: [phone]       [facility]: Monday - Thursday 8 am - 7 pm; Friday 8 am - 6 pm  [facility]: [phone]     Use [email or web address] for 24/7 diagnosis and treatment of dozens of conditions.

## 2020-06-17 ENCOUNTER — VIRTUAL VISIT (OUTPATIENT)
Dept: FAMILY MEDICINE | Facility: CLINIC | Age: 85
End: 2020-06-17
Payer: COMMERCIAL

## 2020-06-17 DIAGNOSIS — Z09 HOSPITAL DISCHARGE FOLLOW-UP: ICD-10-CM

## 2020-06-17 DIAGNOSIS — N39.0 E. COLI UTI (URINARY TRACT INFECTION): Primary | ICD-10-CM

## 2020-06-17 DIAGNOSIS — B96.20 E. COLI UTI (URINARY TRACT INFECTION): Primary | ICD-10-CM

## 2020-06-17 DIAGNOSIS — N39.0 E. COLI UTI (URINARY TRACT INFECTION): ICD-10-CM

## 2020-06-17 DIAGNOSIS — R30.0 DYSURIA: ICD-10-CM

## 2020-06-17 DIAGNOSIS — B96.20 E. COLI UTI (URINARY TRACT INFECTION): ICD-10-CM

## 2020-06-17 LAB
ALBUMIN UR-MCNC: NEGATIVE MG/DL
APPEARANCE UR: CLEAR
BACTERIA #/AREA URNS HPF: ABNORMAL /HPF
BILIRUB UR QL STRIP: NEGATIVE
COLOR UR AUTO: YELLOW
GLUCOSE UR STRIP-MCNC: NEGATIVE MG/DL
HGB UR QL STRIP: NEGATIVE
KETONES UR STRIP-MCNC: NEGATIVE MG/DL
LEUKOCYTE ESTERASE UR QL STRIP: NEGATIVE
NITRATE UR QL: NEGATIVE
NON-SQ EPI CELLS #/AREA URNS LPF: ABNORMAL /LPF
PH UR STRIP: 6 PH (ref 5–7)
RBC #/AREA URNS AUTO: ABNORMAL /HPF
SOURCE: ABNORMAL
SP GR UR STRIP: <=1.005 (ref 1–1.03)
UROBILINOGEN UR STRIP-ACNC: 0.2 EU/DL (ref 0.2–1)
WBC #/AREA URNS AUTO: ABNORMAL /HPF

## 2020-06-17 PROCEDURE — 87086 URINE CULTURE/COLONY COUNT: CPT | Performed by: PREVENTIVE MEDICINE

## 2020-06-17 PROCEDURE — 81001 URINALYSIS AUTO W/SCOPE: CPT | Performed by: INTERNAL MEDICINE

## 2020-06-17 PROCEDURE — 99213 OFFICE O/P EST LOW 20 MIN: CPT | Mod: 95 | Performed by: INTERNAL MEDICINE

## 2020-06-17 RX ORDER — CEFDINIR 300 MG/1
300 CAPSULE ORAL
COMMUNITY
Start: 2020-06-14 | End: 2020-06-21

## 2020-06-17 RX ORDER — ESTRADIOL 0.1 MG/G
CREAM VAGINAL
Qty: 42.5 G | Refills: 8 | Status: SHIPPED | OUTPATIENT
Start: 2020-06-17 | End: 2020-07-09 | Stop reason: SINTOL

## 2020-06-17 NOTE — PROGRESS NOTES
"Diann John is a 88 year old female who is being evaluated via a billable telephone visit.      The patient has been notified of following:     \"This telephone visit will be conducted via a call between you and your physician/provider. We have found that certain health care needs can be provided without the need for a physical exam.  This service lets us provide the care you need with a short phone conversation.  If a prescription is necessary we can send it directly to your pharmacy.  If lab work is needed we can place an order for that and you can then stop by our lab to have the test done at a later time.    Telephone visits are billed at different rates depending on your insurance coverage. During this emergency period, for some insurers they may be billed the same as an in-person visit.  Please reach out to your insurance provider with any questions.    If during the course of the call the physician/provider feels a telephone visit is not appropriate, you will not be charged for this service.\"    Patient has given verbal consent for Telephone visit?  Yes    What phone number would you like to be contacted at? [phone]    How would you like to obtain your AVS? Montez Rocha     Diann John is a 88 year old female who presents via phone visit today for the following health issues:    HPI    Hospital Follow-up Visit:    Hospital/Nursing Home/IP Rehab Facility: [facility]  Date of Admission: 06/10/20  Date of Discharge: 06/13/20  Reason(s) for Admission: Acute pyelonephritis.  Complications: Transient renal failure. Serum creatinine upon discharge is 0.76. WBC count last 6/10/2020 is 9,700.      Was your hospitalization related to COVID-19? No   Problems taking medications regularly:  None  Medication changes since discharge: Updated  Problems adhering to non-medication therapy:  None    Summary of hospitalization:  CareEverywhere information obtained and reviewed  Diagnostic " Tests/Treatments reviewed.  Follow up needed: Yes.  Other Healthcare Providers Involved in Patient s Care:         None  Update since discharge: improved. Post Discharge Medication Reconciliation: discharge medications reconciled, continue medications without change.  Plan of care communicated with patient     Dysuria before visit, x 2 days, severe flank pain prior to ER visit. Patient is concerned about her recurring UTIs for the past 4 years. She denies any bladder prolapse. Diann is not immunosuppressed. Urine culture last 3/7/2020 shows E. Coli that is resistant to Bactrim and Ampicillin.          Patient Active Problem List   Diagnosis     Macular pucker, right eye     Pseudophakia OU     Blepharitis     Hypertension goal BP (blood pressure) < 140/90     Seasonal allergies     Carotid artery stenosis, asymptomatic     PVD (posterior vitreous detachment) OU     Advanced directives, counseling/discussion     ASVD (arteriosclerotic vascular disease)     Hyperlipidemia LDL goal <70     Chronic rhinitis     Pulmonary nodules     Pulmonary emphysema, unspecified emphysema type (H)     Family history of colon cancer     Protein-calorie malnutrition (H)     Past Surgical History:   Procedure Laterality Date     APPENDECTOMY       C RAD RESEC TONSIL/PILLARS      Adeniods     CATARACT IOL, RT/LT Right      COLONOSCOPY WITH CO2 INSUFFLATION N/A 2019    Procedure: COLONOSCOPY, WITH CO2 INSUFFLATION;  Surgeon: Faizan Morton MD;  Location:  OR     HAND SURGERY       NOSE SURGERY         Social History     Tobacco Use     Smoking status: Former Smoker     Last attempt to quit: 1970     Years since quittin.4     Smokeless tobacco: Never Used   Substance Use Topics     Alcohol use: No     Family History   Problem Relation Age of Onset     Cancer Father      Glaucoma Father      Cancer Mother      Hypertension Mother      Hypertension Sister      Cerebrovascular Disease Sister      Thyroid Disease No  family hx of      Macular Degeneration No family hx of          Allergies   Allergen Reactions     Codeine Other (See Comments)     [place]     Lisinopril Cough     Milk Protein Extract Cough     And phlegm        Recent Labs   Lab Test 10/18/19  1220 10/25/18  1118 10/03/17  0728  03/07/16  1555  08/15/13  0859 10/19/12   LDL 64 57 43   < >  --    < > 123  --    HDL 71 61 68   < >  --    < > 59  --    TRIG 46 54 58   < >  --    < > 99  --    ALT  --   --   --   --  33  --  32  --    CR 0.64 0.71 0.78   < > 0.83   < > 0.76  --    GFRESTIMATED 79 78 70   < > 65   < > 73  --    GFRESTBLACK >90 >90 85   < > 79   < > 88  --    POTASSIUM 3.9 3.9 4.0   < > 3.7   < > 3.8  --    TSH  --   --   --   --   --   --  3.53 2.18    < > = values in this interval not displayed.      BP Readings from Last 3 Encounters:   06/10/20 138/85   03/07/20 (!) 158/78   03/05/20 138/70    Wt Readings from Last 3 Encounters:   03/07/20 50.3 kg (111 lb)   03/05/20 49.4 kg (109 lb)   10/18/19 48.5 kg (107 lb)                    Reviewed and updated as needed this visit by Provider         Review of Systems   CONSTITUTIONAL: NEGATIVE for fever, chills, change in weight  INTEGUMENTARY/SKIN: NEGATIVE for worrisome rashes, moles or lesions  EYES: NEGATIVE for vision changes or irritation  ENT/MOUTH: NEGATIVE for ear, mouth and throat problems  RESP: NEGATIVE for significant cough or SOB  BREAST: NEGATIVE for masses, tenderness or discharge  CV: NEGATIVE for chest pain, palpitations or peripheral edema  GI: NEGATIVE for nausea, abdominal pain, heartburn, or change in bowel habits  : NEGATIVE for frequency, dysuria, or hematuria  MUSCULOSKELETAL: NEGATIVE for significant arthralgias or myalgia  NEURO: NEGATIVE for weakness, dizziness or paresthesias  ENDOCRINE: NEGATIVE for temperature intolerance, skin/hair changes  HEME: NEGATIVE for bleeding problems  PSYCHIATRIC: NEGATIVE for changes in mood or affect       Objective   Reported vitals:    Vital  signs not obtained due to nature of visit.    Remainder of exam unable to be completed due to telephone visits    Diagnostic Test Results:  Epic reviewed.        Assessment/Plan:  1. E. coli UTI (urinary tract infection)    - UA with Microscopic reflex to Culture; Future    2. Hospital discharge follow-up    - UA with Microscopic reflex to Culture; Future    Follow-up in one week or earlier as needed.    Phone call duration:  16 minutes  Start: 10:02 AM  End: 10:18 AM    Ghulam Salmeron MD

## 2020-06-17 NOTE — PATIENT INSTRUCTIONS
At [facility], we strive to deliver an exceptional experience to you, every time we see you. If you receive a survey, please complete it as we do value your feedback.  If you have MyChart, you can expect to receive results automatically within 24 hours of their completion.  Your provider will send a note interpreting your results as well.   If you do not have MyChart, you should receive your results in about a week by mail.    Your care team:                            Family Medicine Internal Medicine   MD Ghulam Santos MD Shantel Branch-Fleming, MD Katya Georgiev PA-C Megan Hill, APRLAM Hager, MD Pediatrics   Chidi Oakley, PAANTONI Maynard, MD Lily Stanley APRN CNP   MD Jaimie Stockton MD Deborah Mielke, MD Kim Thein, APRN [facility]      Clinic hours: Monday - Thursday 7 am-7 pm; Fridays 7 am-5 pm.   Urgent care: Monday - Friday 11 am-9 pm; Saturday and Sunday 9 am-5 pm.    Clinic: [phone]       [facility]: Monday - Thursday 8 am - 7 pm; Friday 8 am - 6 pm  [facility]: [phone]     Use [email or web address] for 24/7 diagnosis and treatment of dozens of conditions.

## 2020-06-17 NOTE — LETTER
June 19, 2020      Diann John  [address] [address]        Dear ,      Urine culture collected on 6/17/2020 is not showing any bacterial growth.   Please let me know if you have any questions and thank you for choosing [facility].     Regards,       Patricia Jenkins MD MPH       Results for orders placed or performed in visit on 06/17/20   UA with Microscopic reflex to Culture     Status: Abnormal    Specimen: Midstream Urine   Result Value Ref Range    Color Urine Yellow     Appearance Urine Clear     Glucose Urine Negative NEG^Negative mg/dL    Bilirubin Urine Negative NEG^Negative    Ketones Urine Negative NEG^Negative mg/dL    Specific Gravity Urine <=1.005 1.003 - 1.035    pH Urine 6.0 5.0 - 7.0 pH    Protein Albumin Urine Negative NEG^Negative mg/dL    Urobilinogen Urine 0.2 0.2 - 1.0 EU/dL    Nitrite Urine Negative NEG^Negative    Blood Urine Negative NEG^Negative    Leukocyte Esterase Urine Negative NEG^Negative    Source Midstream Urine     WBC Urine 0 - 5 OTO5^0 - 5 /HPF    RBC Urine O - 2 OTO2^O - 2 /HPF    Squamous Epithelial /LPF Urine Few FEW^Few /LPF    Bacteria Urine Few (A) NEG^Negative /HPF   Urine Culture Aerobic Bacterial     Status: None    Specimen: Midstream Urine   Result Value Ref Range    Specimen Description Midstream Urine     Culture Micro No growth

## 2020-06-18 LAB
BACTERIA SPEC CULT: NO GROWTH
SPECIMEN SOURCE: NORMAL

## 2020-06-19 NOTE — RESULT ENCOUNTER NOTE
Please send a letter:    Dear Diann John,    Urine culture collected on 6/17/2020 is not showing any bacterial growth.  Please let me know if you have any questions and thank you for choosing [facility].    Regards,    Patricia Jenkins MD MPH

## 2020-07-09 ENCOUNTER — VIRTUAL VISIT (OUTPATIENT)
Dept: FAMILY MEDICINE | Facility: CLINIC | Age: 85
End: 2020-07-09
Payer: COMMERCIAL

## 2020-07-09 DIAGNOSIS — I10 HYPERTENSION GOAL BP (BLOOD PRESSURE) < 140/90: ICD-10-CM

## 2020-07-09 DIAGNOSIS — N39.0 E. COLI UTI (URINARY TRACT INFECTION): Primary | ICD-10-CM

## 2020-07-09 DIAGNOSIS — B96.20 E. COLI UTI (URINARY TRACT INFECTION): Primary | ICD-10-CM

## 2020-07-09 PROCEDURE — 99213 OFFICE O/P EST LOW 20 MIN: CPT | Mod: 95 | Performed by: FAMILY MEDICINE

## 2020-07-09 NOTE — PATIENT INSTRUCTIONS
At [facility], we strive to deliver an exceptional experience to you, every time we see you.  If you receive a survey in the mail, please send us back your thoughts. We really do value your feedback.    Based on your medical history, these are the current health maintenance/preventive care services that you are due for (some may have been done at this visit.)  Health Maintenance Due   Topic Date Due     COPD ACTION PLAN  07/31/1931     ZOSTER IMMUNIZATION (1 of 2) 07/31/1981     FALL RISK ASSESSMENT  02/15/2019         Suggested websites for health information:  Www.DigiSynd.setObject : Up to date and easily searchable information on multiple topics.  Www.PiCloud.gov : medication info, interactive tutorials, watch real surgeries online  Www.familydoctor.org : good info from the Academy of Family Physicians  Www.cdc.gov : public health info, travel advisories, epidemics (H1N1)  Www.aap.org : children's health info, normal development, vaccinations  Www.health.[facility].[place].us : [place] dept of health, public health issues in [place], N1N1    Your care team:                            Family Medicine Internal Medicine   MD Ghulam Santos MD Shantel Branch-Fleming, MD Katya Georgiev PA-C Nam Ho, MD Pediatrics   PANDA Hernandez, YVETTE HAYWOOD CNP   MD Jaimie Stockton MD Deborah Mielke, MD Kim Thein, APRN [facility]      Clinic hours: Monday - Thursday 7 am-7 pm; Fridays 7 am-5 pm.   Urgent care: Monday - Friday 11 am-9 pm; Saturday and Sunday 9 am-5 pm.  Pharmacy : Monday -Thursday 8 am-8 pm; Friday 8 am-6 pm; Saturday and Sunday 9 am-5 pm.     Clinic: [phone]   Pharmacy: [phone]    Patient Education     Bladder Infection, Female (Adult)    Urine is normally doesn't have any bacteria in it. But bacteria can get into the urinary tract from the skin around the rectum. Or they can travel in the blood from elsewhere in the body. Once they  "are in your urinary tract, they can cause infection in the urethra (urethritis), the bladder (cystitis), or the kidneys (pyelonephritis).  The most common place for an infection is in the bladder. This is called a bladder infection. This is one of the most common infections in women. Most bladder infections are easily treated. They are not serious unless the infection spreads to the kidney.  The phrases \"bladder infection,\" \"UTI,\" and \"cystitis\" are often used to describe the same thing. But they are not always the same. Cystitis is an inflammation of the bladder. The most common cause of cystitis is an infection.  Symptoms  The infection causes inflammation in the urethra and bladder. This causes many of the symptoms. The most common symptoms of a bladder infection are:    Pain or burning when urinating    Having to urinate more often than usual    Urgent need to urinate    Only a small amount of urine comes out    Blood in urine    Abdominal discomfort. This is usually in the lower abdomen above the pubic bone.    Cloudy urine    Strong- or bad-smelling urine    Unable to urinate (urinary retention)    Unable to hold urine in (urinary incontinence)    Fever    Loss of appetite    Confusion (in older adults)  Causes  Bladder infections are not contagious. You can't get one from someone else, from a toilet seat, or from sharing a bath.  The most common cause of bladder infections is bacteria from the bowels. The bacteria get onto the skin around the opening of the urethra. From there, they can get into the urine and travel up to the bladder, causing inflammation and infection. This usually happens because of:    Wiping improperly after urinating. Always wipe from front to back.    Bowel incontinence    Pregnancy    Procedures such as having a catheter inserted    Older age    Not emptying your bladder. This can allow bacteria a chance to grow in your urine.    Dehydration    Constipation    Sex    Use of a diaphragm " for birth control   Treatment  Bladder infections are diagnosed by a urine test. They are treated with antibiotics and usually clear up quickly without complications. Treatment helps prevent a more serious kidney infection.  Medicines  Medicines can help in the treatment of a bladder infection:    Take antibiotics until they are used up, even if you feel better. It is important to finish them to make sure the infection has cleared.    You can use acetaminophen or ibuprofen for pain, fever, or discomfort, unless another medicine was prescribed. If you have chronic liver or kidney disease, talk with your healthcare provider before using these medicines. Also talk with your provider if you've ever had a stomach ulcer or gastrointestinal bleeding, or are taking blood-thinner medicines.    If you are given phenazopydridine to reduce burning with urination, it will cause your urine to become a bright orange color. This can stain clothing.  Care and prevention  These self-care steps can help prevent future infections:    Drink plenty of fluids to prevent dehydration and flush out your bladder. Do this unless you must restrict fluids for other health reasons, or your doctor told you not to.    Proper cleaning after going to the bathroom is important. Wipe from front to back after using the toilet to prevent the spread of bacteria.    Urinate more often. Don't try to hold urine in for a long time.    Wear loose-fitting clothes and cotton underwear. Avoid tight-fitting pants.    Improve your diet and prevent constipation. Eat more fresh fruit and vegetables, and fiber, and less junk and fatty foods.    Avoid sex until your symptoms are gone.    Avoid caffeine, alcohol, and spicy foods. These can irritate your bladder.    Urinate right after intercourse to flush out your bladder.    If you use birth control pills and have frequent bladder infections, discuss it with your doctor.  Follow-up care  Call your healthcare provider  if all symptoms are not gone after 3 days of treatment. This is especially important if you have repeat infections.  If a culture was done, you will be told if your treatment needs to be changed. If directed, you can call to find out the results.  If X-rays were done, you will be told if the results will affect your treatment.  Call 911  Call 911 if any of the following occur:    Trouble breathing    Hard to wake up or confusion    Fainting or loss of consciousness    Rapid heart rate  When to seek medical advice  Call your healthcare provider right away if any of these occur:    Fever of 100.4 F (38.0 C) or higher, or as directed by your healthcare provider    Symptoms are not better by the third day of treatment    Back or belly (abdominal) pain that gets worse    Repeated vomiting, or unable to keep medicine down    Weakness or dizziness    Vaginal discharge    Pain, redness, or swelling in the outer vaginal area (labia)  Date Last Reviewed: 10/1/2016    [id number] [employer]. [address] [place] [address], [place] [address]. All rights reserved. This information is not intended as a substitute for professional medical care. Always follow your healthcare professional's instructions.

## 2020-08-10 DIAGNOSIS — I65.22 CAROTID ARTERY STENOSIS, ASYMPTOMATIC, LEFT: ICD-10-CM

## 2020-08-10 DIAGNOSIS — I10 ESSENTIAL HYPERTENSION WITH GOAL BLOOD PRESSURE LESS THAN 140/90: ICD-10-CM

## 2020-08-12 RX ORDER — ATORVASTATIN CALCIUM 20 MG/1
TABLET, FILM COATED ORAL
Qty: 90 TABLET | Refills: 0 | Status: SHIPPED | OUTPATIENT
Start: 2020-08-12 | End: 2020-11-03

## 2020-08-12 RX ORDER — AMLODIPINE BESYLATE 2.5 MG/1
TABLET ORAL
Qty: 90 TABLET | Refills: 0 | Status: SHIPPED | OUTPATIENT
Start: 2020-08-12 | End: 2020-11-03

## 2020-10-15 ENCOUNTER — OFFICE VISIT (OUTPATIENT)
Dept: FAMILY MEDICINE | Facility: CLINIC | Age: 85
End: 2020-10-15
Payer: COMMERCIAL

## 2020-10-15 VITALS
HEART RATE: 82 BPM | WEIGHT: 107 LBS | BODY MASS INDEX: 17.81 KG/M2 | TEMPERATURE: 97.8 F | DIASTOLIC BLOOD PRESSURE: 70 MMHG | OXYGEN SATURATION: 97 % | SYSTOLIC BLOOD PRESSURE: 152 MMHG

## 2020-10-15 DIAGNOSIS — K59.01 SLOW TRANSIT CONSTIPATION: ICD-10-CM

## 2020-10-15 DIAGNOSIS — J84.112 IPF (IDIOPATHIC PULMONARY FIBROSIS) (H): ICD-10-CM

## 2020-10-15 DIAGNOSIS — Z00.00 ROUTINE GENERAL MEDICAL EXAMINATION AT A HEALTH CARE FACILITY: Primary | ICD-10-CM

## 2020-10-15 DIAGNOSIS — E78.5 HYPERLIPIDEMIA LDL GOAL <70: ICD-10-CM

## 2020-10-15 DIAGNOSIS — I10 HYPERTENSION GOAL BP (BLOOD PRESSURE) < 140/90: ICD-10-CM

## 2020-10-15 DIAGNOSIS — I65.22 ASYMPTOMATIC STENOSIS OF LEFT CAROTID ARTERY: ICD-10-CM

## 2020-10-15 DIAGNOSIS — E44.1 MILD PROTEIN-CALORIE MALNUTRITION (H): ICD-10-CM

## 2020-10-15 DIAGNOSIS — N39.0 RECURRENT UTI: ICD-10-CM

## 2020-10-15 DIAGNOSIS — I70.90 ASVD (ARTERIOSCLEROTIC VASCULAR DISEASE): ICD-10-CM

## 2020-10-15 LAB
ALBUMIN SERPL-MCNC: 4 G/DL (ref 3.4–5)
ALBUMIN UR-MCNC: NEGATIVE MG/DL
ALT SERPL W P-5'-P-CCNC: 37 U/L (ref 0–50)
ANION GAP SERPL CALCULATED.3IONS-SCNC: 6 MMOL/L (ref 3–14)
APPEARANCE UR: CLEAR
BACTERIA #/AREA URNS HPF: ABNORMAL /HPF
BILIRUB UR QL STRIP: NEGATIVE
BUN SERPL-MCNC: 16 MG/DL (ref 7–30)
CALCIUM SERPL-MCNC: 8.9 MG/DL (ref 8.5–10.1)
CHLORIDE SERPL-SCNC: 104 MMOL/L (ref 94–109)
CHOLEST SERPL-MCNC: 147 MG/DL
CO2 SERPL-SCNC: 26 MMOL/L (ref 20–32)
COLOR UR AUTO: YELLOW
CREAT SERPL-MCNC: 0.75 MG/DL (ref 0.52–1.04)
ERYTHROCYTE [DISTWIDTH] IN BLOOD BY AUTOMATED COUNT: 11.7 % (ref 10–15)
GFR SERPL CREATININE-BSD FRML MDRD: 70 ML/MIN/{1.73_M2}
GLUCOSE SERPL-MCNC: 83 MG/DL (ref 70–99)
GLUCOSE UR STRIP-MCNC: NEGATIVE MG/DL
HCT VFR BLD AUTO: 40.8 % (ref 35–47)
HDLC SERPL-MCNC: 73 MG/DL
HGB BLD-MCNC: 13.5 G/DL (ref 11.7–15.7)
HGB UR QL STRIP: ABNORMAL
KETONES UR STRIP-MCNC: NEGATIVE MG/DL
LDLC SERPL CALC-MCNC: 60 MG/DL
LEUKOCYTE ESTERASE UR QL STRIP: NEGATIVE
MCH RBC QN AUTO: 31 PG (ref 26.5–33)
MCHC RBC AUTO-ENTMCNC: 33.1 G/DL (ref 31.5–36.5)
MCV RBC AUTO: 94 FL (ref 78–100)
NITRATE UR QL: NEGATIVE
NON-SQ EPI CELLS #/AREA URNS LPF: ABNORMAL /LPF
NONHDLC SERPL-MCNC: 74 MG/DL
PH UR STRIP: 7 PH (ref 5–7)
PHOSPHATE SERPL-MCNC: 3.6 MG/DL (ref 2.5–4.5)
PLATELET # BLD AUTO: 196 10E9/L (ref 150–450)
POTASSIUM SERPL-SCNC: 4.2 MMOL/L (ref 3.4–5.3)
RBC # BLD AUTO: 4.35 10E12/L (ref 3.8–5.2)
RBC #/AREA URNS AUTO: ABNORMAL /HPF
SODIUM SERPL-SCNC: 136 MMOL/L (ref 133–144)
SOURCE: ABNORMAL
SP GR UR STRIP: 1.01 (ref 1–1.03)
TRIGL SERPL-MCNC: 68 MG/DL
UROBILINOGEN UR STRIP-ACNC: 0.2 EU/DL (ref 0.2–1)
WBC # BLD AUTO: 4.6 10E9/L (ref 4–11)
WBC #/AREA URNS AUTO: ABNORMAL /HPF

## 2020-10-15 PROCEDURE — 81001 URINALYSIS AUTO W/SCOPE: CPT | Performed by: FAMILY MEDICINE

## 2020-10-15 PROCEDURE — 80061 LIPID PANEL: CPT | Performed by: FAMILY MEDICINE

## 2020-10-15 PROCEDURE — 85027 COMPLETE CBC AUTOMATED: CPT | Performed by: FAMILY MEDICINE

## 2020-10-15 PROCEDURE — 84460 ALANINE AMINO (ALT) (SGPT): CPT | Performed by: FAMILY MEDICINE

## 2020-10-15 PROCEDURE — 99397 PER PM REEVAL EST PAT 65+ YR: CPT | Performed by: FAMILY MEDICINE

## 2020-10-15 PROCEDURE — 36415 COLL VENOUS BLD VENIPUNCTURE: CPT | Performed by: FAMILY MEDICINE

## 2020-10-15 PROCEDURE — 80069 RENAL FUNCTION PANEL: CPT | Performed by: FAMILY MEDICINE

## 2020-10-15 RX ORDER — SENNA AND DOCUSATE SODIUM 50; 8.6 MG/1; MG/1
1 TABLET, FILM COATED ORAL AT BEDTIME
Qty: 30 TABLET | Refills: 3 | Status: SHIPPED | OUTPATIENT
Start: 2020-10-15 | End: 2021-01-01

## 2020-10-15 RX ORDER — POLYETHYLENE GLYCOL 3350 17 G/17G
1 POWDER, FOR SOLUTION ORAL DAILY
Qty: 578 G | Refills: 3 | Status: SHIPPED | OUTPATIENT
Start: 2020-10-15 | End: 2021-01-01

## 2020-10-15 NOTE — PATIENT INSTRUCTIONS
Preventive Health Recommendations    See your health care provider every year to    Review health changes.     Discuss preventive care.      Review your medicines if your doctor has prescribed any.      You no longer need a yearly Pap test unless you've had an abnormal Pap test in the past 10 years. If you have vaginal symptoms, such as bleeding or discharge, be sure to talk with your provider about a Pap test.      Every 1 to 2 years, have a mammogram.  If you are over 69, talk with your health care provider about whether or not you want to continue having screening mammograms.      Every 10 years, have a colonoscopy. Or, have a yearly FIT test (stool test). These exams will check for colon cancer.       Have a cholesterol test every 5 years, or more often if your doctor advises it.       Have a diabetes test (fasting glucose) every three years. If you are at risk for diabetes, you should have this test more often.       At age 65, have a bone density scan (DEXA) to check for osteoporosis (brittle bone disease).    Shots:    Get a flu shot each year.    Get a tetanus shot every 10 years.    Talk to your doctor about your pneumonia vaccines. There are now two you should receive - Pneumovax (PPSV 23) and Prevnar (PCV 13).    Talk to your pharmacist about the shingles vaccine.    Talk to your doctor about the hepatitis B vaccine.    Nutrition:     Eat at least 5 servings of fruits and vegetables each day.      Eat whole-grain bread, whole-wheat pasta and brown rice instead of white grains and rice.      Get adequate about Calcium and Vitamin D.     Lifestyle    Exercise at least 150 minutes a week (30 minutes a day, 5 days a week). This will help you control your weight and prevent disease.      Limit alcohol to one drink per day.      No smoking.       Wear sunscreen to prevent skin cancer.       See your dentist twice a year for an exam and cleaning.      See your eye doctor every 1 to 2 years to screen for  conditions such as glaucoma, macular degeneration, cataracts, etc.    Personalized Prevention Plan  You are due for the preventive services outlined below.  Your care team is available to assist you in scheduling these services.  If you have already completed any of these items, please share that information with your care team to update in your medical record.    Health Maintenance Due   Topic Date Due     COPD Action Plan  07/31/1931     Zoster (Shingles) Vaccine (1 of 2) 07/31/1981     Flu Vaccine (1) 09/01/2020     Discuss Advance Care Planning  09/10/2020     Eye Exam  09/12/2020     Annual Wellness Visit  10/18/2020     Basic Metabolic Panel  10/18/2020     Kidney Microalbumin Urine Test  10/18/2020     Patient Education     Constipation (Adult)  Constipation means that you have bowel movements that are less frequent than usual. Stools often become very hard and difficult to pass.  Constipation is very common. At some point in life, it affects almost everyone. Since everyone's bowel habits are different, what is constipation to one person may not be to another. Your healthcare provider may do tests to diagnose constipation. It depends on what he or she finds when evaluating you.    Symptoms of constipation include:    Abdominal pain    Bloating    Vomiting    Painful bowel movements    Itching, swelling, bleeding, or pain around the anus  Causes  Constipation can have many causes. These include:    Diet low in fiber    Too much dairy    Not drinking enough liquids    Lack of exercise or physical activity (especially true for older adults)    Changes in lifestyle or daily routine, including pregnancy, aging, work, and travel    Frequent use or misuse of laxatives    Ignoring the urge to have a bowel movement or delaying it until later    Medicines, such as certain prescription pain medicines, iron supplements, antacids, certain antidepressants, and calcium supplements    Diseases like irritable bowel syndrome,  bowel obstructions, stroke, diabetes, thyroid disease, Parkinson disease, hemorrhoids, and colon cancer  Complications  Potential complications of constipation can include:    Hemorrhoids    Rectal bleeding from hemorrhoids or anal fissures (skin tears)    Hernias    Dependency on laxatives    Chronic constipation    Fecal impaction, a severe form of constipation in which a large amount of hard stool is in your rectum that you can't pass    Bowel obstruction or perforation  Home care  All treatment should be done after talking with your healthcare provider. This is especially true if you have another medical problems, are taking prescription medicines, or are an older adult. Treatment most often involves lifestyle changes. You may also need medicines. Your healthcare provider will tell you which will work best for you. Follow the advice below to help avoid this problem in the future.  Lifestyle changes  These lifestyle changes can help prevent constipation:    Diet. Eat a high-fiber diet, with fresh fruit and vegetables, and reduce dairy intake, meats, and processed foods    Fluids. It's important to get enough fluids each day. Drink plenty of water when you eat more fiber. If you are on diet that limits the amount of fluid you can have, talk about this with your healthcare provider.    Regular exercise. Check with your healthcare provider first.  Medicines  Take any medicines as directed. Some laxatives are safe to use only every now and then. Others can be taken on a regular basis. While laxatives don't cause bowel dependence, they are treating the symptoms. So your constipation may return if you don't make other changes. Talk with your healthcare provider or pharmacist if you have questions.  Prescription pain medicines can cause constipation. If you are taking this kind of medicine, ask your healthcare provider if you should also take a stool softener.  Medicines you may take to treat constipation  include:    Fiber supplements    Stool softeners    Laxatives    Enemas    Rectal suppositories  Follow-up care  Follow up with your healthcare provider if symptoms don't get better in the next few days. You may need to have more tests or see a specialist.  Call 911  Call 911 if any of these occur:    Trouble breathing    Stiff, rigid abdomen that is severely painful to touch    Confusion    Fainting or loss of consciousness    Rapid heart rate    Chest pain  When to seek medical advice  Call your healthcare provider right away if any of these occur:    Fever of 100.4 F (38 C) or higher, or as directed by your healthcare provider    Failure to resume normal bowel movements    Pain in your abdomen or back gets worse    Nausea or vomiting    Swelling in your abdomen    Blood in the stool    Black, tarry stool    Involuntary weight loss    Weakness  Date Last Reviewed: 6/1/2018 2000-2019 [employer]. [address] [place] [address]. All rights reserved. This information is not intended as a substitute for professional medical care. Always follow your healthcare professional's instructions.

## 2020-10-15 NOTE — PROGRESS NOTES
"    SUBJECTIVE:   Diann John is a 89 year old female who presents for Preventive Visit.      Patient has been advised of split billing requirements and indicates understanding: No  Are you in the first 12 months of your Medicare Part B coverage?  No    Physical Health:    In general, how would you rate your overall physical health? good    Outside of work, how many days during the week do you exercise? none    Outside of work, approximately how many minutes a day do you exercise?not applicable    If you drink alcohol do you typically have >3 drinks per day or >7 drinks per week? No    Do you usually eat at least 4 servings of fruit and vegetables a day, include whole grains & fiber and avoid regularly eating high fat or \"junk\" foods? NO 1-2    Do you have any problems taking medications regularly?  No    Do you have any side effects from medications? Possible problem with Bp meds, after a couple hours of taking pill pt will get acid refulx     Needs assistance for the following daily activities: telephone use and transportation Daughter helps her.     Which of the following safety concerns are present in your home?  none identified     Hearing impairment: Yes, patient has partial hearing loss     In the past 6 months, have you been bothered by leaking of urine? Yes- sometimes if pt coughs     Mental Health:    In general, how would you rate your overall mental or emotional health? good  PHQ-2 Score:      Do you feel safe in your environment? Yes    Have you ever done Advance Care Planning? (For example, a Health Directive, POLST, or a discussion with a medical provider or your loved ones about your wishes): Yes, advance care planning is on file.    Additional concerns to address?  No    Fall risk:  Fallen 2 or more times in the past year?: No  Any fall with injury in the past year?: No    Cognitive Screenin) Repeat 3 items (Leader, Season, Table)    2) Clock draw: NORMAL  3) 3 item recall: Recalls 3 " objects  Results: 3 items recalled: COGNITIVE IMPAIRMENT LESS LIKELY    Mini-CogTM Copyright ROCHELLE Solorzano. Licensed by the author for use in [facility]; reprinted with permission (kelly@[facility]). All rights reserved.      Do you have sleep apnea, excessive snoring or daytime drowsiness?: no        Hyperlipidemia Follow-Up      Are you regularly taking any medication or supplement to lower your cholesterol?   Yes- atorvastatin    Are you having muscle aches or other side effects that you think could be caused by your cholesterol lowering medication?  No    Hypertension Follow-up      Do you check your blood pressure regularly outside of the clinic? Yes     Are you following a low salt diet? Yes    Are your blood pressures ever more than 140 on the top number (systolic) OR more   than 90 on the bottom number (diastolic), for example 140/90? No   Some discomfort with swallowing her blood pressure pill.     Vascular Disease Follow-up      How often do you take nitroglycerin? Never    Do you take an aspirin every day? Yes      Reviewed and updated as needed this visit by clinical staff  Tobacco  Allergies  Meds  Problems             Reviewed and updated as needed this visit by Provider    Meds  Problems            Social History     Tobacco Use     Smoking status: Former Smoker     Quit date: 1970     Years since quittin.8     Smokeless tobacco: Never Used   Substance Use Topics     Alcohol use: No                           Current providers sharing in care for this patient include:   Patient Care Team:  Esmer Finley MD as PCP - General (Family Practice)  Esmer Finley MD as Assigned PCP    The following health maintenance items are reviewed in Epic and correct as of today:  Health Maintenance   Topic Date Due     COPD ACTION PLAN  1931     ZOSTER IMMUNIZATION (1 of 2) 1981     INFLUENZA VACCINE (1) 2020     EYE EXAM  2020     BMP  10/18/2020     MICROALBUMIN   10/18/2020     FALL RISK ASSESSMENT  2021     MEDICARE ANNUAL WELLNESS VISIT  10/15/2021     DTAP/TDAP/TD IMMUNIZATION (3 - Td) 2025     ADVANCE CARE PLANNING  10/15/2025     SPIROMETRY  Completed     PHQ-2  Completed     Pneumococcal Vaccine: 65+ Years  Completed     Pneumococcal Vaccine: Pediatrics (0 to 5 Years) and At-Risk Patients (6 to 64 Years)  Aged Out     IPV IMMUNIZATION  Aged Out     MENINGITIS IMMUNIZATION  Aged Out     HEPATITIS B IMMUNIZATION  Aged Out     Lab work is in process  Labs reviewed in EPIC  BP Readings from Last 3 Encounters:   10/15/20 (!) 152/70   06/10/20 138/85   20 (!) 158/78    Wt Readings from Last 3 Encounters:   10/15/20 48.5 kg (107 lb)   20 50.3 kg (111 lb)   20 49.4 kg (109 lb)                  Patient Active Problem List   Diagnosis     Macular pucker, right eye     Pseudophakia OU     Blepharitis     Hypertension goal BP (blood pressure) < 140/90     Seasonal allergies     Carotid artery stenosis, asymptomatic     PVD (posterior vitreous detachment) OU     Advanced directives, counseling/discussion     ASVD (arteriosclerotic vascular disease)     Hyperlipidemia LDL goal <70     Chronic rhinitis     Pulmonary nodules     Pulmonary emphysema, unspecified emphysema type (H)     Family history of colon cancer     Protein-calorie malnutrition (H)     E. coli UTI (urinary tract infection), recurrent     Past Surgical History:   Procedure Laterality Date     APPENDECTOMY       C RAD RESEC TONSIL/PILLARS      Adeniods     CATARACT IOL, RT/LT Right      COLONOSCOPY WITH CO2 INSUFFLATION N/A 2019    Procedure: COLONOSCOPY, WITH CO2 INSUFFLATION;  Surgeon: Faizan Morton MD;  Location:  OR     HAND SURGERY       NOSE SURGERY         Social History     Tobacco Use     Smoking status: Former Smoker     Quit date: 1970     Years since quittin.8     Smokeless tobacco: Never Used   Substance Use Topics     Alcohol use: No     Family  History   Problem Relation Age of Onset     Cancer Father      Glaucoma Father      Cancer Mother      Hypertension Mother      Hypertension Sister      Cerebrovascular Disease Sister      Thyroid Disease No family hx of      Macular Degeneration No family hx of          Current Outpatient Medications   Medication Sig Dispense Refill     albuterol (PROAIR HFA/PROVENTIL HFA/VENTOLIN HFA) 108 (90 Base) MCG/ACT inhaler Inhale 2 puffs into the lungs every 4 hours as needed for shortness of breath / dyspnea or wheezing 1 Inhaler 3     amLODIPine (NORVASC) 2.5 MG tablet Take 1 tablet by mouth once daily 90 tablet 0     ascorbic acid (VITAMIN C) 1000 MG TABS Take 1,000 mg by mouth daily       aspirin 81 MG tablet Take 1 tablet (81 mg) by mouth daily 30 tablet      atorvastatin (LIPITOR) 20 MG tablet Take 1 tablet by mouth once daily 90 tablet 0     Calcium-Vitamin D (CALCIUM + D PO) Take 1 tablet by mouth daily.       erythromycin (ROMYCIN) 5 MG/GM ophthalmic ointment Place 0.5 inches into both eyes daily Apply small (1/4 inch) strip to affected eye(s) 1 Tube 11     fluticasone (FLONASE) 50 MCG/ACT spray Spray 1-2 sprays into both nostrils daily 1 Bottle 11     omega-3 fatty acids (FISH OIL) 1200 MG capsule Take 1 capsule by mouth daily       umeclidinium (INCRUSE ELLIPTA) 62.5 MCG/INH inhaler Inhale 1 puff into the lungs daily 1 Inhaler 11     Allergies   Allergen Reactions     Bactrim [Sulfamethoxazole W/Trimethoprim] Fatigue     Codeine Other (See Comments)     Heaaches     Lisinopril Cough     Milk Protein Extract Cough     And phlegm        Recent Labs   Lab Test 10/18/19  1220 10/25/18  1118 10/03/17  0728 03/07/16  1555 03/07/16  1555 08/15/13  0859 08/15/13  0859 10/19/12   LDL 64 57 43   < >  --    < > 123  --    HDL 71 61 68   < >  --    < > 59  --    TRIG 46 54 58   < >  --    < > 99  --    ALT  --   --   --   --  33  --  32  --    CR 0.64 0.71 0.78   < > 0.83   < > 0.76  --    GFRESTIMATED 79 78 70   < > 65   <  "> 73  --    GFRESTBLACK >90 >90 85   < > 79   < > 88  --    POTASSIUM 3.9 3.9 4.0   < > 3.7   < > 3.8  --    TSH  --   --   --   --   --   --  3.53 2.18    < > = values in this interval not displayed.      Pneumonia Vaccine:Adults age 65+ who received Pneumovax (PPSV23) at 65 years or older: Should be given PCV13 > 1 year after their most recent PPSV23  Mammogram Screening: Mammo discussed, not appropriate for or declined by this patient.    ROS:  Constitutional, HEENT, cardiovascular, pulmonary, gi and gu systems are negative, except as otherwise noted.    OBJECTIVE:   BP (!) 152/70 (BP Location: Left arm, Patient Position: Chair, Cuff Size: Adult Regular)   Pulse 82   Temp 97.8  F (36.6  C) (Oral)   Wt 48.5 kg (107 lb)   LMP 05/10/1978 (Approximate)   SpO2 97%   BMI 17.81 kg/m   Estimated body mass index is 17.81 kg/m  as calculated from the following:    Height as of 3/5/20: 1.651 m (5' 5\").    Weight as of this encounter: 48.5 kg (107 lb).  EXAM:   GENERAL: elderly, alert, well nourished, well hydrated, no distress  HENT: ear canals- normal; TMs- normal; Nose- normal; Mouth- no ulcers, no lesions, missing dentition  NECK: no tenderness, no adenopathy, no asymmetry, no masses, no stiffness; thyroid- normal to palpation  RESP: lungs clear to auscultation - no rales, no rhonchi, no wheezes  CV: regular rates and rhythm, normal S1 S2, no S3 or S4 and no murmur, no click or rub, normal pulses  ABDOMEN: soft, no tenderness, no  hepatosplenomegaly, no masses, normal bowel sounds  MS: extremities- no gross deformities noted, no edema  SKIN: no suspicious lesions, no rashes, age related skin changes with seborrheic keratosis and no actinic keratosis.    NEURO: strength and tone- normal, sensory exam- grossly normal, reflexes- symmetric  BACK: no CVA tenderness, no paralumbar tenderness  MENTAL STATUS EXAM:  Appearance/Behavior: no apparent distress, neatly groomed, dressed appropriately for weather, appears stated " age and is not frail-appearing  Speech: normal  Mood/Affect: normal affect  Insight: Good     Diagnostic Test Results:  Labs reviewed in Epic  Results for orders placed or performed in visit on 10/15/20 (from the past 24 hour(s))   CBC with platelets   Result Value Ref Range    WBC 4.6 4.0 - 11.0 10e9/L    RBC Count 4.35 3.8 - 5.2 10e12/L    Hemoglobin 13.5 11.7 - 15.7 g/dL    Hematocrit 40.8 35.0 - 47.0 %    MCV 94 78 - 100 fl    MCH 31.0 26.5 - 33.0 pg    MCHC 33.1 31.5 - 36.5 g/dL    RDW 11.7 10.0 - 15.0 %    Platelet Count 196 150 - 450 10e9/L   Renal panel   Result Value Ref Range    Sodium 136 133 - 144 mmol/L    Potassium 4.2 3.4 - 5.3 mmol/L    Chloride 104 94 - 109 mmol/L    Carbon Dioxide 26 20 - 32 mmol/L    Anion Gap 6 3 - 14 mmol/L    Glucose 83 70 - 99 mg/dL    Urea Nitrogen 16 7 - 30 mg/dL    Creatinine 0.75 0.52 - 1.04 mg/dL    GFR Estimate 70 >60 mL/min/[1.73_m2]    GFR Estimate If Black 82 >60 mL/min/[1.73_m2]    Calcium 8.9 8.5 - 10.1 mg/dL    Phosphorus 3.6 2.5 - 4.5 mg/dL    Albumin 4.0 3.4 - 5.0 g/dL   Lipid panel reflex to direct LDL Fasting   Result Value Ref Range    Cholesterol 147 <200 mg/dL    Triglycerides 68 <150 mg/dL    HDL Cholesterol 73 >49 mg/dL    LDL Cholesterol Calculated 60 <100 mg/dL    Non HDL Cholesterol 74 <130 mg/dL   ALT   Result Value Ref Range    ALT 37 0 - 50 U/L   UA reflex to Microscopic and Culture    Specimen: Midstream Urine   Result Value Ref Range    Color Urine Yellow     Appearance Urine Clear     Glucose Urine Negative NEG^Negative mg/dL    Bilirubin Urine Negative NEG^Negative    Ketones Urine Negative NEG^Negative mg/dL    Specific Gravity Urine 1.015 1.003 - 1.035    Blood Urine Trace (A) NEG^Negative    pH Urine 7.0 5.0 - 7.0 pH    Protein Albumin Urine Negative NEG^Negative mg/dL    Urobilinogen Urine 0.2 0.2 - 1.0 EU/dL    Nitrite Urine Negative NEG^Negative    Leukocyte Esterase Urine Negative NEG^Negative    Source Midstream Urine    Urine Microscopic  "  Result Value Ref Range    WBC Urine 0 - 5 OTO5^0 - 5 /HPF    RBC Urine O - 2 OTO2^O - 2 /HPF    Squamous Epithelial /LPF Urine Few FEW^Few /LPF    Bacteria Urine Few (A) NEG^Negative /HPF       ASSESSMENT / PLAN:       ICD-10-CM    1. Routine general medical examination at a health care facility  Z00.00    2. Mild protein-calorie malnutrition (H)  E44.1 Weight stable for past 2 years. Appetite poor   3. Hypertension goal BP (blood pressure) < 140/90  I10 CBC with platelets     Renal panel   4. Hyperlipidemia LDL goal <70  E78.5 Lipid panel reflex to direct LDL Fasting     ALT   5. ASVD (arteriosclerotic vascular disease)  I70.90 Stable without current angina   6. Asymptomatic stenosis of left carotid artery  I65.22 No symptom of stroke or dizziness.    7. Slow transit constipation - discussed use of stool softener and senna if needed.  K59.01 polyethylene glycol (MIRALAX) 17 GM/Dose powder     SENNA-docusate sodium (SENNA S) 8.6-50 MG tablet   8. Recurrent UTI - recheck N39.0 UA reflex to Microscopic and Culture     Urine Microscopic       Patient has been advised of split billing requirements and indicates understanding: No    COUNSELING:  Reviewed preventive health counseling, as reflected in patient instructions       Regular exercise       Healthy diet/nutrition       Dental care       Fall risk prevention    Estimated body mass index is 17.81 kg/m  as calculated from the following:    Height as of 3/5/20: 1.651 m (5' 5\").    Weight as of this encounter: 48.5 kg (107 lb).    Weight management plan noted, stable and monitoring    She reports that she quit smoking about 50 years ago. She has never used smokeless tobacco.    Appropriate preventive services were discussed with this patient, including applicable screening as appropriate for cardiovascular disease, diabetes, osteopenia/osteoporosis, and glaucoma.  As appropriate for age/gender, discussed screening for colorectal cancer, prostate cancer, breast " cancer, and cervical cancer. Checklist reviewing preventive services available has been given to the patient.    Reviewed patients plan of care and provided an AVS. The Basic Care Plan (routine screening as documented in Health Maintenance) for Diann meets the Care Plan requirement. This Care Plan has been established and reviewed with the Patient.    Counseling Resources:  ATP IV Guidelines  Pooled Cohorts Equation Calculator  Breast Cancer Risk Calculator  BRCA-Related Cancer Risk Assessment: FHS-7 Tool  FRAX Risk Assessment  ICSI Preventive Guidelines  Dietary Guidelines for Americans, 2010  USDA's MyPlate  ASA Prophylaxis  Lung CA Screening    Esmer Finley MD  [facility]

## 2020-10-16 ENCOUNTER — OFFICE VISIT (OUTPATIENT)
Dept: OPHTHALMOLOGY | Facility: CLINIC | Age: 85
End: 2020-10-16
Payer: COMMERCIAL

## 2020-10-16 DIAGNOSIS — Z01.00 EXAMINATION OF EYES AND VISION: Primary | ICD-10-CM

## 2020-10-16 DIAGNOSIS — H02.883 MEIBOMIAN GLAND DYSFUNCTION (MGD) OF BOTH EYES: ICD-10-CM

## 2020-10-16 DIAGNOSIS — H01.02A SQUAMOUS BLEPHARITIS OF UPPER AND LOWER EYELIDS OF BOTH EYES: ICD-10-CM

## 2020-10-16 DIAGNOSIS — D23.10 PAPILLOMA OF EYELID, LEFT: ICD-10-CM

## 2020-10-16 DIAGNOSIS — H52.4 PRESBYOPIA: ICD-10-CM

## 2020-10-16 DIAGNOSIS — H02.886 MEIBOMIAN GLAND DYSFUNCTION (MGD) OF BOTH EYES: ICD-10-CM

## 2020-10-16 DIAGNOSIS — H35.371 MACULAR PUCKER, RIGHT EYE: ICD-10-CM

## 2020-10-16 DIAGNOSIS — H52.13 MYOPIA OF BOTH EYES: ICD-10-CM

## 2020-10-16 DIAGNOSIS — H01.02B SQUAMOUS BLEPHARITIS OF UPPER AND LOWER EYELIDS OF BOTH EYES: ICD-10-CM

## 2020-10-16 DIAGNOSIS — H43.813 PVD (POSTERIOR VITREOUS DETACHMENT), BILATERAL: ICD-10-CM

## 2020-10-16 DIAGNOSIS — Z96.1 PSEUDOPHAKIA: ICD-10-CM

## 2020-10-16 DIAGNOSIS — H52.223 REGULAR ASTIGMATISM OF BOTH EYES: ICD-10-CM

## 2020-10-16 PROCEDURE — 92015 DETERMINE REFRACTIVE STATE: CPT | Performed by: STUDENT IN AN ORGANIZED HEALTH CARE EDUCATION/TRAINING PROGRAM

## 2020-10-16 PROCEDURE — 92014 COMPRE OPH EXAM EST PT 1/>: CPT | Performed by: STUDENT IN AN ORGANIZED HEALTH CARE EDUCATION/TRAINING PROGRAM

## 2020-10-16 RX ORDER — ERYTHROMYCIN 5 MG/G
0.5 OINTMENT OPHTHALMIC DAILY
Qty: 1 TUBE | Refills: 11 | Status: SHIPPED | OUTPATIENT
Start: 2020-10-16 | End: 2021-01-01

## 2020-10-16 ASSESSMENT — SLIT LAMP EXAM - LIDS: COMMENTS: TR BLEPHARITIS, 2+ MEIBOMIAN GLAND DYSFUNCTION

## 2020-10-16 ASSESSMENT — VISUAL ACUITY
OS_CC: 20/25
OD_CC+: -3
OS_CC+: -3
METHOD: SNELLEN - LINEAR
OD_CC: 20/25
CORRECTION_TYPE: GLASSES

## 2020-10-16 ASSESSMENT — REFRACTION_WEARINGRX
OS_AXIS: 174
OS_SPHERE: -3.00
OD_CYLINDER: +3.00
SPECS_TYPE: SVL
OS_CYLINDER: +3.25
OD_SPHERE: -1.00
OD_AXIS: 010

## 2020-10-16 ASSESSMENT — CONF VISUAL FIELD
OD_NORMAL: 1
OS_NORMAL: 1

## 2020-10-16 ASSESSMENT — REFRACTION_MANIFEST
OD_SPHERE: -1.25
OD_CYLINDER: +3.25
OS_SPHERE: -3.00
OS_CYLINDER: +3.50
OD_ADD: +3.00
OS_AXIS: 170
OD_AXIS: 013
OS_ADD: +3.00

## 2020-10-16 ASSESSMENT — TONOMETRY
IOP_METHOD: APPLANATION
OD_IOP_MMHG: 19
OS_IOP_MMHG: 17

## 2020-10-16 ASSESSMENT — CUP TO DISC RATIO
OD_RATIO: 0.7
OS_RATIO: 0.7

## 2020-10-16 ASSESSMENT — EXTERNAL EXAM - LEFT EYE: OS_EXAM: NORMAL

## 2020-10-16 ASSESSMENT — EXTERNAL EXAM - RIGHT EYE: OD_EXAM: NORMAL

## 2020-10-16 NOTE — PATIENT INSTRUCTIONS
Continue erythromycin ointment at bedtime in both eyes     Continue cleaning the eyelids with baby shampoo daily    Glasses prescription given - optional to update    Lisa Page MD  [phone]

## 2020-10-16 NOTE — LETTER
10/16/2020         RE: Diann John  [address] [id number]        Dear Colleague,    Thank you for referring your patient, Diann John, to the [facility]. Please see a copy of my visit note below.     Current Eye Medications:  Baby shampoo scrubs daily, Erythromycin at bedtime both eyes     Subjective:  Here for complete today. Eyes always water. When it gets colder they get worse. Has a bump on the left upper eyelid, wants to know if she should have it removed. If it's going to get bigger wants to have it removed.      Objective:  See Ophthalmology Exam.       Assessment:  Diann John is a 89 year old female who presents with:   Encounter Diagnoses   Name Primary?     Examination of eyes and vision      Myopia of both eyes      Regular astigmatism of both eyes      Presbyopia        Pseudophakia OU      Macular pucker, right eye      PVD (posterior vitreous detachment), bilateral      Meibomian gland dysfunction (MGD) of both eyes      Squamous blepharitis of upper and lower eyelids of both eyes      Papilloma of eyelid, left Small. Discussed monitoring for now and if it gets bigger, then removing in office.       Plan:  Continue erythromycin ointment at bedtime in both eyes     Continue cleaning the eyelids with baby shampoo daily    Continue warm compresses and artificial tears up to four times a day as needed     Glasses prescription given - optional to update    Lisa Page MD  [phone]          Again, thank you for allowing me to participate in the care of your patient.        Sincerely,        Lisa Page MD

## 2020-10-17 NOTE — RESULT ENCOUNTER NOTE
Dear Diann John,    Your test results are attached. I am happy to let you know that they are stable.    The blood sugar is normal and you do not have diabetes. The kidneys are healthy. Your cholesterol looks great also. The urine does not show any signs of infection. We can recheck labs in 1 year.     Please call me if you have any questions about these test results or about your care.    Sincerely,    Esmer Finley MD

## 2020-10-21 ENCOUNTER — TELEPHONE (OUTPATIENT)
Dept: FAMILY MEDICINE | Facility: CLINIC | Age: 85
End: 2020-10-21

## 2020-10-21 DIAGNOSIS — R91.8 PULMONARY NODULES: Primary | ICD-10-CM

## 2020-10-21 NOTE — TELEPHONE ENCOUNTER
.Reason for call:  Other   Patient called regarding (reason for call): call back  Additional comments: pt is calling looking for a message from a provider that she was supposed to get at the pharmacy. Pt also said that Dr. Finley was supposed to call the [facility] to set up some sort of test. Please follow up with provider and give pt a call.    Phone number to reach patient:  Home number on file [phone] (home)    Best Time:  anytime    Can we leave a detailed message on this number?  YES    Travel screening: Negative

## 2020-11-02 NOTE — TELEPHONE ENCOUNTER
I think that the test was a CT scan of the lungs and I put in this order for her.     The other question was about easier pills to swallow for her blood pressure medication. She needs to ask her pharmacist if there are other options available for this that might work.    Esmer Finley MD

## 2020-11-03 ENCOUNTER — TELEPHONE (OUTPATIENT)
Dept: FAMILY MEDICINE | Facility: CLINIC | Age: 85
End: 2020-11-03

## 2020-11-03 DIAGNOSIS — I10 ESSENTIAL HYPERTENSION WITH GOAL BLOOD PRESSURE LESS THAN 140/90: ICD-10-CM

## 2020-11-03 DIAGNOSIS — I65.22 ASYMPTOMATIC STENOSIS OF LEFT CAROTID ARTERY: Primary | ICD-10-CM

## 2020-11-03 DIAGNOSIS — R06.00 DYSPNEA, UNSPECIFIED TYPE: ICD-10-CM

## 2020-11-03 DIAGNOSIS — I65.22 CAROTID ARTERY STENOSIS, ASYMPTOMATIC, LEFT: ICD-10-CM

## 2020-11-03 DIAGNOSIS — J43.2 CENTRILOBULAR EMPHYSEMA (H): ICD-10-CM

## 2020-11-03 NOTE — TELEPHONE ENCOUNTER
Patient contacted and informed.    She states the problem with swallowing pills comes and goes and currently is not a problem so she will not do anything at this time.    Yash Villalobos CMA

## 2020-11-04 NOTE — TELEPHONE ENCOUNTER
Pt updated via VM that both exams were ordered and left # to call to schedule    LUIS ALBERTO Mayberry

## 2020-11-04 NOTE — TELEPHONE ENCOUNTER
Order for carotid us done.     Please schedule your DEXA scan or ultrasound by calling [phone].     Esmer Finley MD

## 2020-11-06 RX ORDER — AMLODIPINE BESYLATE 2.5 MG/1
2.5 TABLET ORAL DAILY
Qty: 90 TABLET | Refills: 3 | Status: SHIPPED | OUTPATIENT
Start: 2020-11-06 | End: 2021-01-01

## 2020-11-06 RX ORDER — ATORVASTATIN CALCIUM 20 MG/1
20 TABLET, FILM COATED ORAL DAILY
Qty: 90 TABLET | Refills: 3 | Status: SHIPPED | OUTPATIENT
Start: 2020-11-06 | End: 2021-01-01

## 2020-11-06 NOTE — TELEPHONE ENCOUNTER
Prescription approved per [facility] Refill Protocol. Incruse and Atorvastatin   Routing refill request to provider for review/approval because:  Blood pressure fails protocol -Amlodipine   Layne Esquivel RN  [facility]

## 2020-11-10 ENCOUNTER — ANCILLARY PROCEDURE (OUTPATIENT)
Dept: ULTRASOUND IMAGING | Facility: CLINIC | Age: 85
End: 2020-11-10
Attending: FAMILY MEDICINE
Payer: COMMERCIAL

## 2020-11-10 DIAGNOSIS — I65.22 ASYMPTOMATIC STENOSIS OF LEFT CAROTID ARTERY: ICD-10-CM

## 2020-11-10 PROCEDURE — 93880 EXTRACRANIAL BILAT STUDY: CPT | Performed by: RADIOLOGY

## 2020-11-11 NOTE — RESULT ENCOUNTER NOTE
Dear Diann John,    Your test results are attached. I am happy to let you know that they are stable.    The narrowing on your left carotid has not changed since 2016. The right side looks good.     Please call me if you have any questions about these test results or about your care.    Sincerely,    Esmer Finley MD

## 2020-11-12 ENCOUNTER — ANCILLARY PROCEDURE (OUTPATIENT)
Dept: CT IMAGING | Facility: CLINIC | Age: 85
End: 2020-11-12
Attending: FAMILY MEDICINE
Payer: COMMERCIAL

## 2020-11-12 DIAGNOSIS — R91.8 PULMONARY NODULES: ICD-10-CM

## 2020-11-12 PROCEDURE — 71260 CT THORAX DX C+: CPT | Mod: GC | Performed by: RADIOLOGY

## 2020-11-12 RX ORDER — IOPAMIDOL 755 MG/ML
53 INJECTION, SOLUTION INTRAVASCULAR ONCE
Status: COMPLETED | OUTPATIENT
Start: 2020-11-12 | End: 2020-11-12

## 2020-11-12 RX ADMIN — IOPAMIDOL 53 ML: 755 INJECTION, SOLUTION INTRAVASCULAR at 12:57

## 2020-11-13 NOTE — RESULT ENCOUNTER NOTE
Dear Diann John,    Your test results are attached. I am happy to let you know that they are stable.    The CT scan of your lungs is unchanged. You have several tiny nodules that are staying the same. This is very reassuring.     Please call me if you have any questions about these test results or about your care.    Sincerely,    Esmer Finley MD

## 2021-01-01 ENCOUNTER — OFFICE VISIT (OUTPATIENT)
Dept: FAMILY MEDICINE | Facility: CLINIC | Age: 86
End: 2021-01-01
Payer: COMMERCIAL

## 2021-01-01 ENCOUNTER — NURSE TRIAGE (OUTPATIENT)
Dept: NURSING | Facility: CLINIC | Age: 86
End: 2021-01-01

## 2021-01-01 ENCOUNTER — TELEPHONE (OUTPATIENT)
Dept: NURSING | Facility: CLINIC | Age: 86
End: 2021-01-01

## 2021-01-01 ENCOUNTER — VIRTUAL VISIT (OUTPATIENT)
Dept: FAMILY MEDICINE | Facility: CLINIC | Age: 86
End: 2021-01-01
Payer: COMMERCIAL

## 2021-01-01 ENCOUNTER — ANCILLARY PROCEDURE (OUTPATIENT)
Dept: CT IMAGING | Facility: CLINIC | Age: 86
End: 2021-01-01
Attending: FAMILY MEDICINE
Payer: COMMERCIAL

## 2021-01-01 ENCOUNTER — PATIENT OUTREACH (OUTPATIENT)
Dept: CARE COORDINATION | Facility: CLINIC | Age: 86
End: 2021-01-01

## 2021-01-01 ENCOUNTER — PATIENT OUTREACH (OUTPATIENT)
Dept: NURSING | Facility: CLINIC | Age: 86
End: 2021-01-01
Payer: COMMERCIAL

## 2021-01-01 ENCOUNTER — NURSE TRIAGE (OUTPATIENT)
Dept: FAMILY MEDICINE | Facility: CLINIC | Age: 86
End: 2021-01-01

## 2021-01-01 ENCOUNTER — LAB (OUTPATIENT)
Dept: URGENT CARE | Facility: URGENT CARE | Age: 86
End: 2021-01-01
Attending: INTERNAL MEDICINE
Payer: COMMERCIAL

## 2021-01-01 ENCOUNTER — OFFICE VISIT (OUTPATIENT)
Dept: OPHTHALMOLOGY | Facility: CLINIC | Age: 86
End: 2021-01-01
Payer: COMMERCIAL

## 2021-01-01 ENCOUNTER — OFFICE VISIT (OUTPATIENT)
Dept: URGENT CARE | Facility: URGENT CARE | Age: 86
End: 2021-01-01
Payer: COMMERCIAL

## 2021-01-01 ENCOUNTER — NURSE TRIAGE (OUTPATIENT)
Dept: NURSING | Facility: CLINIC | Age: 86
End: 2021-01-01
Payer: COMMERCIAL

## 2021-01-01 ENCOUNTER — HEALTH MAINTENANCE LETTER (OUTPATIENT)
Age: 86
End: 2021-01-01

## 2021-01-01 ENCOUNTER — ANCILLARY PROCEDURE (OUTPATIENT)
Dept: ULTRASOUND IMAGING | Facility: CLINIC | Age: 86
End: 2021-01-01
Attending: FAMILY MEDICINE
Payer: COMMERCIAL

## 2021-01-01 ENCOUNTER — TELEPHONE (OUTPATIENT)
Dept: FAMILY MEDICINE | Facility: CLINIC | Age: 86
End: 2021-01-01

## 2021-01-01 VITALS
SYSTOLIC BLOOD PRESSURE: 148 MMHG | WEIGHT: 108 LBS | HEART RATE: 77 BPM | HEIGHT: 65 IN | OXYGEN SATURATION: 100 % | DIASTOLIC BLOOD PRESSURE: 72 MMHG | BODY MASS INDEX: 17.99 KG/M2 | TEMPERATURE: 98.5 F

## 2021-01-01 VITALS
TEMPERATURE: 99 F | OXYGEN SATURATION: 96 % | BODY MASS INDEX: 19.99 KG/M2 | WEIGHT: 103.2 LBS | DIASTOLIC BLOOD PRESSURE: 82 MMHG | SYSTOLIC BLOOD PRESSURE: 170 MMHG | HEART RATE: 72 BPM

## 2021-01-01 VITALS
HEART RATE: 84 BPM | HEIGHT: 60 IN | SYSTOLIC BLOOD PRESSURE: 160 MMHG | BODY MASS INDEX: 20.62 KG/M2 | WEIGHT: 105 LBS | DIASTOLIC BLOOD PRESSURE: 62 MMHG | OXYGEN SATURATION: 98 %

## 2021-01-01 VITALS
WEIGHT: 97.6 LBS | OXYGEN SATURATION: 100 % | DIASTOLIC BLOOD PRESSURE: 79 MMHG | BODY MASS INDEX: 19.16 KG/M2 | HEART RATE: 77 BPM | SYSTOLIC BLOOD PRESSURE: 166 MMHG | HEIGHT: 60 IN

## 2021-01-01 VITALS
DIASTOLIC BLOOD PRESSURE: 78 MMHG | TEMPERATURE: 97.7 F | OXYGEN SATURATION: 100 % | WEIGHT: 103.4 LBS | SYSTOLIC BLOOD PRESSURE: 186 MMHG | HEART RATE: 77 BPM | RESPIRATION RATE: 18 BRPM | BODY MASS INDEX: 17.21 KG/M2

## 2021-01-01 VITALS — BODY MASS INDEX: 20.58 KG/M2 | WEIGHT: 109 LBS | HEIGHT: 61 IN

## 2021-01-01 DIAGNOSIS — Z01.812 ENCOUNTER FOR SCREENING LABORATORY TESTING FOR COVID-19 VIRUS IN ASYMPTOMATIC PATIENT: Primary | ICD-10-CM

## 2021-01-01 DIAGNOSIS — H35.371 MACULAR PUCKER, RIGHT EYE: ICD-10-CM

## 2021-01-01 DIAGNOSIS — Z96.1 PSEUDOPHAKIA: ICD-10-CM

## 2021-01-01 DIAGNOSIS — H01.02B SQUAMOUS BLEPHARITIS OF UPPER AND LOWER EYELIDS OF BOTH EYES: ICD-10-CM

## 2021-01-01 DIAGNOSIS — H01.02A SQUAMOUS BLEPHARITIS OF UPPER AND LOWER EYELIDS OF BOTH EYES: ICD-10-CM

## 2021-01-01 DIAGNOSIS — Z00.00 ENCOUNTER FOR MEDICARE ANNUAL WELLNESS EXAM: Primary | ICD-10-CM

## 2021-01-01 DIAGNOSIS — H52.4 PRESBYOPIA: ICD-10-CM

## 2021-01-01 DIAGNOSIS — Z01.812 ENCOUNTER FOR SCREENING LABORATORY TESTING FOR COVID-19 VIRUS IN ASYMPTOMATIC PATIENT: ICD-10-CM

## 2021-01-01 DIAGNOSIS — Z11.52 ENCOUNTER FOR SCREENING LABORATORY TESTING FOR COVID-19 VIRUS IN ASYMPTOMATIC PATIENT: ICD-10-CM

## 2021-01-01 DIAGNOSIS — E44.1 MILD PROTEIN-CALORIE MALNUTRITION (H): ICD-10-CM

## 2021-01-01 DIAGNOSIS — I70.90 ASVD (ARTERIOSCLEROTIC VASCULAR DISEASE): ICD-10-CM

## 2021-01-01 DIAGNOSIS — R51.9 ACUTE NONINTRACTABLE HEADACHE, UNSPECIFIED HEADACHE TYPE: Primary | ICD-10-CM

## 2021-01-01 DIAGNOSIS — H02.886 MEIBOMIAN GLAND DYSFUNCTION (MGD) OF BOTH EYES: ICD-10-CM

## 2021-01-01 DIAGNOSIS — K59.01 SLOW TRANSIT CONSTIPATION: ICD-10-CM

## 2021-01-01 DIAGNOSIS — H40.003 GLAUCOMA SUSPECT OF BOTH EYES: Primary | ICD-10-CM

## 2021-01-01 DIAGNOSIS — J44.9 CHRONIC OBSTRUCTIVE PULMONARY DISEASE, UNSPECIFIED COPD TYPE (H): ICD-10-CM

## 2021-01-01 DIAGNOSIS — R91.8 PULMONARY NODULES: Primary | ICD-10-CM

## 2021-01-01 DIAGNOSIS — R68.83 CHILLS: ICD-10-CM

## 2021-01-01 DIAGNOSIS — H53.8 BLURRED VISION: Primary | ICD-10-CM

## 2021-01-01 DIAGNOSIS — H02.883 MEIBOMIAN GLAND DYSFUNCTION (MGD) OF BOTH EYES: ICD-10-CM
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
## 2021-01-01 DIAGNOSIS — I65.22 CAROTID ARTERY STENOSIS, ASYMPTOMATIC, LEFT: ICD-10-CM

## 2021-01-01 DIAGNOSIS — J43.9 PULMONARY EMPHYSEMA, UNSPECIFIED EMPHYSEMA TYPE (H): ICD-10-CM

## 2021-01-01 DIAGNOSIS — Z23 ENCOUNTER FOR IMMUNIZATION: ICD-10-CM

## 2021-01-01 DIAGNOSIS — I10 HYPERTENSION GOAL BP (BLOOD PRESSURE) < 140/90: Primary | ICD-10-CM

## 2021-01-01 DIAGNOSIS — I10 HYPERTENSION GOAL BP (BLOOD PRESSURE) < 140/90: ICD-10-CM

## 2021-01-01 DIAGNOSIS — J43.2 CENTRILOBULAR EMPHYSEMA (H): ICD-10-CM

## 2021-01-01 DIAGNOSIS — H43.813 PVD (POSTERIOR VITREOUS DETACHMENT), BILATERAL: ICD-10-CM

## 2021-01-01 DIAGNOSIS — I65.22 ASYMPTOMATIC STENOSIS OF LEFT CAROTID ARTERY: ICD-10-CM

## 2021-01-01 DIAGNOSIS — E78.5 HYPERLIPIDEMIA LDL GOAL <70: ICD-10-CM

## 2021-01-01 DIAGNOSIS — Z11.52 ENCOUNTER FOR SCREENING LABORATORY TESTING FOR COVID-19 VIRUS IN ASYMPTOMATIC PATIENT: Primary | ICD-10-CM

## 2021-01-01 DIAGNOSIS — I10 ESSENTIAL HYPERTENSION WITH GOAL BLOOD PRESSURE LESS THAN 140/90: ICD-10-CM

## 2021-01-01 DIAGNOSIS — R06.00 DYSPNEA, UNSPECIFIED TYPE: ICD-10-CM

## 2021-01-01 DIAGNOSIS — R79.89 ELEVATED SERUM CREATININE: ICD-10-CM

## 2021-01-01 DIAGNOSIS — R91.8 PULMONARY NODULES: ICD-10-CM

## 2021-01-01 DIAGNOSIS — N39.0 E. COLI UTI (URINARY TRACT INFECTION): ICD-10-CM

## 2021-01-01 DIAGNOSIS — B96.20 E. COLI UTI (URINARY TRACT INFECTION): ICD-10-CM

## 2021-01-01 DIAGNOSIS — H81.10 BENIGN PAROXYSMAL POSITIONAL VERTIGO, UNSPECIFIED LATERALITY: ICD-10-CM

## 2021-01-01 DIAGNOSIS — H40.1132 PRIMARY OPEN ANGLE GLAUCOMA (POAG) OF BOTH EYES, MODERATE STAGE: Primary | ICD-10-CM

## 2021-01-01 DIAGNOSIS — I10 ESSENTIAL HYPERTENSION: ICD-10-CM

## 2021-01-01 LAB
ALBUMIN SERPL-MCNC: 3.7 G/DL (ref 3.4–5)
ALBUMIN UR-MCNC: NEGATIVE MG/DL
ALBUMIN UR-MCNC: NEGATIVE MG/DL
ALP SERPL-CCNC: 76 U/L (ref 40–150)
ALT SERPL W P-5'-P-CCNC: 31 U/L (ref 0–50)
ALT SERPL W P-5'-P-CCNC: 36 U/L (ref 0–50)
ANION GAP SERPL CALCULATED.3IONS-SCNC: 11 MMOL/L (ref 3–14)
ANION GAP SERPL CALCULATED.3IONS-SCNC: 5 MMOL/L (ref 3–14)
ANION GAP SERPL CALCULATED.3IONS-SCNC: 5 MMOL/L (ref 3–14)
ANION GAP SERPL CALCULATED.3IONS-SCNC: 6 MMOL/L (ref 3–14)
APPEARANCE UR: ABNORMAL
APPEARANCE UR: CLEAR
AST SERPL W P-5'-P-CCNC: 29 U/L (ref 0–45)
BACTERIA #/AREA URNS HPF: ABNORMAL /HPF
BACTERIA UR CULT: ABNORMAL
BASOPHILS # BLD AUTO: 0 10E3/UL (ref 0–0.2)
BASOPHILS NFR BLD AUTO: 1 %
BILIRUB SERPL-MCNC: 0.5 MG/DL (ref 0.2–1.3)
BILIRUB UR QL STRIP: NEGATIVE
BILIRUB UR QL STRIP: NEGATIVE
BUN SERPL-MCNC: 13 MG/DL (ref 7–30)
BUN SERPL-MCNC: 14 MG/DL (ref 7–30)
BUN SERPL-MCNC: 15 MG/DL (ref 7–30)
BUN SERPL-MCNC: 16 MG/DL (ref 7–30)
CALCIUM SERPL-MCNC: 8.4 MG/DL (ref 8.5–10.1)
CALCIUM SERPL-MCNC: 8.9 MG/DL (ref 8.5–10.1)
CALCIUM SERPL-MCNC: 9.1 MG/DL (ref 8.5–10.1)
CALCIUM SERPL-MCNC: 9.4 MG/DL (ref 8.5–10.1)
CHLORIDE BLD-SCNC: 101 MMOL/L (ref 94–109)
CHLORIDE BLD-SCNC: 103 MMOL/L (ref 94–109)
CHLORIDE BLD-SCNC: 105 MMOL/L (ref 94–109)
CHLORIDE SERPL-SCNC: 102 MMOL/L (ref 94–109)
CHOLEST SERPL-MCNC: 125 MG/DL
CO2 SERPL-SCNC: 21 MMOL/L (ref 20–32)
CO2 SERPL-SCNC: 25 MMOL/L (ref 20–32)
CO2 SERPL-SCNC: 26 MMOL/L (ref 20–32)
CO2 SERPL-SCNC: 26 MMOL/L (ref 20–32)
COLOR UR AUTO: YELLOW
COLOR UR AUTO: YELLOW
CREAT SERPL-MCNC: 0.67 MG/DL (ref 0.52–1.04)
CREAT SERPL-MCNC: 0.76 MG/DL (ref 0.52–1.04)
CREAT SERPL-MCNC: 0.81 MG/DL (ref 0.52–1.04)
CREAT SERPL-MCNC: 1.01 MG/DL (ref 0.52–1.04)
CREAT UR-MCNC: 38 MG/DL
CREAT UR-MCNC: 56 MG/DL
CRP SERPL-MCNC: <2.9 MG/L (ref 0–8)
EOSINOPHIL # BLD AUTO: 0.1 10E3/UL (ref 0–0.7)
EOSINOPHIL NFR BLD AUTO: 2 %
ERYTHROCYTE [DISTWIDTH] IN BLOOD BY AUTOMATED COUNT: 12.4 % (ref 10–15)
ERYTHROCYTE [DISTWIDTH] IN BLOOD BY AUTOMATED COUNT: 12.6 % (ref 10–15)
ERYTHROCYTE [DISTWIDTH] IN BLOOD BY AUTOMATED COUNT: 12.7 % (ref 10–15)
ERYTHROCYTE [SEDIMENTATION RATE] IN BLOOD BY WESTERGREN METHOD: 11 MM/HR (ref 0–30)
FASTING STATUS PATIENT QL REPORTED: YES
GFR SERPL CREATININE-BSD FRML MDRD: 49 ML/MIN/{1.73_M2}
GFR SERPL CREATININE-BSD FRML MDRD: 64 ML/MIN/1.73M2
GFR SERPL CREATININE-BSD FRML MDRD: 69 ML/MIN/1.73M2
GFR SERPL CREATININE-BSD FRML MDRD: 78 ML/MIN/1.73M2
GLUCOSE BLD-MCNC: 101 MG/DL (ref 70–99)
GLUCOSE BLD-MCNC: 89 MG/DL (ref 70–99)
GLUCOSE BLD-MCNC: 93 MG/DL (ref 70–99)
GLUCOSE SERPL-MCNC: 94 MG/DL (ref 70–99)
GLUCOSE UR STRIP-MCNC: NEGATIVE MG/DL
GLUCOSE UR STRIP-MCNC: NEGATIVE MG/DL
HCT VFR BLD AUTO: 36.3 % (ref 35–47)
HCT VFR BLD AUTO: 36.8 % (ref 35–47)
HCT VFR BLD AUTO: 38.6 % (ref 35–47)
HDLC SERPL-MCNC: 76 MG/DL
HGB BLD-MCNC: 12 G/DL (ref 11.7–15.7)
HGB BLD-MCNC: 12.2 G/DL (ref 11.7–15.7)
HGB BLD-MCNC: 13.1 G/DL (ref 11.7–15.7)
HGB UR QL STRIP: ABNORMAL
HGB UR QL STRIP: NEGATIVE
IMM GRANULOCYTES # BLD: 0 10E3/UL
IMM GRANULOCYTES NFR BLD: 0 %
KETONES UR STRIP-MCNC: NEGATIVE MG/DL
KETONES UR STRIP-MCNC: NEGATIVE MG/DL
LDLC SERPL CALC-MCNC: 37 MG/DL
LEUKOCYTE ESTERASE UR QL STRIP: ABNORMAL
LEUKOCYTE ESTERASE UR QL STRIP: NEGATIVE
LYMPHOCYTES # BLD AUTO: 1.4 10E3/UL (ref 0.8–5.3)
LYMPHOCYTES NFR BLD AUTO: 31 %
MCH RBC QN AUTO: 30.8 PG (ref 26.5–33)
MCH RBC QN AUTO: 30.8 PG (ref 26.5–33)
MCH RBC QN AUTO: 30.9 PG (ref 26.5–33)
MCHC RBC AUTO-ENTMCNC: 33.1 G/DL (ref 31.5–36.5)
MCHC RBC AUTO-ENTMCNC: 33.2 G/DL (ref 31.5–36.5)
MCHC RBC AUTO-ENTMCNC: 33.9 G/DL (ref 31.5–36.5)
MCV RBC AUTO: 91 FL (ref 78–100)
MCV RBC AUTO: 93 FL (ref 78–100)
MCV RBC AUTO: 93 FL (ref 78–100)
MICROALBUMIN UR-MCNC: 48 MG/L
MICROALBUMIN UR-MCNC: 8 MG/L
MICROALBUMIN/CREAT UR: 21.05 MG/G CR (ref 0–25)
MICROALBUMIN/CREAT UR: 85.71 MG/G CR (ref 0–25)
MONOCYTES # BLD AUTO: 0.5 10E3/UL (ref 0–1.3)
MONOCYTES NFR BLD AUTO: 12 %
NEUTROPHILS # BLD AUTO: 2.4 10E3/UL (ref 1.6–8.3)
NEUTROPHILS NFR BLD AUTO: 54 %
NITRATE UR QL: NEGATIVE
NITRATE UR QL: POSITIVE
NONHDLC SERPL-MCNC: 49 MG/DL
PH UR STRIP: 6 [PH] (ref 5–7)
PH UR STRIP: 6.5 [PH] (ref 5–7)
PLATELET # BLD AUTO: 176 10E9/L (ref 150–450)
PLATELET # BLD AUTO: 196 10E3/UL (ref 150–450)
PLATELET # BLD AUTO: 229 10E3/UL (ref 150–450)
POTASSIUM BLD-SCNC: 3.5 MMOL/L (ref 3.4–5.3)
POTASSIUM BLD-SCNC: 3.9 MMOL/L (ref 3.4–5.3)
POTASSIUM BLD-SCNC: 3.9 MMOL/L (ref 3.4–5.3)
POTASSIUM SERPL-SCNC: 4 MMOL/L (ref 3.4–5.3)
PROT SERPL-MCNC: 6.8 G/DL (ref 6.8–8.8)
RBC # BLD AUTO: 3.9 10E6/UL (ref 3.8–5.2)
RBC # BLD AUTO: 3.95 10E12/L (ref 3.8–5.2)
RBC # BLD AUTO: 4.25 10E6/UL (ref 3.8–5.2)
RBC #/AREA URNS AUTO: ABNORMAL /HPF
SARS-COV-2 RNA RESP QL NAA+PROBE: NEGATIVE
SARS-COV-2 RNA RESP QL NAA+PROBE: NEGATIVE
SODIUM SERPL-SCNC: 133 MMOL/L (ref 133–144)
SODIUM SERPL-SCNC: 137 MMOL/L (ref 133–144)
SP GR UR STRIP: 1.01 (ref 1–1.03)
SP GR UR STRIP: <=1.005 (ref 1–1.03)
SQUAMOUS #/AREA URNS AUTO: ABNORMAL /LPF
TRANS CELLS #/AREA URNS HPF: ABNORMAL /HPF
TRIGL SERPL-MCNC: 60 MG/DL
UROBILINOGEN UR STRIP-ACNC: 0.2 E.U./DL
UROBILINOGEN UR STRIP-ACNC: 0.2 E.U./DL
WBC # BLD AUTO: 4.4 10E3/UL (ref 4–11)
WBC # BLD AUTO: 4.4 10E9/L (ref 4–11)
WBC # BLD AUTO: 5.6 10E3/UL (ref 4–11)
WBC #/AREA URNS AUTO: >100 /HPF
WBC CLUMPS #/AREA URNS HPF: PRESENT /HPF

## 2021-01-01 PROCEDURE — 36415 COLL VENOUS BLD VENIPUNCTURE: CPT | Performed by: FAMILY MEDICINE

## 2021-01-01 PROCEDURE — 99397 PER PM REEVAL EST PAT 65+ YR: CPT | Mod: 25 | Performed by: FAMILY MEDICINE

## 2021-01-01 PROCEDURE — 80048 BASIC METABOLIC PNL TOTAL CA: CPT | Performed by: NURSE PRACTITIONER

## 2021-01-01 PROCEDURE — 81003 URINALYSIS AUTO W/O SCOPE: CPT | Performed by: PHYSICIAN ASSISTANT

## 2021-01-01 PROCEDURE — 36415 COLL VENOUS BLD VENIPUNCTURE: CPT | Performed by: PHYSICIAN ASSISTANT

## 2021-01-01 PROCEDURE — 80061 LIPID PANEL: CPT | Performed by: FAMILY MEDICINE

## 2021-01-01 PROCEDURE — U0005 INFEC AGEN DETEC AMPLI PROBE: HCPCS

## 2021-01-01 PROCEDURE — 93880 EXTRACRANIAL BILAT STUDY: CPT | Mod: GC | Performed by: RADIOLOGY

## 2021-01-01 PROCEDURE — 99214 OFFICE O/P EST MOD 30 MIN: CPT | Performed by: FAMILY MEDICINE

## 2021-01-01 PROCEDURE — 81001 URINALYSIS AUTO W/SCOPE: CPT | Performed by: FAMILY MEDICINE

## 2021-01-01 PROCEDURE — 92014 COMPRE OPH EXAM EST PT 1/>: CPT | Performed by: STUDENT IN AN ORGANIZED HEALTH CARE EDUCATION/TRAINING PROGRAM

## 2021-01-01 PROCEDURE — U0003 INFECTIOUS AGENT DETECTION BY NUCLEIC ACID (DNA OR RNA); SEVERE ACUTE RESPIRATORY SYNDROME CORONAVIRUS 2 (SARS-COV-2) (CORONAVIRUS DISEASE [COVID-19]), AMPLIFIED PROBE TECHNIQUE, MAKING USE OF HIGH THROUGHPUT TECHNOLOGIES AS DESCRIBED BY CMS-2020-01-R: HCPCS | Performed by: PHYSICIAN ASSISTANT

## 2021-01-01 PROCEDURE — 85652 RBC SED RATE AUTOMATED: CPT | Performed by: PHYSICIAN ASSISTANT

## 2021-01-01 PROCEDURE — 92083 EXTENDED VISUAL FIELD XM: CPT | Performed by: STUDENT IN AN ORGANIZED HEALTH CARE EDUCATION/TRAINING PROGRAM

## 2021-01-01 PROCEDURE — 80048 BASIC METABOLIC PNL TOTAL CA: CPT | Performed by: FAMILY MEDICINE

## 2021-01-01 PROCEDURE — 87186 SC STD MICRODIL/AGAR DIL: CPT | Performed by: FAMILY MEDICINE

## 2021-01-01 PROCEDURE — 92012 INTRM OPH EXAM EST PATIENT: CPT | Performed by: STUDENT IN AN ORGANIZED HEALTH CARE EDUCATION/TRAINING PROGRAM

## 2021-01-01 PROCEDURE — U0005 INFEC AGEN DETEC AMPLI PROBE: HCPCS | Performed by: PHYSICIAN ASSISTANT

## 2021-01-01 PROCEDURE — 90662 IIV NO PRSV INCREASED AG IM: CPT | Performed by: FAMILY MEDICINE

## 2021-01-01 PROCEDURE — 99213 OFFICE O/P EST LOW 20 MIN: CPT | Mod: 25 | Performed by: FAMILY MEDICINE

## 2021-01-01 PROCEDURE — 99214 OFFICE O/P EST MOD 30 MIN: CPT | Performed by: NURSE PRACTITIONER

## 2021-01-01 PROCEDURE — 86140 C-REACTIVE PROTEIN: CPT | Performed by: PHYSICIAN ASSISTANT

## 2021-01-01 PROCEDURE — 80053 COMPREHEN METABOLIC PANEL: CPT | Performed by: PHYSICIAN ASSISTANT

## 2021-01-01 PROCEDURE — 82043 UR ALBUMIN QUANTITATIVE: CPT | Performed by: FAMILY MEDICINE

## 2021-01-01 PROCEDURE — 82043 UR ALBUMIN QUANTITATIVE: CPT | Performed by: NURSE PRACTITIONER

## 2021-01-01 PROCEDURE — 99214 OFFICE O/P EST MOD 30 MIN: CPT | Performed by: PHYSICIAN ASSISTANT

## 2021-01-01 PROCEDURE — 99213 OFFICE O/P EST LOW 20 MIN: CPT | Performed by: INTERNAL MEDICINE

## 2021-01-01 PROCEDURE — 87086 URINE CULTURE/COLONY COUNT: CPT | Performed by: FAMILY MEDICINE

## 2021-01-01 PROCEDURE — 36415 COLL VENOUS BLD VENIPUNCTURE: CPT | Performed by: NURSE PRACTITIONER

## 2021-01-01 PROCEDURE — 71250 CT THORAX DX C-: CPT | Mod: GC | Performed by: RADIOLOGY

## 2021-01-01 PROCEDURE — 92015 DETERMINE REFRACTIVE STATE: CPT | Performed by: STUDENT IN AN ORGANIZED HEALTH CARE EDUCATION/TRAINING PROGRAM

## 2021-01-01 PROCEDURE — 85025 COMPLETE CBC W/AUTO DIFF WBC: CPT | Performed by: PHYSICIAN ASSISTANT

## 2021-01-01 PROCEDURE — 84460 ALANINE AMINO (ALT) (SGPT): CPT | Performed by: FAMILY MEDICINE

## 2021-01-01 PROCEDURE — 85027 COMPLETE CBC AUTOMATED: CPT | Performed by: FAMILY MEDICINE

## 2021-01-01 PROCEDURE — G0008 ADMIN INFLUENZA VIRUS VAC: HCPCS | Performed by: FAMILY MEDICINE

## 2021-01-01 PROCEDURE — 92133 CPTRZD OPH DX IMG PST SGM ON: CPT | Performed by: STUDENT IN AN ORGANIZED HEALTH CARE EDUCATION/TRAINING PROGRAM

## 2021-01-01 PROCEDURE — U0003 INFECTIOUS AGENT DETECTION BY NUCLEIC ACID (DNA OR RNA); SEVERE ACUTE RESPIRATORY SYNDROME CORONAVIRUS 2 (SARS-COV-2) (CORONAVIRUS DISEASE [COVID-19]), AMPLIFIED PROBE TECHNIQUE, MAKING USE OF HIGH THROUGHPUT TECHNOLOGIES AS DESCRIBED BY CMS-2020-01-R: HCPCS

## 2021-01-01 RX ORDER — ERYTHROMYCIN 5 MG/G
0.5 OINTMENT OPHTHALMIC AT BEDTIME
Qty: 3.5 G | Refills: 11 | Status: SHIPPED | OUTPATIENT
Start: 2021-01-01

## 2021-01-01 RX ORDER — DORZOLAMIDE HCL 20 MG/ML
1 SOLUTION/ DROPS OPHTHALMIC 2 TIMES DAILY
Qty: 5 ML | Refills: 12 | Status: SHIPPED | OUTPATIENT
Start: 2021-01-01

## 2021-01-01 RX ORDER — LATANOPROST 50 UG/ML
1 SOLUTION/ DROPS OPHTHALMIC AT BEDTIME
Qty: 2.5 ML | Refills: 12 | Status: CANCELLED | OUTPATIENT
Start: 2021-01-01

## 2021-01-01 RX ORDER — CEPHALEXIN 500 MG/1
500 CAPSULE ORAL 2 TIMES DAILY
Qty: 14 CAPSULE | Refills: 0 | Status: SHIPPED | OUTPATIENT
Start: 2021-01-01

## 2021-01-01 RX ORDER — POLYETHYLENE GLYCOL 3350 17 G/17G
1 POWDER, FOR SOLUTION ORAL DAILY
Qty: 578 G | Refills: 3 | Status: SHIPPED | OUTPATIENT
Start: 2021-01-01

## 2021-01-01 RX ORDER — AMLODIPINE BESYLATE 5 MG/1
2.5 TABLET ORAL DAILY
Qty: 90 TABLET | Refills: 3 | Status: SHIPPED | OUTPATIENT
Start: 2021-01-01

## 2021-01-01 RX ORDER — AMLODIPINE BESYLATE 2.5 MG/1
TABLET ORAL
Qty: 90 TABLET | Refills: 0 | Status: SHIPPED | OUTPATIENT
Start: 2021-01-01 | End: 2021-01-01

## 2021-01-01 RX ORDER — ATORVASTATIN CALCIUM 20 MG/1
20 TABLET, FILM COATED ORAL DAILY
Qty: 90 TABLET | Refills: 3 | Status: SHIPPED | OUTPATIENT
Start: 2021-01-01

## 2021-01-01 RX ORDER — SENNA AND DOCUSATE SODIUM 50; 8.6 MG/1; MG/1
1 TABLET, FILM COATED ORAL AT BEDTIME
Qty: 30 TABLET | Refills: 3 | Status: SHIPPED | OUTPATIENT
Start: 2021-01-01

## 2021-01-01 RX ORDER — ATORVASTATIN CALCIUM 20 MG/1
TABLET, FILM COATED ORAL
Qty: 90 TABLET | Refills: 0 | Status: SHIPPED | OUTPATIENT
Start: 2021-01-01 | End: 2021-01-01

## 2021-01-01 ASSESSMENT — MIFFLIN-ST. JEOR
SCORE: 788.18
SCORE: 915.76
SCORE: 817.78

## 2021-01-01 ASSESSMENT — REFRACTION_MANIFEST
OD_ADD: +3.00
OS_SPHERE: -3.00
OD_CYLINDER: +2.50
OS_ADD: +3.00
OS_CYLINDER: +1.75
OD_SPHERE: -2.25
OD_AXIS: 007
OS_AXIS: 165

## 2021-01-01 ASSESSMENT — EXTERNAL EXAM - RIGHT EYE
OD_EXAM: NORMAL

## 2021-01-01 ASSESSMENT — TONOMETRY
OS_IOP_MMHG: 20
OD_IOP_MMHG: 17
OS_IOP_MMHG: 15
OS_IOP_MMHG: 19
IOP_METHOD: APPLANATION
IOP_METHOD: APPLANATION
OD_IOP_MMHG: 16
OD_IOP_MMHG: 22
IOP_METHOD: APPLANATION

## 2021-01-01 ASSESSMENT — EXTERNAL EXAM - LEFT EYE
OS_EXAM: NORMAL

## 2021-01-01 ASSESSMENT — PAIN SCALES - GENERAL
PAINLEVEL: NO PAIN (0)

## 2021-01-01 ASSESSMENT — ENCOUNTER SYMPTOMS
DIARRHEA: 0
HEMATOCHEZIA: 0
FREQUENCY: 0
BREAST MASS: 0
ABDOMINAL PAIN: 0
FEVER: 0
NAUSEA: 0
HEARTBURN: 0
EYE PAIN: 0
JOINT SWELLING: 0
SORE THROAT: 0
PALPITATIONS: 0
CONSTIPATION: 1
CHILLS: 0
SHORTNESS OF BREATH: 0
DYSURIA: 0
MYALGIAS: 0
COUGH: 0
HEMATURIA: 0
HEADACHES: 0
WEAKNESS: 1
DIZZINESS: 0
PARESTHESIAS: 0
NERVOUS/ANXIOUS: 0
ARTHRALGIAS: 1

## 2021-01-01 ASSESSMENT — REFRACTION_WEARINGRX
OS_AXIS: 172
SPECS_TYPE: SVL;SUNGLS
OS_CYLINDER: +3.50
OD_AXIS: 011
OD_CYLINDER: +3.00
OS_SPHERE: -3.25
OD_SPHERE: -1.00

## 2021-01-01 ASSESSMENT — SLIT LAMP EXAM - LIDS
COMMENTS: TR BLEPHARITIS, 2+ MEIBOMIAN GLAND DYSFUNCTION

## 2021-01-01 ASSESSMENT — VISUAL ACUITY
METHOD: SNELLEN - LINEAR
CORRECTION_TYPE: GLASSES
METHOD: SNELLEN - LINEAR
OD_CC: 20/50
OS_CC: 20/50
OS_CC+: -2
OS_CC: 20/40+3
OD_CC: 20/50
METHOD: SNELLEN - LINEAR
OS_CC: 20/50
CORRECTION_TYPE: GLASSES
CORRECTION_TYPE: GLASSES
OS_CC+: -1
OD_CC: 20/30-1
OD_CC+: +1
OD_CC+: -2

## 2021-01-01 ASSESSMENT — CUP TO DISC RATIO
OD_RATIO: 0.7
OD_RATIO: 0.7
OS_RATIO: 0.7
OD_RATIO: 0.7
OS_RATIO: 0.7
OS_RATIO: 0.7

## 2021-01-01 ASSESSMENT — ACTIVITIES OF DAILY LIVING (ADL)
CURRENT_FUNCTION: SHOPPING REQUIRES ASSISTANCE
CURRENT_FUNCTION: HOUSEWORK REQUIRES ASSISTANCE
CURRENT_FUNCTION: PREPARING MEALS REQUIRES ASSISTANCE

## 2021-02-09 ENCOUNTER — MYC MEDICAL ADVICE (OUTPATIENT)
Dept: FAMILY MEDICINE | Facility: CLINIC | Age: 86
End: 2021-02-09

## 2021-02-10 NOTE — TELEPHONE ENCOUNTER
The patients son called back and expressed frustration because he is not getting anywhere trying to schedule his mother for a covid vaccination.  I gave him the covid vaccine scheduling line [phone] to call.  Breana Botello,

## 2021-03-28 NOTE — TELEPHONE ENCOUNTER
Please review this message. Diann has appointment for bp check but has a new issue she wants to discuss.     Has had two dizzy spells today.  Had her second Moderna vaccine on 3/26/21 at noon.    Had been fine except yesterday, 3/27 she went out to the store and got chills. Chills are one of the side effects we have seen after vaccine administration. Diann understands this.    Now, first thing this a.m.when she opened her eyes everything was spinning.  It resolved on it's own.    Dizziness happened after lunch, again, then. She was putting her head down into the sink to wash her hair and became dizzy again.  This resolved on it's own and she was able to get her hair washed. This last episode was about 1 or 2pm today.    She denied weakness.    She said she feels a little less steady on her feet than she usually does.    Diann was fine when we were speaking.  She wonders if the dizziness could be a reaction to her vaccine two days ago.    Fortunately Diann already has an appointment tomorrow with her provider.  I'll message Diann's doctor's pool and let them know that Diann wants to address this tomorrow in addition to her blood pressure check. Her current appointment is is a short visit.    I cautioned Diann about falls. I instructed she move slowly and deliberately and to expect she may get dizzy again. Falling is less likely if she's prepared for it.    I instructed she stay hydrated. She said has been very good about doing this. She has not had any diarrhea.  No fever.    Call back if dizziness returns or other symptoms develop.  Diann stated understanding and agreement.    COVID 19 Nurse Triage Plan/Patient Instructions    Please be aware that novel coronavirus (COVID-19) may be circulating in the community. If you develop symptoms such as fever, cough, or SOB or if you have concerns about the presence of another infection including coronavirus (COVID-19), please contact your health care provider or visit  [email or web address].     Disposition/Instructions    In-Person Visit with provider recommended. Reference Visit Selection Guide.    Thank you for taking steps to prevent the spread of this virus.  o Limit your contact with others.  o Wear a simple mask to cover your cough.  o Wash your hands well and often.    Resources    [facility]: About COVID-19: [email or web address]    CDC: What to Do If You're Sick: www.cdc.gov/coronavirus/2019-ncov/about/steps-when-sick.html    CDC: Ending Home Isolation: www.cdc.gov/coronavirus/2019-ncov/hcp/disposition-in-home-patients.html     CDC: Caring for Someone: www.cdc.gov/coronavirus/2019-ncov/if-you-are-sick/care-for-someone.html     [facility]: Interim Guidance for Hospital Discharge to Home: [email or web address]./diseases/[email or web address]/hcp/hospdischarge.pdf    [facility] clinical trials (COVID-19 research studies): [email or web address].[facility]-clinical-trials     Below are the COVID-19 hotlines at the [place] Department of [facility] ([facility]). Interpreters are available.   o For health questions: Call 101-221-2737 or 1-160.557.1541 (7 a.m. to 7 p.m.)  o For questions about schools and childcare: Call 106-555-7620 or 1-385.187.4942 (7 a.m. to 7 p.m.)     Additional Information    Negative: [1] Weakness (i.e., paralysis, loss of muscle strength) of the face, arm or leg on one side of the body AND [2] sudden onset AND [3] present now    Negative: [1] Numbness (i.e., loss of sensation) of the face, arm or leg on one side of the body AND [2] sudden onset AND [3] present now    Negative: [1] Loss of speech or garbled speech AND [2] sudden onset AND [3] present now    Negative: Difficult to awaken or acting confused (e.g., disoriented, slurred speech)    Negative: Sounds like a life-threatening emergency to the triager    Negative: SEVERE dizziness (vertigo) (e.g., unable to walk without assistance)    Negative: [1] Dizziness (vertigo) present now AND [2] one  or more stroke risk factors (i.e., hypertension, diabetes, prior stroke/TIA/heart attack)  (Exception: prior physician evaluation for this AND no different/worse than usual)    Negative: [1] Dizziness (vertigo) present now AND [2] age > 60  (Exception: prior physician evaluation for this AND no different/worse than usual)    Negative: Severe headache (e.g., excruciating)  (Exception: similar to previous migraines)    Negative: Patient sounds very sick or weak to the triager    Negative: Taking a medicine that could cause dizziness (e.g., phenytoin [Dilantin], carbamazepine [Tegretol], primidone [Mysoline])    [1] MODERATE dizziness (e.g., vertigo; feels very unsteady, interferes with normal activities) AND [2] has NOT been evaluated by physician for this    Protocols used: DIZZINESS - VERTIGO-A-    Routed: P 38042 ALEYDA WADE RN [facility]

## 2021-03-29 NOTE — PATIENT INSTRUCTIONS
At [facility], we strive to deliver an exceptional experience to you, every time we see you. If you receive a survey, please complete it as we do value your feedback.  If you have MyChart, you can expect to receive results automatically within 24 hours of their completion.  Your provider will send a note interpreting your results as well.   If you do not have MyChart, you should receive your results in about a week by mail.    Your care team:                            Family Medicine Internal Medicine   MD Ghulam Santos MD Shantel Branch-Fleming, MD Srinivasa Vaka, MD Katya Belousova, PAANTONI Alexander, APRN CNP    Roderick Hager, MD Pediatrics   Chidi Oakley, PAANTONI Maynard, CNP MD Lily Hoffman APRN CNP   MD Jaimie Stockton MD Deborah Mielke, MD Krysten Donohue, APRN CNP      Clinic hours: Monday - Thursday 7 am-6 pm; Fridays 7 am-5 pm.   Urgent care: Monday - Friday 11 am-9 pm; Saturday and Sunday 9 am-5 pm.    Clinic: [phone]       [facility]: Monday - Thursday 8 am - 7 pm; Friday 8 am - 6 pm  [facility]: [phone]     Use [email or web address] for 24/7 diagnosis and treatment of dozens of conditions.  Patient Education     Anatomy of the Inner Ear     There are 2 parts of the inner ear. One part (hearing canal) is for hearing. The other part (balance canal) is for balance.  The canals are filled with a fluid called endolymph. A small organ called the endolymphatic sac keeps the level of this fluid up. In the hearing canal, sound waves cause vibrations in the endolymph. The inner ear detects these sound waves. It then sends nerve signals to the brain. The sound we hear is a result of how the brain interprets these nerve signals.  In the balance canals, a change in position causes the fluid to move. This movement is detected in the balance part of the inner  ear. Nerve signals are then sent to the brain.  The balance canals collect balance information. The hearing canal collects sound information. The hearing and balance nerves carry this information to the brain from both parts of the inner ear.  [employer] last reviewed this educational content on 12/1/2019 2000-2020 [employer]. All rights reserved. This information is not intended as a substitute for professional medical care. Always follow your healthcare professional's instructions.           Patient Education     Inner Ear Problems: Causes of Dizziness (Vertigo)        Benign positional vertigo (BPV)   This is the most common cause of vertigo. BPV is also called benign positional paroxysmal vertigo (BPPV). It happens when crystals in the ear canals shift into the wrong place. Vertigo usually occurs when you move your head in a certain way. This can happen when turning in bed, bending, or looking up. Because BPV comes on quickly, you should think about if you are safe to drive or do other tasks that need your full attention.   BPV:    Causes vertigo that lasts for seconds. Vertigo can occur several times a day, depending on body position.    Doesn t cause hearing loss.    Often goes away on its own. But it may go away sooner with treatment.  Infection or inflammation  Sometimes the semicircular canals swell and send incorrect balance signals. This problem may be caused by a viral infection. Depending on the cause, your hearing can be affected (labyrinthitis). Or your hearing can remain normal (neuronitis).   Infection or inflammation:    Causes vertigo that lasts for hours or days. The first episode is usually the worst.    Can cause hearing loss.    Often goes away on its own. But it may go away sooner with treatment.  You may need vestibular rehabilitation if you have balance problems that don't go away.   Meniere s disease  This condition is uncommon. It happens when there is too much fluid in  the ear canals. This causes increased pressure and swelling. It affects balance and hearing signals.   Meniere s disease may:    Cause vertigo that last for hours    Cause hearing problems that come and go. The problems are usually in one ear and get worse over time.    Cause buzzing or ringing in the ears (tinnitus)    Cause a feeling of fullness or pressure in the ear    Cause any of these lasting symptoms: vertigo, hearing loss, tinnitus, or ear fullness  Other causes of vertigo  Vertigo can also be caused by:     A head injury    Certain medicines    Migraines    Brain problems, such as a stroke or bleeding in the brain    [employer] last reviewed this educational content on 2/1/2020 2000-2020 [employer]. All rights reserved. This information is not intended as a substitute for professional medical care. Always follow your healthcare professional's instructions.

## 2021-03-29 NOTE — PROGRESS NOTES
Assessment & Plan     Hypertension goal BP (blood pressure) < 140/90  Blood pressure a little high today and will recheck  - CBC with platelets  - Basic metabolic panel    Mild protein-calorie malnutrition (H)  Weight is stable. Appetite not very good but eating normal foods    Chronic obstructive pulmonary disease, unspecified COPD type (H)  stable    Hyperlipidemia LDL goal <70  Stable on medications     Asymptomatic stenosis of left carotid artery  70% stenosis and no change    ASVD (arteriosclerotic vascular disease)  On medication and no current angina    Benign paroxysmal positional vertigo, unspecified laterality  Discussed etiology and symptoms. No treatment needed at this time. Need to be careful changing head positions. May be related to vaccine but this is not a typical side effect.                FURTHER TESTING:       - Only if symptoms persist  FUTURE LABS:       - Schedule fasting labs in 6 months  FUTURE APPOINTMENTS:       - Follow-up visit in 6 months or sooner if any questions or concerns.   See Patient Instructions    Return in about 6 months (around 9/29/2021) for Follow up, Routine preventive, Blood pressure check.    Esmer Finley MD  [facility] JORGE Tidwell is a 89 year old who presents for the following health issues   HPI       Covid shot #2 last week and went shopping the next day. Had a couple of episodes of dizziness. These lasted only a couple of minutes and seemed like the room was spinning, happened when she bent over and straightened up again. Feels back to normal now    Hypertension Follow-up      Do you check your blood pressure regularly outside of the clinic? Yes     Are you following a low salt diet? No    Are your blood pressures ever more than 140 on the top number (systolic) OR more   than 90 on the bottom number (diastolic), for example 140/90? Yes      How many servings of fruits and vegetables do you eat daily?  2-3    On  "average, how many sweetened beverages do you drink each day (Examples: soda, juice, sweet tea, etc.  Do NOT count diet or artificially sweetened beverages)?   0    How many days per week do you exercise enough to make your heart beat faster? 3 or less    How many minutes a day do you exercise enough to make your heart beat faster? 9 or less    How many days per week do you miss taking your medication? 0    Hyperlipidemia Follow-Up      Are you regularly taking any medication or supplement to lower your cholesterol?   Yes- atorvastatin 20    Are you having muscle aches or other side effects that you think could be caused by your cholesterol lowering medication?  No    Vascular Disease Follow-up      How often do you take nitroglycerin? Never    Do you take an aspirin every day? Yes    COPD Follow-Up    Overall, how are your COPD symptoms since your last clinic visit?  No change    How much fatigue or shortness of breath do you have when you are walking?  Same as usual    How much shortness of breath do you have when you are resting?  Same as usual    How often do you cough? Rarely    Have you noticed any change in your sputum/phlegm?  No    Have you experienced a recent fever? No    Please describe how far you can walk without stopping to rest:  The length of 3-5 rooms    How many flights of stairs are you able to walk up without stopping?  1    Have you had any Emergency Room Visits, Urgent Care Visits, or Hospital Admissions because of your COPD since your last office visit?  No    History   Smoking Status     Former Smoker     Quit date: 1/1/1970   Smokeless Tobacco     Never Used     No results found for: FEV1, GAY6GRA        Review of Systems   Constitutional, HEENT, cardiovascular, pulmonary, gi and gu systems are negative, except as otherwise noted.      Objective    BP (!) 148/72   Pulse 77   Temp 98.5  F (36.9  C) (Tympanic)   Ht 1.651 m (5' 5\")   Wt 49 kg (108 lb)   LMP 05/10/1978 (Approximate)   SpO2 " 100%   BMI 17.97 kg/m    Body mass index is 17.97 kg/m .  Physical Exam   GENERAL: elderly, alert, thin, well hydrated, no distress, walks without assistance and able to get on exam table with minimal help  HENT: ear canals- normal; TMs- normal; Nose- normal; Mouth- no ulcers, no lesions, missing dentition  NECK: no tenderness, no adenopathy, no asymmetry, no masses, no stiffness; thyroid- normal to palpation  RESP: lungs clear to auscultation - no rales, no rhonchi, no wheezes  CV: regular rates and rhythm, normal S1 S2, no S3 or S4 and no murmur, no click or rub, normal pulses  ABDOMEN: soft, no tenderness, no  hepatosplenomegaly, no masses, normal bowel sounds  MS: extremities- no gross deformities noted, no edema  SKIN: no suspicious lesions, no rashes, age related skin changes with seborrheic keratosis and no actinic keratosis.    NEURO: strength and tone- normal, sensory exam- grossly normal, reflexes- symmetric  BACK: no CVA tenderness, no paralumbar tenderness  MENTAL STATUS EXAM:  Appearance/Behavior: no apparent distress, neatly groomed, dressed appropriately for weather, appears stated age and is frail-appearing  Speech: normal  Mood/Affect: normal affect  Insight: Good     Results for orders placed or performed in visit on 03/29/21   CBC with platelets     Status: None   Result Value Ref Range    WBC 4.4 4.0 - 11.0 10e9/L    RBC Count 3.95 3.8 - 5.2 10e12/L    Hemoglobin 12.2 11.7 - 15.7 g/dL    Hematocrit 36.8 35.0 - 47.0 %    MCV 93 78 - 100 fl    MCH 30.9 26.5 - 33.0 pg    MCHC 33.2 31.5 - 36.5 g/dL    RDW 12.4 10.0 - 15.0 %    Platelet Count 176 150 - 450 10e9/L   Basic metabolic panel     Status: Abnormal   Result Value Ref Range    Sodium 133 133 - 144 mmol/L    Potassium 4.0 3.4 - 5.3 mmol/L    Chloride 102 94 - 109 mmol/L    Carbon Dioxide 26 20 - 32 mmol/L    Anion Gap 5 3 - 14 mmol/L    Glucose 94 70 - 99 mg/dL    Urea Nitrogen 16 7 - 30 mg/dL    Creatinine 1.01 0.52 - 1.04 mg/dL    GFR  Estimate 49 (L) >60 mL/min/[1.73_m2]    GFR Estimate If Black 57 (L) >60 mL/min/[1.73_m2]    Calcium 9.1 8.5 - 10.1 mg/dL

## 2021-03-29 NOTE — TELEPHONE ENCOUNTER
Patient scheduled to see PCP today at 1:20 pm.  Routing to provider, see below as update on status and to discuss dizziness today at time of visit.    Renetta Smith RN  [facility]

## 2021-03-30 NOTE — RESULT ENCOUNTER NOTE
Dear Diann    Your test results are attached. I am happy to let you know that they are stable.    Your sodium level is good and you can drink water like you are doing. Your kidney function is a little low and we need to check this again in 3 months. I will put in a future lab order for you.     The blood sugar is normal and you do not have diabetes. The hemoglobin is normal and you do not have anemia.     Please contact me by [employer] if you have any questions about your labs or management. You may also call my office number [phone] for any questions.     Esmer Finley MD

## 2021-04-02 NOTE — TELEPHONE ENCOUNTER
Patient also requesting medication for blood pressure and cholesterol medication. Pended.    Yash Villalobos CMA     Transposition Flap Text: The defect edges were debeveled with a #15 scalpel blade.  Given the location of the defect and the proximity to free margins a transposition flap was deemed most appropriate.  Using a sterile surgical marker, an appropriate transposition flap was drawn incorporating the defect.    The area thus outlined was incised deep to adipose tissue with a #15 scalpel blade.  The skin margins were undermined to an appropriate distance in all directions utilizing iris scissors.

## 2021-05-28 NOTE — PROGRESS NOTES
Chief complaint: Allergies    History of present illness: This is a pleasant 87-year-old woman here today for evaluation of allergies.  She states she has a long standing history of allergies.  She was tested when she was much younger.  States that she was positive to multiple environmental allergens.  She has a lot of congestion and drainage.  She feels sinus pressure.  Recently she is noted that her symptoms have worsened.  It was suggested to her that she could have dairy allergy or wheat allergy causing the symptoms.  She has no hives, swelling or shortness of breath on eating these foods.  She has limited some of these foods and lost quite a bit of weight.  She reports her congestion and drainage have improved slightly.  Reports that she will wake up in the middle the night choking on phlegm.  She states she becomes very anxious at this time.  No history of asthma.  She denies any wheezing or shortness of breath.  She states the cough is mostly present at night.  She does note a history of COPD.  She states is very mild and she takes Incruse Ellipta for this.  No history of reflux.  She denies food getting stuck when she swallows.  She states she has difficulty swallowing her phlegm, however.  She does have fluticasone nasal spray and Atrovent nasal spray to use.  She reports that Atrovent seems to dry her out quite a bit.  She has used Flonase but has not used it regularly in quite some time.  She does not use nasal rinses.  She states is very frustrated by her symptoms and feels that she is not getting adequate answers.    Past medical history: Hyperlipidemia, COPD, hypertension    Social history: She is retired, lives in a home essentially in her basement, no exposure to mold, no pets, former smoker    Family history: Grandfather with asthma, no family members with allergies    Review of Systems performed as above and the remainder is negative.         Current Outpatient Medications:      albuterol  "(PROVENTIL HFA) 90 mcg/actuation inhaler, Inhale 2 puffs as needed., Disp: , Rfl:      amLODIPine (NORVASC) 2.5 MG tablet, Take 2.5 mg by mouth daily., Disp: , Rfl: 3     ascorbic acid, vitamin C, (VITAMIN C) 1000 MG tablet, Take 1,000 mg by mouth daily., Disp: , Rfl:      aspirin (ASPIR-81 ORAL), Take 81 mg by mouth daily., Disp: , Rfl:      atorvastatin (LIPITOR) 20 MG tablet, Take 20 mg by mouth daily., Disp: , Rfl: 2     DOCOSAHEXANOIC ACID ORAL, Take 1 capsule by mouth daily., Disp: , Rfl:      erythromycin ophthalmic ointment, Administer 1 application to both eyes daily., Disp: , Rfl: 1     fluticasone propionate (FLONASE) 50 mcg/actuation nasal spray, 1-2 sprays into each nostril daily., Disp: , Rfl:      ipratropium (ATROVENT) 42 mcg (0.06 %) nasal spray, 1 spray into each nostril daily., Disp: , Rfl: 0     ipratropium (ATROVENT) 42 mcg (0.06 %) nasal spray, 1 spray into each nostril 3 (three) times a day., Disp: , Rfl:      tiotropium (SPIRIVA WITH HANDIHALER) 18 mcg inhalation capsule, Place 18 mcg into inhaler and inhale daily., Disp: , Rfl:      umeclidinium (INCRUSE ELLIPTA) 62.5 mcg/actuation DsDv inhaler, Inhale 1 puff daily., Disp: , Rfl:     No Known Allergies    Pulse 88   Resp 15   Ht 5' 1\" (1.549 m)   Wt 109 lb (49.4 kg)   SpO2 97%   BMI 20.60 kg/m    Gen: Pleasant female not in acute distress  HEENT: Eyes no erythema of the bulbar or palpebral conjunctiva, no edema. Ears: TMs well visualized, no effusions. Nose: No congestion, mucosa normal. Mouth: Throat clear, no lip or tongue edema.   Cardiac: Regular rate and rhythm, no murmurs, rubs or gallops  Respiratory: Clear to auscultation bilaterally, no adventitious breath sounds  Lymph: No supraclavicular or cervical lymphadenopathy  Skin: No rashes or lesions  Psych: Alert and oriented times 3  Last Percutaneous Allergy Test Results  Trees  [place]ok, White  1:20 H  (W/F in mm): 0-0 (05/16/19 1218)  Birch Mix 1:20 H (W/F in mm): 0-0 (05/16/19 " 1218)  [place], Common 1:20 H (W/F in mm): 0-0 (05/16/19 1218)  Elm, American 1:20 H (W/F in mm): 0-0 (05/16/19 1218)  [place], Tragbark 1:20 H (W/F in mm): 0-0 (05/16/19 1218)  Maple, Hard/Sugar 1:20 H (W/F in mm): 0-0 (05/16/19 1218)  [place] Mix 1:20 H (W/F in mm): 0-0 (05/16/19 1218)  Oak, Red 1:20 H (W/F in mm): 0-0 (05/16/19 1218)  [place], American 1:20 H (W/F in mm): 0-0 (05/16/19 1218)  [place] Tree 1:20 H (W/F in mm): 0-0 (05/16/19 1218)  Dust Mites  D. Pteronyssinus Mite 30,000 AU/ML H (W/F in mm): 0-0 (05/16/19 1218)  D. Farinae Mite 30,000 AU/ML H (W/F in mm: 0-0 (05/16/19 1218)  Grasses  Grass Mix #4 10,000 BAU/ML H: 0-0 (05/16/19 1218)  Claudio Grass 1:20 H (W/F in mm): 0-0 (05/16/19 1218)  Cockroach  Cockroach Mix 1:10 H (W/F in mm): 0-0 (05/16/19 1218)  Molds/Fungi  Alternaria Tenuis 1:10 H (W/F in mm): 0-0 (05/16/19 1218)  Aspergillus Fumigatus 1:10 H (W/F in mm): 0-0 (05/16/19 1218)  Homodendrum Cladosporioides 1:10 H (W/F in mm): 0-0 (05/16/19 1218)  Penicillin Notatum 1:10 H (W/F in mm): 0-0 (05/16/19 1218)  Epicoccum 1:10 H (W/F in mm): 0-0 (05/16/19 1218)  Weeds  Ragweed, Short 1:20 H (W/F in mm): 0-0 (05/16/19 1218)  Dock, Sorrel 1:20 H (W/F in mm): 0-0 (05/16/19 1218)  Lamb's Quarter 1:20 H (W/F in mm): 0-0 (05/16/19 1218)  Pigweed, Rough Red Root 1:20 H  (W/F in mm): 0-0 (05/16/19 1218)  Plantain, English 1:20 H  (W/F in mm): 0-0 (05/16/19 1218)  Sagebrush, Mugwort 1:20 H  (W/F in mm): 0-0 (05/16/19 1218)  Animal  Cat 10,000 BAU/ML H (W/F in mm): 0-0 (05/16/19 1218)  Dog 1:10 H (W/F in mm): 0-0 (05/16/19 1218)  Controls  Device Type: QUINTIP (05/16/19 1218)  Neg. control: 50% Glycerine/Saline H (W/F in mm): 0-0 (05/16/19 1218)  Pos. control: Histamine 6mg/ML (W/F in mms): 4-10 (05/16/19 1218)    Spirometry was performed. FEV1/FVC 74%.  FEV1 1.95 L or 138% of predicted.  FVC 2.63 L or 136% of predicted.      Impression report and plan:    1.  Postnasal drainage    Allergy testing was  negative.  Symptoms are not consistent with an IgE mediated food allergy.  For this reason, I did not recommend food testing.  She could have a food sensitivity or intolerance but I am concerned there is more going on.  I did do a spirometry and she has excellent lung function.  I would like her to use nasal rinses followed by fluticasone nasal spray 1 spray each nostril twice daily for 1 month.  If symptoms do not improve, I recommend ENT evaluation.  Perhaps gastroenterology evaluation could be considered given the choking that she has at night as well.  Follow-up as needed.

## 2021-05-28 NOTE — PATIENT INSTRUCTIONS - HE
Allergy testing negative    Food allergy - hives, swelling, shortness of breath      Margarito Med Sinus Rinse    Fluticasone nasal spray 1 spray each nostril twice daily --aim out and back    Use regularly    Next step, ENT versus swallowing (GI)

## 2021-05-28 NOTE — TELEPHONE ENCOUNTER
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 She is wondering if she can go back to eating dairy. Please Diann at [phone].

## 2021-05-28 NOTE — TELEPHONE ENCOUNTER
Spoke with patient and relayed Dr Sidhu message. (I will paste below)    -She doesn't have a dairy allergy but perhaps an intolerance.  I think that she can start adding in dairy slowly, but I would maybe wait until she has been on the nasal spray and rinses for at least 1-2 weeks

## 2021-06-19 NOTE — LETTER
Letter by Yeny Prakash MD at      Author: Yeny Prakash MD Service: -- Author Type: --    Filed:  Encounter Date: 5/16/2019 Status: (Other)         May 16, 2019     Esmer Finley MD  [address]  [place] [address]    Patient: Diann John   MR Number: [id number]   YOB: 1931   Date of Visit: 5/16/2019     Dear Dr. Tushar MD:    Thank you for referring Diann John to me for evaluation.  Environmental allergy testing was negative.  Symptoms were not consistent with an IgE mediated food allergy.  I asked her to use nasal rinses followed by Flonase for at least 1 month.  If symptoms do not improve, I would consider ENT evaluation.    If you have questions, please do not hesitate to call me.    Sincerely,        Yeny Prakash MD        CC  No Recipients    Progress Notes:Chief complaint: Allergies    History of present illness: This is a pleasant 87-year-old woman here today for evaluation of allergies.  She states she has a long standing history of allergies.  She was tested when she was much younger.  States that she was positive to multiple environmental allergens.  She has a lot of congestion and drainage.  She feels sinus pressure.  Recently she is noted that her symptoms have worsened.  It was suggested to her that she could have dairy allergy or wheat allergy causing the symptoms.  She has no hives, swelling or shortness of breath on eating these foods.  She has limited some of these foods and lost quite a bit of weight.  She reports her congestion and drainage have improved slightly.  Reports that she will wake up in the middle the night choking on phlegm.  She states she becomes very anxious at this time.  No history of asthma.  She denies any wheezing or shortness of breath.  She states the cough is mostly present at night.  She does note a history of COPD.  She states is very mild and she takes Incruse Ellipta for this.  No history of reflux.  She denies food  getting stuck when she swallows.  She states she has difficulty swallowing her phlegm, however.  She does have fluticasone nasal spray and Atrovent nasal spray to use.  She reports that Atrovent seems to dry her out quite a bit.  She has used Flonase but has not used it regularly in quite some time.  She does not use nasal rinses.  She states is very frustrated by her symptoms and feels that she is not getting adequate answers.    Past medical history: Hyperlipidemia, COPD, hypertension    Social history: She is retired, lives in a home essentially in her basement, no exposure to mold, no pets, former smoker    Family history: Grandfather with asthma, no family members with allergies    Review of Systems performed as above and the remainder is negative.         Current Outpatient Medications:   ?  albuterol (PROVENTIL HFA) 90 mcg/actuation inhaler, Inhale 2 puffs as needed., Disp: , Rfl:   ?  amLODIPine (NORVASC) 2.5 MG tablet, Take 2.5 mg by mouth daily., Disp: , Rfl: 3  ?  ascorbic acid, vitamin C, (VITAMIN C) 1000 MG tablet, Take 1,000 mg by mouth daily., Disp: , Rfl:   ?  aspirin (ASPIR-81 ORAL), Take 81 mg by mouth daily., Disp: , Rfl:   ?  atorvastatin (LIPITOR) 20 MG tablet, Take 20 mg by mouth daily., Disp: , Rfl: 2  ?  DOCOSAHEXANOIC ACID ORAL, Take 1 capsule by mouth daily., Disp: , Rfl:   ?  erythromycin ophthalmic ointment, Administer 1 application to both eyes daily., Disp: , Rfl: 1  ?  fluticasone propionate (FLONASE) 50 mcg/actuation nasal spray, 1-2 sprays into each nostril daily., Disp: , Rfl:   ?  ipratropium (ATROVENT) 42 mcg (0.06 %) nasal spray, 1 spray into each nostril daily., Disp: , Rfl: 0  ?  ipratropium (ATROVENT) 42 mcg (0.06 %) nasal spray, 1 spray into each nostril 3 (three) times a day., Disp: , Rfl:   ?  tiotropium (SPIRIVA WITH HANDIHALER) 18 mcg inhalation capsule, Place 18 mcg into inhaler and inhale daily., Disp: , Rfl:   ?  umeclidinium (INCRUSE ELLIPTA) 62.5 mcg/actuation DsDv  "inhaler, Inhale 1 puff daily., Disp: , Rfl:     No Known Allergies    Pulse 88   Resp 15   Ht 5' 1\" (1.549 m)   Wt 109 lb (49.4 kg)   SpO2 97%   BMI 20.60 kg/m     Gen: Pleasant female not in acute distress  HEENT: Eyes no erythema of the bulbar or palpebral conjunctiva, no edema. Ears: TMs well visualized, no effusions. Nose: No congestion, mucosa normal. Mouth: Throat clear, no lip or tongue edema.   Cardiac: Regular rate and rhythm, no murmurs, rubs or gallops  Respiratory: Clear to auscultation bilaterally, no adventitious breath sounds  Lymph: No supraclavicular or cervical lymphadenopathy  Skin: No rashes or lesions  Psych: Alert and oriented times 3  Last Percutaneous Allergy Test Results  Trees  Ashko, White  1:20 H  (W/F in mm): 0-0 (05/16/19 1218)  Birch Mix 1:20 H (W/F in mm): 0-0 (05/16/19 1218)  [place], Common 1:20 H (W/F in mm): 0-0 (05/16/19 1218)  Elm, American 1:20 H (W/F in mm): 0-0 (05/16/19 1218)  [place]ar, Shagbark 1:20 H (W/F in mm): 0-0 (05/16/19 1218)  Maple, Hard/Sugar 1:20 H (W/F in mm): 0-0 (05/16/19 1218)  [place] Mix 1:20 H (W/F in mm): 0-0 (05/16/19 1218)  Oak, Red 1:20 H (W/F in mm): 0-0 (05/16/19 1218)  [place]on, American 1:20 H (W/F in mm): 0-0 (05/16/19 1218)  [place] Tree 1:20 H (W/F in mm): 0-0 (05/16/19 1218)  Dust Mites  D. Pteronyssinus Mite 30,000 AU/ML H (W/F in mm): 0-0 (05/16/19 1218)  D. Farinae Mite 30,000 AU/ML H (W/F in mm: 0-0 (05/16/19 1218)  Grasses  Grass Mix #4 10,000 BAU/ML H: 0-0 (05/16/19 1218)  Claudio Grass 1:20 H (W/F in mm): 0-0 (05/16/19 1218)  Cockroach  Cockroach Mix 1:10 H (W/F in mm): 0-0 (05/16/19 1218)  Molds/Fungi  Alternaria Tenuis 1:10 H (W/F in mm): 0-0 (05/16/19 1218)  Aspergillus Fumigatus 1:10 H (W/F in mm): 0-0 (05/16/19 1218)  Homodendrum Cladosporioides 1:10 H (W/F in mm): 0-0 (05/16/19 1218)  Penicillin Notatum 1:10 H (W/F in mm): 0-0 (05/16/19 1218)  Epicoccum 1:10 H (W/F in mm): 0-0 (05/16/19 1218)  Weeds  Ragweed, Short 1:20 H (W/F " in mm): 0-0 (05/16/19 1218)  Dock, Sorrel 1:20 H (W/F in mm): 0-0 (05/16/19 1218)  Lamb's Quarter 1:20 H (W/F in mm): 0-0 (05/16/19 1218)  Pigweed, Rough Red Root 1:20 H  (W/F in mm): 0-0 (05/16/19 1218)  Plantain, English 1:20 H  (W/F in mm): 0-0 (05/16/19 1218)  Sagebrush, Mugwort 1:20 H  (W/F in mm): 0-0 (05/16/19 1218)  Animal  Cat 10,000 BAU/ML H (W/F in mm): 0-0 (05/16/19 1218)  Dog 1:10 H (W/F in mm): 0-0 (05/16/19 1218)  Controls  Device Type: QUINTIP (05/16/19 1218)  Neg. control: 50% Glycerine/Saline H (W/F in mm): 0-0 (05/16/19 1218)  Pos. control: Histamine 6mg/ML (W/F in mms): 4-10 (05/16/19 1218)    Spirometry was performed. FEV1/FVC 74%.  FEV1 1.95 L or 138% of predicted.  FVC 2.63 L or 136% of predicted.      Impression report and plan:    1.  Postnasal drainage    Allergy testing was negative.  Symptoms are not consistent with an IgE mediated food allergy.  For this reason, I did not recommend food testing.  She could have a food sensitivity or intolerance but I am concerned there is more going on.  I did do a spirometry and she has excellent lung function.  I would like her to use nasal rinses followed by fluticasone nasal spray 1 spray each nostril twice daily for 1 month.  If symptoms do not improve, I recommend ENT evaluation.  Perhaps gastroenterology evaluation could be considered given the choking that she has at night as well.  Follow-up as needed.

## 2021-07-11 NOTE — TELEPHONE ENCOUNTER
Pt's daughter calling to report that her mom was seen at [facility] today, and she is very displeased at the service the pt received.    She said that the provider sent them to the ED for a certain type of scan, and said that he would call ahead to let them know she was coming.   When they arrived, they were told that no one called them, and she had to register as an ED pt in order to get the scan.    The pt's daughter feels as if they should not have to pay for either the urgent care visit or the ED visit.    Pt's daughter transferred to patient advocacy line, as she is driving, and couldn't take down the number.    Shirlene Elizabeth RN 07/11/21 4:10 PM  [facility] [place] Nurse Advisor

## 2021-07-11 NOTE — PROGRESS NOTES
Assessment & Plan     Blurred vision  89-year-old female presented with headache, intermittent blurred vision, headache is started about 2 to 3 days ago, experienced couple episodes of blurred vision which lasted transiently.  No other relevant systemic symptoms.  Physical examination remarkable for significantly elevated blood pressure and possible afferent pupillary defect involving right eye.  Differentials discussed in detail including but not limited to neurological etiology, malignant hypertension.  Needs further evaluation to delineate a specific diagnosis.  Recommended to go ER for further evaluation.  Patient understood and in agreement with above plan.  All question answered.      Essential hypertension  As above        Blair Esqueda MD  [facility] STACIAWINTER Tidwell is a 89 year old who presents for the following health issues    HPI     Concern -   Onset: 2-3 days ago   Description: x2-3 days. From ears up. Had a couple episodes of blurry vision last night. Afraid it will happen when she is driving  Intensity: moderate  Progression of Symptoms:  Same   Accompanying Signs & Symptoms: No fever, chills, balance problem, limb weakness, speech difficulty or other relevant systemic symptoms   Previous history of similar problem: no  Therapies tried and outcome: None      Review of Systems   Constitutional, HEENT, cardiovascular, pulmonary, GI, , musculoskeletal, neuro, skin, endocrine and psych systems are negative, except as otherwise noted.      Objective    BP (!) 186/78   Pulse 77   Temp 97.7  F (36.5  C) (Tympanic)   Resp 18   Wt 46.9 kg (103 lb 6.4 oz)   LMP 05/10/1978 (Approximate)   SpO2 100%   BMI 17.21 kg/m    Body mass index is 17.21 kg/m .  Physical Exam   GENERAL: alert and no distress  EYES: Possible right afferent pupillary defect, extraocular movement intact, normal visual field, no discharge noted  HENT: ear canals and TM's normal, nose and  mouth without ulcers or lesions  NECK: no adenopathy, no asymmetry, masses, or scars and thyroid normal to palpation  RESP: lungs clear to auscultation - no rales, rhonchi or wheezes  CV: regular rate and rhythm, normal S1 S2, no S3 or S4, no murmur, click or rub, no peripheral edema and peripheral pulses strong  ABDOMEN: soft, nontender, no hepatosplenomegaly, no masses and bowel sounds normal  MS: extremities normal- no gross deformities noted  NEURO: Normal strength and tone, sensory exam grossly normal, mentation intact, speech normal and DTR's normal and symmetric bilaterally

## 2021-07-13 NOTE — LETTER
"    7/13/2021         RE: Diann John  [address] [address]        Dear Colleague,    Thank you for referring your patient, Diann John, to the [facility]. Please see a copy of my visit note below.     Current Eye Medications:  Lid scrubs, e- oint prn     Subjective:    Pt reports that a week ago her vision blurred out for several second in both eyes, then returned. She then had another episode where vision was out for focus for several seconds and again returned. No problems since.     She was seen at urgent care and sent to ED 2 days ago for further workup; told to f/u with eye doctor.     Objective:  See Ophthalmology Exam.      Assessment:  Diann John is a 89 year old female who presents with:   Encounter Diagnoses   Name Primary?     Glaucoma suspect of both eyes Intraocular pressure 22/20 with increased cupping both eyes. Recommend glaucoma testing.      Pseudophakia OU      Macular pucker, right eye      PVD (posterior vitreous detachment), bilateral      Meibomian gland dysfunction (MGD) of both eyes      Squamous blepharitis of upper and lower eyelids of both eyes        Plan:  Continue lid scrubs daily and erythromycin ointment at bedtime in both eyes     Use artificial tears up to four times a day (like Refresh Optive, Systane Balance, TheraTears, or generic artificial tears are ok. Avoid \"get the red out\" drops).    Recommend following up with Dr. Finley for blood pressure check, f/u after fall and ED visit    Lisa Page MD  [phone]              Again, thank you for allowing me to participate in the care of your patient.        Sincerely,        Lisa Page MD    "

## 2021-07-13 NOTE — PROGRESS NOTES
" Current Eye Medications:  Lid scrubs, e- oint prn     Subjective:    Pt reports that a week ago her vision blurred out for several second in both eyes, then returned. She then had another episode where vision was out for focus for several seconds and again returned. No problems since.     She was seen at urgent care and sent to ED 2 days ago for further workup; told to f/u with eye doctor.     Objective:  See Ophthalmology Exam.      Assessment:  Diann John is a 89 year old female who presents with:   Encounter Diagnoses   Name Primary?     Glaucoma suspect of both eyes Intraocular pressure 22/20 with increased cupping both eyes. Recommend glaucoma testing.      Pseudophakia OU      Macular pucker, right eye      PVD (posterior vitreous detachment), bilateral      Meibomian gland dysfunction (MGD) of both eyes      Squamous blepharitis of upper and lower eyelids of both eyes        Plan:  Continue lid scrubs daily and erythromycin ointment at bedtime in both eyes     Use artificial tears up to four times a day (like Refresh Optive, Systane Balance, TheraTears, or generic artificial tears are ok. Avoid \"get the red out\" drops).    Recommend following up with Dr. Finley for blood pressure check, f/u after fall and ED visit    Lisa Page MD  [phone]          "

## 2021-08-19 NOTE — PATIENT INSTRUCTIONS
At [facility], we strive to deliver an exceptional experience to you, every time we see you. If you receive a survey, please complete it as we do value your feedback.  If you have MyChart, you can expect to receive results automatically within 24 hours of their completion.  Your provider will send a note interpreting your results as well.   If you do not have MyChart, you should receive your results in about a week by mail.    Your care team:                            Family Medicine Internal Medicine   MD Ghulam Santos MD Shantel Branch-Fleming, MD Srinivasa Vaka, MD Katya Belousova, PAANTONI Alexander, APRN CNP    Roderick Hager, MD Pediatrics   Chidi Oakley, PAANTONI Maynard, CNP MD Lily Hoffman APRN CNP   MD Jaimie Stockton MD Deborah Mielke, MD Krysten Donohue, APRN [facility]      Clinic hours: Monday - Thursday 7 am-6 pm; Fridays 7 am-5 pm.   Urgent care: Monday - Friday 10 am- 8 pm; Saturday and Sunday 9 am-5 pm.    Clinic: [phone]       [facility]: Monday - Thursday 8 am - 7 pm; Friday 8 am - 6 pm  [facility]: [phone]     Use www.oncare.org for 24/7 diagnosis and treatment of dozens of conditions.    Patient Education     Preventing Falls: How to Prepare and What to Do    Falling is not something you want to think about. But it can make a big difference to plan ahead. If you're prepared, you'll know how to get help. And you'll be less likely to panic if you fall. This means you'll be able to do what's needed to get help right away.  How to prepare    Have someone check on you daily.    Keep a list of emergency numbers near the phone.    Always have a way to call for help. Keep a cell phone with you at all times. Or talk with your healthcare provider about how to set up a home monitoring service. This involves wearing a small device around your neck or  wrist. If you fall, you can press the button on the device. This alerts emergency responders.    Talk with your healthcare provider about an exercise program that's right for you. Regular exercise may reduce the risk of falling and the risk for injury related to a fall.    Have good lighting in your home. Don't use throw rugs, because they can raise your risk of tripping and falling. Add grab bars in the bathroom to help reduce the risk of falling. Small changes can make your home safer. Talk with your healthcare provider about making your home safer.    What to do if you fall  Above all, try to stay calm:    If you start to fall, try to relax your body. This will reduce the impact of the fall.    After you fall, press your monitor button, or use your phone to call for help.    Don't rush to get up. First, make sure you're not hurt.    Roll onto your side, then crawl to a chair. Pull yourself up onto the chair slowly.    Get checked if you struck your head, lost consciousness, were confused afterward, or have any other concerns for injury.    Tell your healthcare provider that you fell.  A note to family and friends  If you're with a loved one when he or she starts to fall, don't try to stop the fall. Ease the person to the floor carefully, so neither of you gets hurt. Don't leave the person alone. And don't try to move him or her. Put a pillow under his or her head. Check for injuries. If help is needed right away, call 911.  Me[employer] You last reviewed this educational content on 5/1/2020 2000-2021 [employer]. All rights reserved. This information is not intended as a substitute for professional medical care. Always follow your healthcare professional's instructions.           Patient Education     Exercises to Prevent Falls  Certain types of exercises may help make you less likely to fall.  Try the ones below or do other exercises that your healthcare provider suggests.  Depending on your health, you  may need to start slowly. Don't let that stop you. Even small amounts of exercise can help you. Talk with your healthcare provider before starting any exercise program.       Improve balance  Many types of exercise can help improve balance. Sai chi and yoga are good examples. Here's another one to try. You can do it anytime and almost anywhere.    Stand next to a counter or solid support.    Push yourself up onto your tiptoes.    Hold for 5 seconds. If you start to lose your balance, hold on to the counter.    Rest and repeat 5 times. Work up to holding for 20 to 30 seconds, if you can. Increase flexibility  Being more flexible makes it easier for you to move around safely. Try exercises like the seated hamstring stretch.    Sit in a chair and put one foot on a stool.    Straighten your leg and reach with both hands down either side of your leg. Reach as far down your leg as you can.    Hold for about 20 seconds.    Go back to the starting position. Then repeat 5 times. Switch legs. Build strength  Resistance exercises help build strength. You can do them without equipment. Or you can use weights, elastic bands, or special machines. One such exercise is called the biceps curl. You can hold a 1-pound (0.45 kg) weight or even a can of soup. Do this exercise at least 3 times a week. Strive for every day.    Sit up straight in a chair.    Keep your elbow close to your body and your wrist straight.    Bend your arm, moving your hand up to your shoulder. Then slowly lower your arm.    Repeat 5 times. Switch to the other arm.   Build your staying power  Aerobic exercises make your heart and lungs stronger so you can keep moving longer. Walking and swimming are 2 of the best types of exercises you can do. Using a stationary bike is great, too. Find an aerobic exercise that you enjoy. Start slowly and build up. Even 5 minutes is helpful. Aim for a goal of 30 minutes, at least 3 times a week. You don't have to do 30 minutes  in 1 session. Break it up and walk a little throughout the day.  Starting out safely and slowly    Start easy. Slowly work up to doing more.    Talk with your healthcare provider about the best exercises for you.    Call senior centers or health clubs about exercise programs.    If needed, have a family member watch you walk every so often to check your stability.    Exercise with a friend. Choose an activity you both enjoy.    Consider juan chi or yoga to strengthen your balance.    Try exercises that you can do anytime, anywhere. Here are 2 examples. Have someone with you when you first try these:  ? Practice walking by placing one foot right in front of the other.  ? Stand up and sit down 10 times. Repeat this throughout the day.    [employer] last reviewed this educational content on 4/1/2020 2000-2021 [employer]. All rights reserved. This information is not intended as a substitute for professional medical care. Always follow your healthcare professional's instructions.

## 2021-08-19 NOTE — PROGRESS NOTES
"    Assessment & Plan     Fall, initial encounter  Getting home evaluation for safety as she has fallen several times in the last year, reviewed home safety- avoiding throw rugs, using night lights, having clear walk pathology, sitting at side of bed before making position changes, change position changes slowly, etc.she denies feeling dizzy or weak, thinks her falls are because she is moving too quickly. We could consider CTA to check for  Perfusion abnormalities but she'd like to discuss with her primary about this.  - HOME CARE NURSING REFERRAL    Pulmonary emphysema, unspecified emphysema type (H)  Stable.    Mild protein-calorie malnutrition (H)  Stable, continue to monitor    Hyperlipidemia LDL goal <70  Continue on Lipitor 20 mg daily    Hypertension goal BP (blood pressure) < 140/90  Elevated BMP checking Mibroalbumin, advised low salt diet,, check home BP-call  if > 140/90   - Albumin Random Urine Quantitative with Creat Ratio  - Albumin Random Urine Quantitative with Creat Ratio    Elevated serum creatinine  Repeat BMP  - **Basic metabolic panel FUTURE anytime    Slow transit constipation  Refilled Miralax, increase water consumption , get regular exercise, high fiber diet discussed  - polyethylene glycol (MIRALAX) 17 GM/Dose powder  Dispense: 578 g; Refill: 3  - SENNA-docusate sodium (SENNA S) 8.6-50 MG tablet  Dispense: 30 tablet; Refill: 3      See Patient Instructions    Return in about 4 weeks (around 9/16/2021), or if symptoms worsen or fail to improve, for Follow up.    YOBANY Harrison CNP  [facility] is a 89 year old who presents for the following health issues  HPI     Patient comes in following a fall 2 weeks ago.  She set her alarm for 0400 and sat up in bed, states she \"didn't feel herself\" and thought she might still be dreaming/not fully awake.  When she stood, she got her feet tangled in her bed linens and fell forward hitting the top " of her head on her bedside drawer that was slightly open.  She had a 2.5 cm superficial laceration to her left forehead which bled profusely per patient which is now healed.  She continues to have 3 X 5 cm hematoma on frontal aspect  Head that is mildly TTP.  She denies CP, shortness of breath, palpitations,  Weakness, numbness/tingling, no blurred vision, nausea or vomiting.  She denies LOC and was able to get up from the floor with assist of her daughter.  She states she did not sleep well the night she fell and wonders if this had something to do with her fall.      She was seen in ER 7/15/21 following a fall while walking [place]. She had head CT without contrast that showed no acute intracranial CT findings and Age-related involutional and microangiopathic changes. US of the globe was normal as well (complained of blurry vision). She had similar fall  episode about a year ago at which time she was diagnosed with a UTI and was thought to have inner ear issue.  .  Hyperlipidemia Follow-Up      Are you regularly taking any medication or supplement to lower your cholesterol?   Yes- Lipitor 20 mg daily    Are you having muscle aches or other side effects that you think could be caused by your cholesterol lowering medication?  No    Hypertension Follow-up      Do you check your blood pressure regularly outside of the clinic? Yes     Are you following a low salt diet? No    Are your blood pressures ever more than 140 on the top number (systolic) OR more   than 90 on the bottom number (diastolic), for example 140/90? Yes    Vascular Disease Follow-up      How often do you take nitroglycerin? Never    Do you take an aspirin every day? Yes    COPD Follow-Up    Overall, how are your COPD symptoms since your last clinic visit?  No change    How much fatigue or shortness of breath do you have when you are walking?  Same as usual    How much shortness of breath do you have when you are resting?  Same  as usual    How often do you cough? Rarely    Have you noticed any change in your sputum/phlegm?  No    Have you experienced a recent fever? No    Please describe how far you can walk without stopping to rest:  The length of 3-5 rooms    How many flights of stairs are you able to walk up without stopping?  1    Have you had any Emergency Room Visits, Urgent Care Visits, or Hospital Admissions because of your COPD since your last office visit?  No    History   Smoking Status     Former Smoker     Quit date: 1/1/1970   Smokeless Tobacco     Never Used     No results found for: FEV1, [id number]      How many servings of fruits and vegetables do you eat daily?  2-3    On average, how many sweetened beverages do you drink each day (Examples: soda, juice, sweet tea, etc.  Do NOT count diet or artificially sweetened beverages)?   0    How many days per week do you exercise enough to make your heart beat faster? 3 or less    How many minutes a day do you exercise enough to make your heart beat faster? 9 or less    How many days per week do you miss taking your medication? 0      Review of Systems   Constitutional, HEENT, cardiovascular, pulmonary, gi and gu systems are negative, except as otherwise noted.      Objective    BP (!) 160/62   Pulse 84   Ht 1.524 m (5')   Wt 47.6 kg (105 lb)   LMP 05/10/1978 (Approximate)   SpO2 98%   BMI 20.51 kg/m    Body mass index is 20.51 kg/m .  Physical Exam   GENERAL: healthy, alert and no distress  EYES: Eyes grossly normal to inspection, PERRL and conjunctivae and sclerae normal  HENT: ear canals and TM's normal, nose and mouth without ulcers or lesions  NECK: no adenopathy, no asymmetry, masses, or scars and thyroid normal to palpation  RESP: lungs clear to auscultation - no rales, rhonchi or wheezes  CV: regular rate and rhythm, normal S1 S2, no S3 or S4, no murmur, click or rub, no peripheral edema and peripheral pulses strong  ABDOMEN: soft, nontender, no hepatosplenomegaly, no  masses and bowel sounds normal  MS: no gross musculoskeletal defects noted, no edema  SKIN: no suspicious lesions or rashes  NEURO: Normal strength and tone, sensory exam grossly normal, mentation intact and DTR's normal and symmetric   BACK: no CVA tenderness, no paralumbar tenderness  PSYCH: mentation appears normal, affect normal/bright    No results found for this or any previous visit (from the past 24 hour(s)).

## 2021-08-22 NOTE — RESULT ENCOUNTER NOTE
Dear Diann    Your test results are attached. I am happy to let you know that they are stable.    Your kidney test is back to normal. Very happy to see this. We can recheck labs in 6 months.     Please contact me by "[employer]t if you have any questions about your labs or management. You may also call my office number [phone] for any questions.     Esmer Finley MD

## 2021-09-13 NOTE — PATIENT INSTRUCTIONS
Start dorzolamide (orange top) twice a day in both eyes     Return for eye pressure check in 3 weeks. If you forget to take your drop the night before your appointment, please reschedule.    Continue warm compresses and washing eyelash area with baby shampoo    Ok to continue using erythromycin ointment at bedtime     Lisa Page MD  [phone]

## 2021-09-13 NOTE — PROGRESS NOTES
Current Eye Medications:   Warm soaks with baby shampoo lid hygiene, and erythromycin ointment both eyes every evening.       Subjective:  Follow up glaucoma suspect.  Patient is here for a refraction, pressure check, OCT, and visual field.  Vision appears to be good in each eye. Both eyes continue to water which interferes with her vision.       Objective:  See Ophthalmology Exam.      Assessment:  Diann John is a 89 year old female who presents with:   Encounter Diagnoses   Name Primary?     Primary open angle glaucoma (POAG) of both eyes, moderate stage Intraocular pressure 16/19 today. Avoid latanoprost due to possible iris color change and avoid BB due to asthma. Start dorzolamide both eyes.     OCT optic nerve: avg retinal nerve fiber layer 59/66 (thin S&I right eye; thin S left eye).     [place] visual field (HVF) 24-2: inf>sup arcuate defect right eye; inf nasal step left eye.      Pseudophakia OU      Macular pucker, right eye      PVD (posterior vitreous detachment), bilateral      Meibomian gland dysfunction (MGD) of both eyes      Squamous blepharitis of upper and lower eyelids of both eyes      Presbyopia        Plan:  Start dorzolamide (orange top) twice a day in both eyes     Return for eye pressure check in 3 weeks. If you forget to take your drop the night before your appointment, please reschedule.    Continue warm compresses and washing eyelash area with baby shampoo    Ok to continue using erythromycin ointment at bedtime     Lisa Page MD  [phone]

## 2021-09-13 NOTE — LETTER
9/13/2021         RE: Diann John  [address] [id number]        Dear Colleague,    Thank you for referring your patient, Diann John, to the [facility]. Please see a copy of my visit note below.     Current Eye Medications:   Warm soaks with baby shampoo lid hygiene, and erythromycin ointment both eyes every evening.       Subjective:  Follow up glaucoma suspect.  Patient is here for a refraction, pressure check, OCT, and visual field.  Vision appears to be good in each eye. Both eyes continue to water which interferes with her vision.       Objective:  See Ophthalmology Exam.      Assessment:  Diann John is a 89 year old female who presents with:   Encounter Diagnoses   Name Primary?     Primary open angle glaucoma (POAG) of both eyes, moderate stage Intraocular pressure 16/19 today. Avoid latanoprost due to possible iris color change and avoid BB due to asthma. Start dorzolamide both eyes.     OCT optic nerve: avg retinal nerve fiber layer 59/66 (thin S&I right eye; thin S left eye).     Clay[place] visual field (HVF) 24-2: inf>sup arcuate defect right eye; inf nasal step left eye.      Pseudophakia OU      Macular pucker, right eye      PVD (posterior vitreous detachment), bilateral      Meibomian gland dysfunction (MGD) of both eyes      Squamous blepharitis of upper and lower eyelids of both eyes      Presbyopia        Plan:  Start dorzolamide (orange top) twice a day in both eyes     Return for eye pressure check in 3 weeks. If you forget to take your drop the night before your appointment, please reschedule.    Continue warm compresses and washing eyelash area with baby shampoo    Ok to continue using erythromycin ointment at bedtime     Lisa Page MD  [phone]          Again, thank you for allowing me to participate in the care of your patient.        Sincerely,        Lisa Page MD

## 2021-10-04 NOTE — TELEPHONE ENCOUNTER
Routing refill request to provider for review/approval because:  Labs not current:  lipids  BP Readings from Last 3 Encounters:   08/19/21 (!) 160/62   07/11/21 (!) 186/78   03/29/21 (!) 148/72     Gloria MONTOYAN, RN

## 2021-10-13 NOTE — PATIENT INSTRUCTIONS
Continue dorzolamide (orange top) twice a day in both eyes     Continue erythromycin ointment at bedtime in both eyes and cleaning the eyelids/lashes daily    Lisa Page MD  [phone]

## 2021-10-13 NOTE — LETTER
10/13/2021         RE: Diann John  [address] [address]        Dear Colleague,    Thank you for referring your patient, Diann John, to the [facility]. Please see a copy of my visit note below.     Current Eye Medications:  Trusopt both eyes twice a day.  Last drops: 7am.    Erythromycin ointment both eyes every evening.  Lid hygiene.       Subjective:  Follow up Open Angle Glaucoma.  Patient is here for a pressure check.  She is unsure if she is tolerating the drops - she has difficulty instilling the drops because the bottle is difficult to manage.   She feels her vision is decreasing in each eye.     Objective:  See Ophthalmology Exam.      Assessment:  Diann John is a 89 year old female who presents with:   Encounter Diagnoses   Name Primary?     Primary open angle glaucoma (POAG) of both eyes, moderate stage Intraocular pressure 17/15 today. Has some trouble getting the drops in, but just asked her to do her best.     Pseudophakia OU      Macular pucker, right eye      PVD (posterior vitreous detachment), bilateral      Meibomian gland dysfunction (MGD) of both eyes      Squamous blepharitis of upper and lower eyelids of both eyes        Plan:  Continue dorzolamide (orange top) twice a day in both eyes     Continue erythromycin ointment at bedtime in both eyes and cleaning the eyelids/lashes daily    Lisa Page MD  [phone]                 Again, thank you for allowing me to participate in the care of your patient.        Sincerely,        Lisa Page MD

## 2021-10-13 NOTE — PROGRESS NOTES
Current Eye Medications:  Trusopt both eyes twice a day.  Last drops: 7am.    Erythromycin ointment both eyes every evening.  Lid hygiene.       Subjective:  Follow up Open Angle Glaucoma.  Patient is here for a pressure check.  She is unsure if she is tolerating the drops - she has difficulty instilling the drops because the bottle is difficult to manage.   She feels her vision is decreasing in each eye.     Objective:  See Ophthalmology Exam.      Assessment:  Diann John is a 89 year old female who presents with:   Encounter Diagnoses   Name Primary?     Primary open angle glaucoma (POAG) of both eyes, moderate stage Intraocular pressure 17/15 today. Has some trouble getting the drops in, but just asked her to do her best.     Pseudophakia OU      Macular pucker, right eye      PVD (posterior vitreous detachment), bilateral      Meibomian gland dysfunction (MGD) of both eyes      Squamous blepharitis of upper and lower eyelids of both eyes        Plan:  Continue dorzolamide (orange top) twice a day in both eyes     Continue erythromycin ointment at bedtime in both eyes and cleaning the eyelids/lashes daily    Lisa Page MD  [phone]

## 2021-10-21 NOTE — PROGRESS NOTES
"          SUBJECTIVE:   Diann John is a 89 year old female who presents for Preventive Visit.      Patient has been advised of split billing requirements and indicates understanding: No  Are you in the first 12 months of your Medicare Part B coverage?  No    Physical Health:    In general, how would you rate your overall physical health? fair    Outside of work, how many days during the week do you exercise? none    Outside of work, approximately how many minutes a day do you exercise?not applicable    If you drink alcohol do you typically have >3 drinks per day or >7 drinks per week? No    Do you usually eat at least 4 servings of fruit and vegetables a day, include whole grains & fiber and avoid regularly eating high fat or \"junk\" foods? NO    Do you have any problems taking medications regularly?  No    Do you have any side effects from medications? none    Needs assistance for the following daily activities: no assistance needed    Which of the following safety concerns are present in your home?  none identified     Hearing impairment: Yes, Difficulty following a conversation in a noisy restaurant or crowded room.    Need to ask people to speak up or repeat themselves.    Uses hearing aids    In the past 6 months, have you been bothered by leaking of urine? yes    Mental Health:    In general, how would you rate your overall mental or emotional health? good  PHQ-2 Score: (P) 2    Do you feel safe in your environment? Yes- but does not feel safe driving because everyone is in such a rush.     Have you ever done Advance Care Planning? (For example, a Health Directive, POLST, or a discussion with a medical provider or your loved ones about your wishes): Yes, advance care planning is on file.    Additional concerns to address?  No    Fall risk:  Fallen 2 or more times in the past year?: (P) No  Any fall with injury in the past year?: (P) No    Cognitive Screenin) Repeat 3 items (Leader, Season, Table)  "   2) Clock draw: NORMAL  3) 3 item recall: Recalls 2 objects   Results: NORMAL clock, 1-2 items recalled: COGNITIVE IMPAIRMENT LESS LIKELY    Mini-CogTM Copyright ROCHELLE Solorzano. Licensed by the author for use in [place] [employer]; reprinted with permission (kelly@[email or web address]). All rights reserved.      Do you have sleep apnea, excessive snoring or daytime drowsiness?: no        Hyperlipidemia Follow-Up      Are you regularly taking any medication or supplement to lower your cholesterol?   Yes- atorvastatin 20    Are you having muscle aches or other side effects that you think could be caused by your cholesterol lowering medication?  No    Hypertension Follow-up      Do you check your blood pressure regularly outside of the clinic? Yes     Are you following a low salt diet? Yes    Are your blood pressures ever more than 140 on the top number (systolic) OR more   than 90 on the bottom number (diastolic), for example 140/90? No    Vascular Disease Follow-up      How often do you take nitroglycerin? Never    Do you take an aspirin every day? Yes      Reviewed and updated as needed this visit by clinical staff  Tobacco  Allergies  Meds  Problems  Med Hx  Surg Hx  Fam Hx  Soc Hx          Reviewed and updated as needed this visit by Provider   Allergies  Meds  Problems            Social History     Tobacco Use     Smoking status: Former Smoker     Quit date: 1970     Years since quittin.8     Smokeless tobacco: Never Used   Substance Use Topics     Alcohol use: No                           Current providers sharing in care for this patient include:   Patient Care Team:  Esmer Finley MD as PCP - General (Family Practice)  Esmer Finley MD as Assigned PCP  Lisa Page MD as Assigned Surgical Provider    The following health maintenance items are reviewed in Epic and correct as of today:  Health Maintenance   Topic Date Due     COPD ACTION PLAN  Never done     INFLUENZA VACCINE (1)  09/01/2021     FALL RISK ASSESSMENT  10/15/2021     MEDICARE ANNUAL WELLNESS VISIT  10/15/2021     EYE EXAM  07/13/2022     BMP  08/19/2022     MICROALBUMIN  08/19/2022     ANNUAL REVIEW OF HM ORDERS  08/19/2022     DTAP/TDAP/TD IMMUNIZATION (3 - Td or Tdap) 03/16/2025     ADVANCE CARE PLANNING  10/15/2025     SPIROMETRY  Completed     PHQ-2  Completed     Pneumococcal Vaccine: 65+ Years  Completed     ZOSTER IMMUNIZATION  Completed     COVID-19 Vaccine  Completed     IPV IMMUNIZATION  Aged Out     MENINGITIS IMMUNIZATION  Aged Out     HEPATITIS B IMMUNIZATION  Aged Out     Lab work is in process  Labs reviewed in EPIC  BP Readings from Last 3 Encounters:   10/21/21 (!) 166/79   08/19/21 (!) 160/62   07/11/21 (!) 186/78    Wt Readings from Last 3 Encounters:   10/21/21 44.3 kg (97 lb 9.6 oz)   08/19/21 47.6 kg (105 lb)   07/11/21 46.9 kg (103 lb 6.4 oz)                  Patient Active Problem List   Diagnosis     Macular pucker, right eye     Pseudophakia OU     Blepharitis     Hypertension goal BP (blood pressure) < 140/90     Seasonal allergies     Carotid artery stenosis, asymptomatic     PVD (posterior vitreous detachment) OU     Advanced directives, counseling/discussion     ASVD (arteriosclerotic vascular disease)     Hyperlipidemia LDL goal <70     Chronic rhinitis     Pulmonary nodules     Pulmonary emphysema, unspecified emphysema type (H)     Family history of colon cancer     Protein-calorie malnutrition (H)     E. coli UTI (urinary tract infection), recurrent     Past Surgical History:   Procedure Laterality Date     APPENDECTOMY       C RAD RESEC TONSIL/PILLARS      Adeniods     CATARACT IOL, RT/LT Right      COLONOSCOPY WITH CO2 INSUFFLATION N/A 8/7/2019    Procedure: COLONOSCOPY, WITH CO2 INSUFFLATION;  Surgeon: Faizan Morton MD;  Location:  OR     HAND SURGERY       NOSE SURGERY         Social History     Tobacco Use     Smoking status: Former Smoker     Quit date: 1/1/1970     Years  since quittin.8     Smokeless tobacco: Never Used   Substance Use Topics     Alcohol use: No     Family History   Problem Relation Age of Onset     Cancer Father      Glaucoma Father      Cancer Mother      Hypertension Mother      Hypertension Sister      Cerebrovascular Disease Sister      Thyroid Disease No family hx of      Macular Degeneration No family hx of          Current Outpatient Medications   Medication Sig Dispense Refill     amLODIPine (NORVASC) 5 MG tablet Take 0.5 tablets (2.5 mg) by mouth daily 90 tablet 3     atorvastatin (LIPITOR) 20 MG tablet Take 1 tablet (20 mg) by mouth daily 90 tablet 3     albuterol (PROAIR HFA/PROVENTIL HFA/VENTOLIN HFA) 108 (90 Base) MCG/ACT inhaler Inhale 2 puffs into the lungs every 4 hours as needed for shortness of breath / dyspnea or wheezing 1 Inhaler 3     ascorbic acid (VITAMIN C) 1000 MG TABS Take 1,000 mg by mouth daily       aspirin 81 MG tablet Take 1 tablet (81 mg) by mouth daily 30 tablet      Calcium-Vitamin D (CALCIUM + D PO) Take 1 tablet by mouth daily.       dorzolamide (TRUSOPT) 2 % ophthalmic solution Place 1 drop into both eyes 2 times daily 5 mL 12     erythromycin (ROMYCIN) 5 MG/GM ophthalmic ointment Place 0.5 inches into both eyes daily Apply small (1/4 inch) strip to affected eye(s) 1 Tube 11     fluticasone (FLONASE) 50 MCG/ACT spray Spray 1-2 sprays into both nostrils daily 1 Bottle 11     INCRUSE ELLIPTA 62.5 MCG/INH Inhaler Inhale 1 puff by mouth once daily 3 each 1     omega-3 fatty acids (FISH OIL) 1200 MG capsule Take 1 capsule by mouth daily       polyethylene glycol (MIRALAX) 17 GM/Dose powder Take 17 g (1 capful) by mouth daily 578 g 3     SENNA-docusate sodium (SENNA S) 8.6-50 MG tablet Take 1 tablet by mouth At Bedtime 30 tablet 3     Allergies   Allergen Reactions     Bactrim [Sulfamethoxazole W/Trimethoprim] Fatigue     Codeine Other (See Comments)     Heaaches     Dairy Products [Milk Protein Extract] Cough     And phlegm     "    Lisinopril Cough     Recent Labs   Lab Test 08/19/21  0953 03/29/21  1423 10/15/20  0830 10/15/20  0830 10/18/19  1220 10/18/19  1220 10/25/18  1118 10/25/18  1118 09/26/16  1206 03/07/16  1555 05/02/14  0755 08/15/13  0859   LDL  --   --   --  60  --  64  --  57   < >  --    < > 123   HDL  --   --   --  73  --  71  --  61   < >  --    < > 59   TRIG  --   --   --  68  --  46  --  54   < >  --    < > 99   ALT  --   --   --  37  --   --   --   --   --  33  --  32   CR 0.81 1.01   < > 0.75   < > 0.64   < > 0.71   < > 0.83   < > 0.76   GFRESTIMATED 64 49*   < > 70   < > 79   < > 78   < > 65   < > 73   GFRESTBLACK  --  57*  --  82   < > >90   < > >90   < > 79   < > 88   POTASSIUM 3.9 4.0   < > 4.2   < > 3.9   < > 3.9   < > 3.7   < > 3.8   TSH  --   --   --   --   --   --   --   --   --   --   --  3.53    < > = values in this interval not displayed.          ROS:  Constitutional, HEENT, cardiovascular, pulmonary, gi and gu systems are negative, except as otherwise noted.    OBJECTIVE:   BP (!) 166/79 (BP Location: Left arm, Patient Position: Sitting, Cuff Size: Adult Small)   Pulse 77   Ht 1.53 m (5' 0.25\")   Wt 44.3 kg (97 lb 9.6 oz)   LMP 05/10/1978 (Approximate)   SpO2 100%   BMI 18.90 kg/m   Estimated body mass index is 18.9 kg/m  as calculated from the following:    Height as of this encounter: 1.53 m (5' 0.25\").    Weight as of this encounter: 44.3 kg (97 lb 9.6 oz).  EXAM:   GENERAL: elderly, alert, thin, well hydrated, no distress  HENT: ear canals- normal; TMs- normal; Nose- normal; Mouth- no ulcers, no lesions, missing dentition  NECK: no tenderness, no adenopathy, no asymmetry, no masses, no stiffness; thyroid- normal to palpation  RESP: lungs clear to auscultation - no rales, no rhonchi, no wheezes  CV: regular rates and rhythm, normal S1 S2, no S3 or S4 and no murmur, no click or rub, normal pulses  ABDOMEN: soft, no tenderness, no  hepatosplenomegaly, no masses, normal bowel sounds  MS: " extremities- no gross deformities noted, no edema  SKIN: no suspicious lesions, no rashes, age related skin changes with seborrheic keratosis and no actinic keratosis.    NEURO: strength and tone- decreased, sensory exam- grossly normal, reflexes- symmetric  BACK: no CVA tenderness, no paralumbar tenderness  MENTAL STATUS EXAM:  Appearance/Behavior: no apparent distress, neatly groomed, dressed appropriately for weather, appears stated age and is frail-appearing  Speech: normal  Mood/Affect: normal affect  Insight: Good     Diagnostic Test Results:  Labs reviewed in Epic  Results for orders placed or performed in visit on 10/21/21 (from the past 24 hour(s))   CBC with platelets   Result Value Ref Range    WBC Count 5.6 4.0 - 11.0 10e3/uL    RBC Count 4.25 3.80 - 5.20 10e6/uL    Hemoglobin 13.1 11.7 - 15.7 g/dL    Hematocrit 38.6 35.0 - 47.0 %    MCV 91 78 - 100 fL    MCH 30.8 26.5 - 33.0 pg    MCHC 33.9 31.5 - 36.5 g/dL    RDW 12.6 10.0 - 15.0 %    Platelet Count 229 150 - 450 10e3/uL   UA with Microscopic reflex to Culture    Specimen: Urine, Clean Catch   Result Value Ref Range    Color Urine Yellow Colorless, Straw, Light Yellow, Yellow    Appearance Urine Cloudy (A) Clear    Glucose Urine Negative Negative mg/dL    Bilirubin Urine Negative Negative    Ketones Urine Negative Negative mg/dL    Specific Gravity Urine 1.015 1.003 - 1.035    Blood Urine Trace (A) Negative    pH Urine 6.5 5.0 - 7.0    Protein Albumin Urine Negative Negative mg/dL    Urobilinogen Urine 0.2 0.2, 1.0 E.U./dL    Nitrite Urine Positive (A) Negative    Leukocyte Esterase Urine Large (A) Negative       ASSESSMENT / PLAN:       ICD-10-CM    1. Encounter for Medicare annual wellness exam  Z00.00    2. Encounter for immunization  - done Z23 INFLUENZA, QUAD, HIGH DOSE, PF, 65YR + (FLUZONE HD)   3. Hyperlipidemia LDL goal <70 recheck E78.5 Lipid panel reflex to direct LDL Fasting     ALT     Lipid panel reflex to direct LDL Fasting     ALT   4.  "Hypertension goal BP (blood pressure) < 140/90 - not well controlled on medications will adjust medications.  I10 CBC with platelets     Basic metabolic panel     Albumin Random Urine Quantitative with Creat Ratio     CBC with platelets     Basic metabolic panel     Albumin Random Urine Quantitative with Creat Ratio   5. ASVD (arteriosclerotic vascular disease)  I70.90 Taking aspirin, statin, control blood pressure better   6. E. coli UTI (urinary tract infection), recurrent - recheck urine. Had pyelonephritis last year and no issues since this time. Took a long time to recover.  N39.0 UA with Microscopic reflex to Culture    B96.20 UA with Microscopic reflex to Culture     Urine Microscopic Exam     Urine Culture   7. Mild protein-calorie malnutrition (H)  E44.1 Care Coordination Referral- needs some assistance with long term planning for housing and transportation. Does not want to move at this time, but is thinking about the future. Diet printed for patient with increased calorie and protein intake.   8. Carotid artery stenosis, asymptomatic, left  I65.22 atorvastatin (LIPITOR) 20 MG tablet   9. Essential hypertension with goal blood pressure less than 140/90  I10 amLODIPine (NORVASC) 5 MG tablet- increased to 5 mg.        Patient has been advised of split billing requirements and indicates understanding: No    COUNSELING:  Reviewed preventive health counseling, as reflected in patient instructions       Regular exercise       Healthy diet/nutrition       Vision screening       Hearing screening       Bladder control       Fall risk prevention    Estimated body mass index is 18.9 kg/m  as calculated from the following:    Height as of this encounter: 1.53 m (5' 0.25\").    Weight as of this encounter: 44.3 kg (97 lb 9.6 oz).    Weight management plan dietary needs and recommendations reviewed.    She reports that she quit smoking about 51 years ago. She has never used smokeless tobacco.    Appropriate preventive " "services were discussed with this patient, including applicable screening as appropriate for cardiovascular disease, diabetes, osteopenia/osteoporosis, and glaucoma.  As appropriate for age/gender, discussed screening for colorectal cancer, prostate cancer, breast cancer, and cervical cancer. Checklist reviewing preventive services available has been given to the patient.    Reviewed patients plan of care and provided an AVS. The Basic Care Plan (routine screening as documented in Health Maintenance) for Diann meets the Care Plan requirement. This Care Plan has been established and reviewed with the Patient.    Counseling Resources:  ATP IV Guidelines  Pooled Cohorts Equation Calculator  Breast Cancer Risk Calculator  BRCA-Related Cancer Risk Assessment: FHS-7 Tool  FRAX Risk Assessment  ICSI Preventive Guidelines  Dietary Guidelines for Americans, 2010  [employer]'s MyPlate  ASA Prophylaxis  Lung CA Screening    Esmer Finley MD  [facility]  Answers for HPI/ROS submitted by the patient on 10/21/2021  In general, how would you rate your overall physical health?: good  Frequency of exercise:: 2-3 days/week  Do you usually eat at least 4 servings of fruit and vegetables a day, include whole grains & fiber, and avoid regularly eating high fat or \"junk\" foods? : Yes  Taking medications regularly:: Yes  Medication side effects:: None  Activities of Daily Living: shopping requires assistance, preparing meals requires assistance, housework requires assistance  Home safety: lack of grab bars in the bathroom  Hearing Impairment:: difficulty following a conversation in a noisy restaurant or crowded room, no hearing concerns  In the past 6 months, have you been bothered by leaking of urine?: Yes  abdominal pain: No  Blood in stool: No  Blood in urine: No  chest pain: No  chills: No  congestion: No  constipation: Yes  cough: No  diarrhea: No  dizziness: No  ear pain: No  eye pain: " No  nervous/anxious: No  fever: No  frequency: No  genital sores: No  headaches: No  hearing loss: Yes  heartburn: No  arthralgias: Yes  joint swelling: No  peripheral edema: No  mood changes: No  myalgias: No  nausea: No  dysuria: No  palpitations: No  Skin sensation changes: No  sore throat: No  urgency: No  rash: No  shortness of breath: No  visual disturbance: No  weakness: Yes  pelvic pain: No  vaginal bleeding: No  vaginal discharge: No  tenderness: No  breast mass: No  breast discharge: No  In general, how would you rate your overall mental or emotional health?: fair  Additional concerns today:: No  Duration of exercise:: Less than 15 minutes

## 2021-10-21 NOTE — TELEPHONE ENCOUNTER
Pt calling about status. Pt was unclear about when to check back.    Pt has blockage in side of neck, and was requesting a CT (she thinks that is the correct test to request) on it from [facility].    X-Ray on lungs is for the nodules on lungs to check and see if they have gotten bigger.    If there is no expected call from scheduling these appointments (CT or x-ray), please call back pt with status of this request.    Able to leave a voicemail, best number [phone].     Kavita Pizarro,   [facility]

## 2021-10-21 NOTE — TELEPHONE ENCOUNTER
Patient is concerned that she forgot to speak with doctor about orders for x-rays on lungs and neck in [place].    She would appreciate a call at [phone].    keny

## 2021-10-21 NOTE — PATIENT INSTRUCTIONS
Patient Education   Personalized Prevention Plan  You are due for the preventive services outlined below.  Your care team is available to assist you in scheduling these services.  If you have already completed any of these items, please share that information with your care team to update in your medical record.  Health Maintenance Due   Topic Date Due     COPD Action Plan  Never done     Flu Vaccine (1) 09/01/2021     FALL RISK ASSESSMENT  10/15/2021     Annual Wellness Visit  10/15/2021        Patient Education   High-calorie, High-protein Meal Plan  2500 calories and 140 grams protein a day  You may not feel like eating when you are sick. But it is important to eat small amounts during the day, even if you are not hungry.     Aim for 5 to 6 small meals, or 3 meals and 3 snacks.    Eat by the clock: eat breakfast, lunch, dinner and snacks at the same time everyday.  If you are slowly losing weight, a diet that's high in calories and protein will help you maintain your weight.   Breakfast    1 scrambled egg (add milk and shredded cheese)    1 slice whole-wheat toast (add 1 tablespoon peanut butter)    8 ounces whole milk or high-protein milk  Morning snack      cup yogurt with fruit(add 2 tablespoons granola)  Lunch    1 cup cream soup      sandwich made with:  ? 1 slice bread  ? 2 to 3 ounces protein (such as egg, chicken or tuna salad, sliced turkey, ham, beef, cheese or peanut butter)  ? butter, margarine or mayonnaise.      cup fresh or canned fruit    1 cup whole milk or high-protein milk  Afternoon snack      cup cottage cheese or 2 pieces of string cheese  Dinner    2 to 3 ounces roasted chicken    Small baked potato (add butter and sour cream)      cooked carrots in cream sauce (or glazed with butter and brown sugar) or   cup broccoli with cheese sauce    1 cup whole milk or high-protein milk      cup pudding (add whipped topping)  Evening snack    Milk shake made with whole milk or high-protein milk, ice  cream and choice of flavorings    For informational purposes only. Not to replace the advice of your health care provider. Copyright   2008 [employer] [facility]. All rights reserved. Clinically reviewed by [employer]. [employer] [id number] - REV 02/19.

## 2021-10-22 NOTE — PROGRESS NOTES
Clinic Care Coordination Contact  [facility]/Voicemail    Referral Source: PCP  Clinical Data: CHW Outreach  Outreach attempted x 1 No voicemail to leave a message.    Plan: CHW will try to reach patient again in 1-2 business days.    Mesha VALLE CHW  Clinic Care Coordination  [facility]: [place], [place] & [place]  Phone: [phone]    Notes:  Referred by PCP  Needs transportation resources

## 2021-10-24 NOTE — RESULT ENCOUNTER NOTE
Dear Diann    Your test results are attached.    The urine shows a urinary tract infection and this needs to be treated as soon as possible. I am sending in a prescription for cephalexin 500 mg twice a day for 7 days. This should treat the urine infection well. It does not look like it is in your kidneys because the White Blood Cell count is normal. If you develop a fever or back pain please call clinic.     The blood sugar is normal and you do not have diabetes. The kidneys are healthy. We can recheck labs in 6 months.     Please contact me by CinnaBid if you have any questions about your labs or management. You may also call my office number [phone] for any questions.     Esmer Finley MD

## 2021-10-25 NOTE — PROGRESS NOTES
Clinic Care Coordination Contact  Community Health Worker Initial Outreach    CHW Initial Information Gathering:  Referral Source: PCP  CHW Additional Questions  If ED/Hospital discharge, follow-up appointment scheduled as recommended?: N/A  Medication changes made following ED/Hospital discharge?: N/A  MyChart active?: Yes  Patient sent Social Determinants of Health questionnaire?: No    Patient accepts CC: No, Patient declined CCC. She said she doesnt need any transportation r housing resources at this time. CHW advised CHW will send CCC information in case anything changes.. Patient will be sent Care Coordination introduction letter for future reference.   JON Briceño  Clinic Care Coordination  [facility]: [place], [place] & [place]  Phone: [phone]    Resources for Transportation

## 2021-10-25 NOTE — TELEPHONE ENCOUNTER
Pt would like to get the test ordered to be safe with the module on her lung. Relayed message about uti and medication.     Please place orders for test so patient can schedule.

## 2021-10-25 NOTE — LETTER
[facility] CARE COORDINATION  [address]  [place] [facility] [place] [address]    October 25, 2021    Diann JAYA John  [address] [address]      Dear Diann,    I am a clinic community health worker who works with Esmer Finley MD at [facility]. I wanted to thank you for spending the time to talk with me.  Below is a description of clinic care coordination and how I can further assist you.      The clinic care coordination team is made up of a registered nurse,  and community health worker who understand the health care system. The goal of clinic care coordination is to help you manage your health and improve access to the health care system in the most efficient manner. The team can assist you in meeting your health care goals by providing education, coordinating services, strengthening the communication among your providers and supporting you with any resource needs.    Please feel free to contact me at [phone] with any questions or concerns. We are focused on providing you with the highest-quality healthcare experience possible and that all starts with you.     Sincerely,     JON Briceño  Clinic Care Coordination  [facility]: [place], [place] & [place]  Phone: [phone]

## 2021-10-25 NOTE — TELEPHONE ENCOUNTER
Both the neck ultrasound and the chest CT scan last year were unchanged for more than 3 years. They do not need to be repeated this year unless there are new symptoms. I can order them again if requested but we can wait until next year also.     Please make sure Diann got the message about her urinary tract infection and is starting medication for this. I left a message on her answering machine yesterday and there is a MyChart message.     Esmer Finley MD

## 2021-10-26 NOTE — TELEPHONE ENCOUNTER
Order for CT lungs and carotid ultrasound placed.     Please call to schedule your ultrasound and CT scan at [facility] by calling [phone].     Esmer Finley MD

## 2021-10-29 NOTE — TELEPHONE ENCOUNTER
"Pt calling concerned that she is experiencing SE from her antibiotic.    Pt states she has not measured her temperature, but fever suspected by triager based on pt's report of \"chills constantly,\" and stating that she, \"just can't get warm.\" Pt reports a, \"headache I can't get rid of.\" She states she has no appetite, but is eating because she knows she needs to.     RN advised reevaluation is needed to r/o worsening or spreading infection based on her report of chills. RN advised in-person evaluation in urgent care now. Pt indicates understanding of issues and agrees with the plan. She states her daughter lives with her and she will have her take her to [facility].    GEOVANNA Sun, RN      Reason for Disposition    [1] Fever > 100.0 F (37.8 C) AND [2] new onset since starting antibiotics     Pt has not measured her temperature, but fever suspected by triager based on pt's report of \"chills constantly,\" and stating that she, \"just can't get warm.\"    Additional Information    Negative: Shock suspected (e.g., cold/pale/clammy skin, too weak to stand, low BP, rapid pulse)    Negative: Sounds like a life-threatening emergency to the triager    Negative: Urinary tract infection suspected, but not taking antibiotics    Negative: [1] Unable to urinate (or only a few drops) > 4 hours AND     [2] bladder feels very full (e.g., palpable bladder or strong urge to urinate)    Negative: Passing pure blood or large blood clots (i.e., size > a dime)  (Exceptions: flecks, small strands, or pinkish-red color)    Negative: Fever > 103 F (39.4 C)    Negative: Patient sounds very sick or weak to the triager    Protocols used: URINARY TRACT INFECTION ON ANTIBIOTIC FOLLOW-UP CALL - FEMALE-A-    GEOVANNA Sun, RN  "

## 2021-10-29 NOTE — PROGRESS NOTES
Chief Complaint   Patient presents with     UTI       Results for orders placed or performed in visit on 10/29/21   UA Macro with Reflex to Micro and Culture - lab collect     Status: Normal    Specimen: Urine, Clean Catch   Result Value Ref Range    Color Urine Yellow Colorless, Straw, Light Yellow, Yellow    Appearance Urine Clear Clear    Glucose Urine Negative Negative mg/dL    Bilirubin Urine Negative Negative    Ketones Urine Negative Negative mg/dL    Specific Gravity Urine <=1.005 1.003 - 1.035    Blood Urine Negative Negative    pH Urine 6.0 5.0 - 7.0    Protein Albumin Urine Negative Negative mg/dL    Urobilinogen Urine 0.2 0.2, 1.0 E.U./dL    Nitrite Urine Negative Negative    Leukocyte Esterase Urine Negative Negative    Narrative    Microscopic not indicated             ASSESSMENT:          PLAN: Headache and chills in 89-year-old female.  On Keflex, encouraged her to finish it.  UA is negative today.  Unclear etiology of headache.  Use Tylenol twice daily.  Basic labs done today.  If headache persists or worsens over the weekend instructed to go to the ER as I cannot rule out clot, bleeding, tumor.  Covid test obtained.    Advised about symptoms which might herald more serious problems.    Blood pressure just mildly elevated.  Normal blood pressure is usually in the 160s over 70s.      Krysten Lindsay PA-C        Subjective:       89-year-old female is here with her daughter to rule out persistent UTI.  Urine culture on 10/21 grew E. coli, sensitive to Keflex.  Is currently on Keflex for 6 days.  Despite that she states she has chills and feels feverish.  No flank pain or back pain.  No chest pain or shortness of breath.  No vision changes.  Tried Tylenol once with slight relief of frontal headache.  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  Her appetite has been low.  This headache feels different than her normal sinus headaches.  Has been persistent over the last few days.  Wears  dentures.  No history of TMJ.  No dizziness.        Allergies   Allergen Reactions     Bactrim [Sulfamethoxazole W/Trimethoprim] Fatigue     Codeine Other (See Comments)     Heaaches     Dairy Products [Milk Protein Extract] Cough     And phlegm        Lisinopril Cough       Past Medical History:   Diagnosis Date     Hypertension      Nonsenile cataract      Postherpetic neuralgia      Shingles     3 x      Steroid responders 2014       albuterol (PROAIR HFA/PROVENTIL HFA/VENTOLIN HFA) 108 (90 Base) MCG/ACT inhaler, Inhale 2 puffs into the lungs every 4 hours as needed for shortness of breath / dyspnea or wheezing  amLODIPine (NORVASC) 5 MG tablet, Take 0.5 tablets (2.5 mg) by mouth daily  ascorbic acid (VITAMIN C) 1000 MG TABS, Take 1,000 mg by mouth daily  atorvastatin (LIPITOR) 20 MG tablet, Take 1 tablet (20 mg) by mouth daily  Calcium-Vitamin D (CALCIUM + D PO), Take 1 tablet by mouth daily.  dorzolamide (TRUSOPT) 2 % ophthalmic solution, Place 1 drop into both eyes 2 times daily  erythromycin (ROMYCIN) 5 MG/GM ophthalmic ointment, Place 0.5 inches into both eyes daily Apply small (1/4 inch) strip to affected eye(s)  fluticasone (FLONASE) 50 MCG/ACT spray, Spray 1-2 sprays into both nostrils daily  INCRUSE ELLIPTA 62.5 MCG/INH Inhaler, Inhale 1 puff by mouth once daily  omega-3 fatty acids (FISH OIL) 1200 MG capsule, Take 1 capsule by mouth daily  polyethylene glycol (MIRALAX) 17 GM/Dose powder, Take 17 g (1 capful) by mouth daily  SENNA-docusate sodium (SENNA S) 8.6-50 MG tablet, Take 1 tablet by mouth At Bedtime  aspirin 81 MG tablet, Take 1 tablet (81 mg) by mouth daily  cephALEXin (KEFLEX) 500 MG capsule, Take 1 capsule (500 mg) by mouth 2 times daily    DOBUTamine 500 mg in dextrose 5% 250 mL (adult std)        Social History     Tobacco Use     Smoking status: Former Smoker     Quit date: 1970     Years since quittin.8     Smokeless tobacco: Never Used   Substance Use Topics     Alcohol use:  No     Drug use: No       ROS:  General: negative for fever  ABD: Denies abd pain  : as above    OBJECTIVE:  BP (!) 170/82   Pulse 72   Temp 99  F (37.2  C) (Tympanic)   Wt 46.8 kg (103 lb 3.2 oz)   LMP 05/10/1978 (Approximate)   SpO2 96%   Breastfeeding No   BMI 19.99 kg/m     General:   awake, alert, and cooperative.  NAD.   Head: Normocephalic, atraumatic.  Eyes: Conjunctiva clear, non icteric.  Pupils equal reactive to light and accommodation.  EOMs intact.  Ears: TMs translucent.  ABD: soft, no tenderness to palpation , no rigidity, guarding or rebound . No CVAT  Neuro: Alert and oriented - normal speech.   Negative pronator drift  Smile symmetric.  No slurred speech.  No tongue deviation.  Normal rapid hand clapping and heel-to-shin.  Neck-supple, none tender, full range of motion without pain.  No mastoid tenderness.  Heart regular rate and rhythm without murmur  Lungs are clear to auscultation

## 2021-11-10 NOTE — RESULT ENCOUNTER NOTE
Dear Diann    Your test results are attached.     The nodules are stable. The lower part of the lungs show some fibrosis, which is a kind of stiffness or scarring of the lungs. This may cause some shortness of breath when you are active. There is not any real treatment for this. Avoid smoke exposure and any cleaning chemicals.     Please contact me by [employer] if you have any questions about your labs or management. You may also call my office number [phone] for any questions.     Esmer Finley MD

## 2021-11-10 NOTE — RESULT ENCOUNTER NOTE
Dear Diann    Your test results are attached. I am happy to let you know that they are stable.    The carotid ultrasound shows that the carotid blood flow is stable and is a little better on the left side than when last checked. Recheck in 2-3 years or if new symptoms develop.     Please contact me by "[employer]hart if you have any questions about your labs or management. You may also call my office number [phone] for any questions.     Esmer Finley MD

## 2021-11-24 NOTE — PATIENT INSTRUCTIONS
"  Patient Education   After Your COVID-19 (Coronavirus) Test  You have been tested for COVID-19 (coronavirus).   If you'll have surgery in the next few days, we'll let you know ahead of time if you have the virus. Please call your surgeon's office with any questions.  For all other patients: Results are usually available in [facility] within 2 to 3 days.   If you do not have a [facility] account, you'll get a letter in the mail in about 7 to 10 days.   [employer] is often the fastest way to get test results. Please sign up if you do not already have a [facility] account. See the handout Getting COVID-19 Test Results in [facility] for help.  What if my test result is positive?  If your test is positive and you have not viewed your result in [facility], you'll get a phone call with your result. (A positive test means that you have the virus.)     Follow the tips under \"How do I self-isolate?\" below for 10 days (20 days if you have a weak immune system).    You don't need to be retested for COVID-19 before going back to school or work. As long as you're fever-free and feeling better, you can go back to school, work and other activities after waiting the 10 or 20 days.  What if I have questions after I get my results?  If you have questions about your results, please visit our testing website at [email or web address].   After 7 to 10 days, if you have not gotten your results:     Call [phone] ([phone]) and ask to speak with our COVID-19 results team.    If you're being treated at an infusion center: Call your infusion center directly.  What are the symptoms of COVID-19?  Cough, fever and trouble breathing are the most common signs of COVID-19.  Other symptoms can include new headaches, new muscle or body aches, new and unexplained fatigue (feeling very tired), chills, sore throat, congestion (stuffy or runny nose), diarrhea (loose poop), loss of taste or smell, belly pain, and nausea or vomiting " "(feeling sick to your stomach or throwing up).  You may already have symptoms of COVID-19, or they may show up later.  What should I do if I have symptoms?  If you're having surgery: Call your surgeon's office.  For all other patients: Stay home and away from others (self-isolate) until ...    You've had no fever--and no medicine that reduces fever--for 1 full day (24 hours), AND    Other symptoms have gotten better. For example, your cough or breathing has improved, AND    At least 10 days have passed since your symptoms first started.  How do I self-isolate?    Stay in your own room, even for meals. Use your own bathroom if you can.    Stay away from others in your home. No hugging, kissing or shaking hands. No visitors.    Don't go to work, school or anywhere else.    Clean \"high touch\" surfaces often (doorknobs, counters, handles). Use household cleaning spray or wipes. You'll find a full list of  on the EPA website: www.epa.gov/pesticide-registration/list-n-disinfectants-use-against-sars-cov-2.    Cover your mouth and nose with a mask or other face covering to avoid spreading germs.    Wash your hands and face often. Use soap and water.    Caregivers in these groups are at risk for severe illness due to COVID-19:  ? People 65 years and older  ? People who live in a nursing home or long-term care facility  ? People with chronic disease (lung, heart, cancer, diabetes, kidney, liver, immunologic)  ? People who have a weakened immune system, including those who:    Are in cancer treatment    Take medicine that weakens the immune system, such as corticosteroids    Had a bone marrow or organ transplant    Have an immune deficiency    Have poorly controlled HIV or AIDS    Are obese (body mass index of 40 or higher)    Smoke regularly    Caregivers should wear gloves while washing dishes, handling laundry and cleaning bedrooms and bathrooms.    Use caution when washing and drying laundry: Don't shake dirty " laundry and use the warmest water setting that you can.    For more tips on managing your health at home, go to www.cdc.gov/coronavirus/2019-ncov/downloads/10Things.pdf.  How can I take care of myself at home?  1. Get lots of rest. Drink extra fluids (unless a doctor has told you not to).  2. Take Tylenol (acetaminophen) for fever or pain. If you have liver or kidney problems, ask your family doctor if it's OK to take Tylenol.   Adults can take either:  ? 650 mg (two 325 mg pills) every 4 to 6 hours, or   ? 1,000 mg (two 500 mg pills) every 8 hours as needed.  ? Note: Don't take more than 3,000 mg in one day. Acetaminophen is found in many medicines (both prescribed and over-the-counter medicines). Read all labels to be sure you don't take too much.   For children, check the Tylenol bottle for the right dose. The dose is based on the child's age or weight.  3. If you have other health problems (like cancer, heart failure, an organ transplant or severe kidney disease): Call your specialty clinic if you don't feel better in the next 2 days.  4. Know when to call 911. Emergency warning signs include:  ? Trouble breathing or shortness of breath  ? Chest pain or pressure that doesn't go away  ? Feeling confused like you haven't felt before, or not being able to wake up  ? Bluish-colored lips or face  5. If your doctor prescribed a blood thinner medicine: Follow their instructions.  Where can I get more information?    [facility] - About COVID-19:   [email or web address]    CDC - If You're Sick: cdc.gov/coronavirus/2019-ncov/about/steps-when-sick.html    CDC - Ending Home Isolation: www.cdc.gov/coronavirus/2019-ncov/hcp/disposition-in-home-patients.html    CDC - Caring for Someone: www.cdc.gov/coronavirus/2019-ncov/if-you-are-sick/care-for-someone.html    [facility] - Interim Guidance for Hospital Discharge to Home: www.health.[facility].[place]/diseases/coronavirus/hcp/hospdischarge.pdf    [facility]  clinical trials (COVID-19 research studies): [email or web address]    Below are the COVID-19 hotlines at the [place] Department of [facility] ([facility]). Interpreters are available.  ? For health questions: Call 137-815-5321 or 1-742.395.7284 (7 a.m. to 7 p.m.)  ? For questions about schools and childcare: Call 821-455-8283 or 1-861.675.3784 (7 a.m. to 7 p.m.)    For informational purposes only. Not to replace the advice of your health care provider. Clinically reviewed by Infection Prevention and the [facility] COVID-19 Clinical Team. Copyright   2020 [facility]. All rights reserved. [employer] 434123 - Rev 11/11/20.

## 2021-11-24 NOTE — PROGRESS NOTES
Diann is a 89 year old who is being evaluated via a billable telephone visit.      What phone number would you like to be contacted at? [phone]  How would you like to obtain your AVS? MyChart    Assessment & Plan     Encounter for screening laboratory testing for COVID-19 virus in asymptomatic patient  Daughter got exposed to Covid positive case  She is symptomatic  Patient and daughter live in the same house  Patient is asymptomatic  This Covid positive case apparently was also hospitalized  Not clearly the exposure happened after the quarantine time was done  Daughter is going to get tested today  Patient wants to get tested  She is fully vaccinated  - Asymptomatic COVID-19 Virus (Coronavirus) by PCR Nose; Future      10 minutes spent on the date of the encounter doing chart review, history and exam, documentation and further activities per the note           Return if symptoms worsen or fail to improve.    Fernando Marks MD  [facility]   Diann is a 89 year old who presents for the following health issues     HPI   She wants to get a Covid test  Completely asymptomatic  Her daughter got exposed to Covid positive case and her daughter is now sick  She is fully vaccinated  She lives with her daughter  However I am not sure if the exposure to the Covid positive case happened after the quarantine time of the particular case since apparently the case in question was admitted in the hospital          Review of Systems   Constitutional, HEENT, cardiovascular, pulmonary, gi and gu systems are negative, except as otherwise noted.      Objective           Vitals:  No vitals were obtained today due to virtual visit.    Physical Exam   healthy, alert and no distress  PSYCH: Alert and oriented times 3; coherent speech, normal   rate and volume, able to articulate logical thoughts, able   to abstract reason, no tangential thoughts, no hallucinations   or delusions  Her affect is  normal  RESP: No cough, no audible wheezing, able to talk in full sentences  Remainder of exam unable to be completed due to telephone visits                Phone call duration: 6 minutes

## 2021-11-27 NOTE — TELEPHONE ENCOUNTER
TELEPHONE CALL -    Reason for call Daughter VALE is calling for test results COVID19   She does not have a CTC  On file   She thought she had one - since her mom is not able to hear well, and they call daughter into the room to talked to her about mom's PHI.    MOM gave verbal OK to this Nurse  RN gave results to daughter.  She would like her mom to sign a CTC so she will help mom with her cares. -Can the paper work be sent to her address on file she can have mom signed it?   She confirmed her PCP  Esmer Finley MD - Family Medicine  Informed patient that RN will send a note to PCP/Provider s Care Team [facility]    Note sent.    No symptoms reported, no reason to TRIAGE patient for this nurse.  Patient s questions answered  Reminded we will be here 24/7 for any other questions or concerns.  Writer encouraged to continue to monitor for symptoms and call us back as necessary.      Dinah Calero RN Nurse Triage Advisor 9:06 PM 11/26/2021

## 2022-01-01 ENCOUNTER — NURSE TRIAGE (OUTPATIENT)
Dept: NURSING | Facility: CLINIC | Age: 87
End: 2022-01-01
Payer: COMMERCIAL

## 2022-01-01 ENCOUNTER — TELEPHONE (OUTPATIENT)
Dept: PULMONOLOGY | Facility: CLINIC | Age: 87
End: 2022-01-01
Payer: COMMERCIAL

## 2022-01-01 ENCOUNTER — OFFICE VISIT (OUTPATIENT)
Dept: URGENT CARE | Facility: URGENT CARE | Age: 87
End: 2022-01-01
Payer: COMMERCIAL

## 2022-01-01 ENCOUNTER — TELEPHONE (OUTPATIENT)
Dept: FAMILY MEDICINE | Facility: CLINIC | Age: 87
End: 2022-01-01
Payer: COMMERCIAL

## 2022-01-01 VITALS
BODY MASS INDEX: 20.45 KG/M2 | SYSTOLIC BLOOD PRESSURE: 173 MMHG | HEART RATE: 92 BPM | TEMPERATURE: 97.6 F | OXYGEN SATURATION: 97 % | DIASTOLIC BLOOD PRESSURE: 82 MMHG | WEIGHT: 105.6 LBS

## 2022-01-01 DIAGNOSIS — I10 ESSENTIAL HYPERTENSION: ICD-10-CM

## 2022-01-01 DIAGNOSIS — R39.9 UTI SYMPTOMS: Primary | ICD-10-CM

## 2022-01-01 LAB
ALBUMIN UR-MCNC: NEGATIVE MG/DL
APPEARANCE UR: CLEAR
BILIRUB UR QL STRIP: NEGATIVE
COLOR UR AUTO: YELLOW
GLUCOSE UR STRIP-MCNC: NEGATIVE MG/DL
HGB UR QL STRIP: NEGATIVE
KETONES UR STRIP-MCNC: NEGATIVE MG/DL
LEUKOCYTE ESTERASE UR QL STRIP: NEGATIVE
NITRATE UR QL: NEGATIVE
PH UR STRIP: 5 [PH] (ref 5–7)
SP GR UR STRIP: <=1.005 (ref 1–1.03)
UROBILINOGEN UR STRIP-ACNC: 0.2 E.U./DL

## 2022-01-01 PROCEDURE — 81003 URINALYSIS AUTO W/O SCOPE: CPT | Performed by: PHYSICIAN ASSISTANT

## 2022-01-01 PROCEDURE — 99213 OFFICE O/P EST LOW 20 MIN: CPT | Performed by: PHYSICIAN ASSISTANT

## 2022-01-01 ASSESSMENT — ENCOUNTER SYMPTOMS
MUSCULOSKELETAL NEGATIVE: 1
MYALGIAS: 0
FATIGUE: 0
RHINORRHEA: 0
CONSTITUTIONAL NEGATIVE: 1
GASTROINTESTINAL NEGATIVE: 1
DIZZINESS: 0
HEMATURIA: 0
SHORTNESS OF BREATH: 0
LIGHT-HEADEDNESS: 0
FLANK PAIN: 0
CARDIOVASCULAR NEGATIVE: 1
NAUSEA: 0
NECK STIFFNESS: 0
POLYDIPSIA: 0
WEAKNESS: 0
ACTIVITY CHANGE: 0
ADENOPATHY: 0
VOMITING: 0
ENDOCRINE NEGATIVE: 1
FREQUENCY: 1
HEADACHES: 0
COUGH: 0
FEVER: 0
DYSURIA: 0
SORE THROAT: 0
RESPIRATORY NEGATIVE: 1
ABDOMINAL PAIN: 0
NECK PAIN: 0
PALPITATIONS: 0
DIARRHEA: 0
CHILLS: 0
NEUROLOGICAL NEGATIVE: 1

## 2022-02-02 NOTE — PROGRESS NOTES
Chief Complaint:    Chief Complaint   Patient presents with     Frequency     UTI       ASSESSMENT     1. UTI symptoms    2. Essential hypertension         PLAN    Patient left before being seen.  Urine was unremarkable for UTI.  She was called with urine results and advised that her BP was high and she should follow up with her primary provider if this continues to run high.      Labs:     Results for orders placed or performed in visit on 02/02/22   UA Macro with Reflex to Micro and Culture - lab collect     Status: Normal    Specimen: Urine, Midstream   Result Value Ref Range    Color Urine Yellow Colorless, Straw, Light Yellow, Yellow    Appearance Urine Clear Clear    Glucose Urine Negative Negative mg/dL    Bilirubin Urine Negative Negative    Ketones Urine Negative Negative mg/dL    Specific Gravity Urine <=1.005 1.003 - 1.035    Blood Urine Negative Negative    pH Urine 5.0 5.0 - 7.0    Protein Albumin Urine Negative Negative mg/dL    Urobilinogen Urine 0.2 0.2, 1.0 E.U./dL    Nitrite Urine Negative Negative    Leukocyte Esterase Urine Negative Negative    Narrative    Microscopic not indicated       Problem history    Patient Active Problem List   Diagnosis     Macular pucker, right eye     Pseudophakia OU     Blepharitis     Hypertension goal BP (blood pressure) < 140/90     Seasonal allergies     Carotid artery stenosis, asymptomatic     PVD (posterior vitreous detachment) OU     Advanced directives, counseling/discussion     ASVD (arteriosclerotic vascular disease)     Hyperlipidemia LDL goal <70     Chronic rhinitis     Pulmonary nodules     Pulmonary emphysema, unspecified emphysema type (H)     Family history of colon cancer     Protein-calorie malnutrition (H)     E. coli UTI (urinary tract infection), recurrent       Current Meds    Current Outpatient Medications:      albuterol (PROAIR HFA/PROVENTIL HFA/VENTOLIN HFA) 108 (90 Base) MCG/ACT inhaler, Inhale 2 puffs into the lungs every 4 hours as  needed for shortness of breath / dyspnea or wheezing, Disp: 1 Inhaler, Rfl: 3     amLODIPine (NORVASC) 5 MG tablet, Take 0.5 tablets (2.5 mg) by mouth daily, Disp: 90 tablet, Rfl: 3     ascorbic acid (VITAMIN C) 1000 MG TABS, Take 1,000 mg by mouth daily, Disp: , Rfl:      aspirin 81 MG tablet, Take 1 tablet (81 mg) by mouth daily, Disp: 30 tablet, Rfl:      atorvastatin (LIPITOR) 20 MG tablet, Take 1 tablet (20 mg) by mouth daily, Disp: 90 tablet, Rfl: 3     Calcium-Vitamin D (CALCIUM + D PO), Take 1 tablet by mouth daily., Disp: , Rfl:      cephALEXin (KEFLEX) 500 MG capsule, Take 1 capsule (500 mg) by mouth 2 times daily, Disp: 14 capsule, Rfl: 0     dorzolamide (TRUSOPT) 2 % ophthalmic solution, Place 1 drop into both eyes 2 times daily, Disp: 5 mL, Rfl: 12     erythromycin (ROMYCIN) 5 MG/GM ophthalmic ointment, Place 0.5 inches into both eyes At Bedtime Apply small (1/4 inch) strip to affected eye(s), Disp: 3.5 g, Rfl: 11     fluticasone (FLONASE) 50 MCG/ACT spray, Spray 1-2 sprays into both nostrils daily, Disp: 1 Bottle, Rfl: 11     INCRUSE ELLIPTA 62.5 MCG/INH Inhaler, Inhale 1 puff by mouth once daily, Disp: 3 each, Rfl: 0     polyethylene glycol (MIRALAX) 17 GM/Dose powder, Take 17 g (1 capful) by mouth daily, Disp: 578 g, Rfl: 3     SENNA-docusate sodium (SENNA S) 8.6-50 MG tablet, Take 1 tablet by mouth At Bedtime, Disp: 30 tablet, Rfl: 3     omega-3 fatty acids (FISH OIL) 1200 MG capsule, Take 1 capsule by mouth daily (Patient not taking: Reported on 2/2/2022), Disp: , Rfl:   No current facility-administered medications for this visit.    Facility-Administered Medications Ordered in Other Visits:      DOBUTamine 500 mg in dextrose 5% 250 mL (adult std), 15 mcg/kg/min, Intravenous, Continuous, Romo, Tameka Bri, PA-C    Allergies  Allergies   Allergen Reactions     Bactrim [Sulfamethoxazole W/Trimethoprim] Fatigue     Codeine Other (See Comments)     Heaaches     Dairy Products [Milk Protein Extract]  Cough     And phlegm        Lisinopril Cough       SUBJECTIVE    HPI:  Diann John is a 89 year old female who has symptoms of urinary urgency and frequency for 2 day(s).  she denies back pain, nausea, vomiting, fever and chills, flank pain, vaginal discharge, and vaginal odor.    ROS:      Review of Systems   Constitutional: Negative.  Negative for activity change, chills, fatigue and fever.   HENT: Negative for congestion, ear pain, rhinorrhea and sore throat.    Respiratory: Negative.  Negative for cough and shortness of breath.    Cardiovascular: Negative.  Negative for chest pain and palpitations.   Gastrointestinal: Negative.  Negative for abdominal pain, diarrhea, nausea and vomiting.   Endocrine: Negative.  Negative for polydipsia and polyuria.   Genitourinary: Positive for frequency and urgency. Negative for dysuria, flank pain, hematuria, pelvic pain, vaginal discharge and vaginal pain.   Musculoskeletal: Negative.  Negative for myalgias, neck pain and neck stiffness.   Allergic/Immunologic: Negative for immunocompromised state.   Neurological: Negative.  Negative for dizziness, weakness, light-headedness and headaches.   Hematological: Negative for adenopathy.       Family History   Family History   Problem Relation Age of Onset     Cancer Father      Glaucoma Father      Cancer Mother      Hypertension Mother      Hypertension Sister      Cerebrovascular Disease Sister      Thyroid Disease No family hx of      Macular Degeneration No family hx of         Social History  Social History     Socioeconomic History     Marital status:      Spouse name: Not on file     Number of children: Not on file     Years of education: Not on file     Highest education level: Not on file   Occupational History     Not on file   Tobacco Use     Smoking status: Former Smoker     Quit date: 1970     Years since quittin.1     Smokeless tobacco: Never Used   Substance and Sexual Activity     Alcohol use: No      Drug use: No     Sexual activity: Never   Other Topics Concern     Parent/sibling w/ CABG, MI or angioplasty before 65F 55M? No   Social History Narrative     Not on file     Social Determinants of Health     Financial Resource Strain: Not on file   Food Insecurity: Not on file   Transportation Needs: Not on file   Physical Activity: Not on file   Stress: Not on file   Social Connections: Not on file   Intimate Partner Violence: Not on file   Housing Stability: Not on file           OBJECTIVE     Vital signs noted and reviewed by Jairo Chen PA-C  BP (!) 173/82 (BP Location: Left arm, Patient Position: Sitting, Cuff Size: Adult Regular)   Pulse 92   Temp 97.6  F (36.4  C) (Tympanic)   Wt 47.9 kg (105 lb 9.6 oz)   LMP 05/10/1978 (Approximate)   SpO2 97%   BMI 20.45 kg/m               Jairo Chen PA-C  2/2/2022, 2:46 PM

## 2022-02-15 NOTE — CONFIDENTIAL NOTE
S-(situation): Call from Angelique [facility] in [place], [phone], who says their agency received a referral from [facility] to start home care services.    Patient has an appointment on 2/16/22.    Angelique called to confirm that Dr. Marks (or another provider) would be willing to sign and follow for continued home care orders.    B-(background):       A-(assessment): message to be routed to clinic      R-(recommendations): Message routed      Caller verbalized understanding.          Radu Levine RN/[facility]

## 2022-02-15 NOTE — TELEPHONE ENCOUNTER
Angelique Clement calling regarding Care Coordination that was ordered from the Hospital and wanst to know if PCP is willing to sign off on on going orders. I asked if she would need to talk to a nurse or should I take a message. She said I could take a message.  I explained that Dr Finley has retired and that patient was just was seen by urgent care, not sure if patient has established care. Angelique said they will have to drop the patient. I said that we would need some time to send the message out to see who would be able to sign future orders. Angelique said that she wants to be transferred to a Triage nurse. Transferred Angelique to the triage nurses. Then I look for future appts for the patient and patient has an appt with Jennie Maynard on 3/1/2022. Routing message to Jennie.  Carmela Jeffries [facility]  2nd Floor  Primary Care

## 2022-02-15 NOTE — TELEPHONE ENCOUNTER
I only spoke to her on phone for covid test . Looks liike she has an upcoming visit with breanna please ask her

## 2022-02-15 NOTE — TELEPHONE ENCOUNTER
To provider to review and advise if able to continue following with home care.     See Nurse Triage 2/15/22.    Roselyn Messer RN, BSN  [facility]

## 2022-02-17 NOTE — TELEPHONE ENCOUNTER
Called and spoke to Angelique with [facility].    They have tried contacting pt but are not able to get a hold of her, so she is not a current patient with them.    Advise them to call back if they are able to reach pt and need further orders from the provider.        Dina Briones RN  [facility]

## 2022-02-17 NOTE — TELEPHONE ENCOUNTER
[facility] Call Center    Phone Message    May a detailed message be left on voicemail: yes     Reason for Call: Pt's daughter Chantell is requesting a call back to discuss her Mom's symptoms.  She said that she's currently in the hospital with pneumonia and her breathing is shallow.  Thanks.    Action Taken: Message routed to:  Adult Clinics: Pulmonology p [id number]    Travel Screening: Not Applicable

## 2022-02-17 NOTE — TELEPHONE ENCOUNTER
Jennie is out this week. As patient has upcoming appt with her,I think we can approve the home care orders  \  Chidi Oakley PA-C

## 2022-02-17 NOTE — TELEPHONE ENCOUNTER
Contacted patient's daughter Chantell regarding patient's recent hospitalization. Per Chantell, patient was hospitalized at [facility] 02/12/2022 for Dx: pneumonia. Chantell states that she feels as if patient has been released too soon as patient is unable to walk from the chair to the restroom. Chantell also states that patient's temperature is 99.0.    Encouraged Chantell to contact patient's PCP to request a post hospital follow up. Per chart review, Dr. Gray has not seen patient since 08/12/2019. Patient no longer requires pulmonary nodule surveillance, as her nodules have been stable since 2017. Chantell expressed understanding.    Murphy Encarnacion LPN  Pulmonary Medicine:  [facility]  Phone: [phone] Fax: [phone]

## (undated) DEVICE — PAD CHUX UNDERPAD 23X24" 7136

## (undated) DEVICE — KIT ENDO FIRST STEP DISINFECTANT 200ML W/POUCH EP-4

## (undated) DEVICE — PREP CHLORAPREP 26ML TINTED ORANGE  260815

## (undated) RX ORDER — ALBUTEROL SULFATE 0.83 MG/ML
SOLUTION RESPIRATORY (INHALATION)
Status: DISPENSED
Start: 2018-08-03